# Patient Record
Sex: MALE | Race: WHITE | NOT HISPANIC OR LATINO | Employment: OTHER | URBAN - METROPOLITAN AREA
[De-identification: names, ages, dates, MRNs, and addresses within clinical notes are randomized per-mention and may not be internally consistent; named-entity substitution may affect disease eponyms.]

---

## 2017-09-13 ENCOUNTER — HOSPITAL ENCOUNTER (OUTPATIENT)
Dept: SLEEP MEDICINE | Facility: HOSPITAL | Age: 67
Discharge: HOME | End: 2017-09-13
Attending: INTERNAL MEDICINE | Admitting: INTERNAL MEDICINE

## 2018-05-31 ENCOUNTER — CONVERSION ENCOUNTER (OUTPATIENT)
Dept: ORTHOPEDIC SURGERY | Facility: CLINIC | Age: 68
End: 2018-05-31

## 2018-09-19 ENCOUNTER — HOSPITAL ENCOUNTER (OUTPATIENT)
Dept: SLEEP MEDICINE | Facility: HOSPITAL | Age: 68
Discharge: HOME | End: 2018-09-19
Attending: INTERNAL MEDICINE | Admitting: INTERNAL MEDICINE

## 2019-06-04 VITALS
HEIGHT: 72 IN | HEART RATE: 62 BPM | SYSTOLIC BLOOD PRESSURE: 160 MMHG | WEIGHT: 243 LBS | BODY MASS INDEX: 32.91 KG/M2 | DIASTOLIC BLOOD PRESSURE: 71 MMHG

## 2019-06-19 ENCOUNTER — APPOINTMENT (OUTPATIENT)
Dept: SLEEP MEDICINE | Facility: HOSPITAL | Age: 69
End: 2019-06-19

## 2019-09-19 ENCOUNTER — OFFICE VISIT (OUTPATIENT)
Dept: SLEEP MEDICINE | Facility: HOSPITAL | Age: 69
End: 2019-09-19

## 2019-09-19 VITALS
WEIGHT: 220.8 LBS | DIASTOLIC BLOOD PRESSURE: 78 MMHG | SYSTOLIC BLOOD PRESSURE: 165 MMHG | OXYGEN SATURATION: 97 % | HEART RATE: 61 BPM | BODY MASS INDEX: 29.91 KG/M2 | HEIGHT: 72 IN

## 2019-09-19 DIAGNOSIS — G47.33 OBSTRUCTIVE SLEEP APNEA, ADULT: Primary | ICD-10-CM

## 2019-09-19 PROCEDURE — G0463 HOSPITAL OUTPT CLINIC VISIT: HCPCS

## 2019-09-19 RX ORDER — QUINIDINE GLUCONATE 324 MG
1 TABLET, EXTENDED RELEASE ORAL
COMMUNITY
End: 2019-09-19

## 2019-09-19 RX ORDER — MELOXICAM 15 MG/1
TABLET ORAL EVERY 24 HOURS
COMMUNITY
Start: 2018-05-31

## 2019-09-19 RX ORDER — AMLODIPINE BESYLATE AND BENAZEPRIL HYDROCHLORIDE 5; 20 MG/1; MG/1
1 CAPSULE ORAL DAILY
COMMUNITY
Start: 2013-12-17 | End: 2022-04-21

## 2019-09-19 RX ORDER — MULTIVITAMIN
1 CAPSULE ORAL DAILY
COMMUNITY
Start: 2018-05-31

## 2019-09-19 RX ORDER — NEBIVOLOL 5 MG/1
5 TABLET ORAL DAILY
COMMUNITY
Start: 2013-02-22 | End: 2021-08-02

## 2019-09-19 RX ORDER — ATORVASTATIN CALCIUM 40 MG/1
40 TABLET, FILM COATED ORAL DAILY
COMMUNITY
Start: 2013-02-22 | End: 2022-04-21 | Stop reason: SDUPTHER

## 2019-09-19 RX ORDER — SOY PROTEIN
1 POWDER (GRAM) ORAL DAILY
COMMUNITY
Start: 2018-05-31

## 2019-09-19 RX ORDER — PANTOPRAZOLE SODIUM 40 MG/1
TABLET, DELAYED RELEASE ORAL EVERY 24 HOURS
COMMUNITY
Start: 2018-05-31

## 2019-09-19 RX ORDER — SACCHAROMYCES BOULARDII 250 MG
250 CAPSULE ORAL DAILY
COMMUNITY
Start: 2018-05-31

## 2019-09-19 RX ORDER — ASCORBIC ACID 500 MG
1000 TABLET ORAL EVERY 12 HOURS
COMMUNITY
Start: 2018-05-31

## 2019-09-19 NOTE — PROGRESS NOTES
SLEEP MEDICINE CONSULT      Patient Identification:     Name: Tom Steele   Age: 68 y.o.   Gender: male   : 1950   MRN: [id number]     Date of consult: 2019    PCP/Referring Physician(s):     PCP: Sunil Daugherty MD  Referring Provider: Sunil Daugherty MD      Chief Complaint:   Obstructive sleep apnea.  Yearly follow-up for compliance.    History of Present Illness:   This is a very pleasant 68 years old  gentleman who is here today for yearly follow-up for compliance after using his mask with given pressures for any year or so.  He is accompanied by his wife.    He has a full facemask which is sanjay view.  He finds the mask comfortable though he wants to look at other varieties a full facemask.  No air leaks noted by him.  The pressures are quite tolerable to him.  Uses humidifier on regular basis.    His usual bedtime nowadays is 11 PM takes him symmetrically an hour or so to doze off.  If he takes some time to relax at night.  He thinks about his day.  Usually try to plan his next day.  He wakes up at 8 in the morning to start his day.  He has often woken up between 4 AM to 4:30 AM for a bathroom break.  He denies any symptoms of headache or dry mouth at night on the morning he does not snore with the mask in place.  No symptoms of heartburn at night or in the morning.  He describes himself as a restless sleeper toss and turn at night.  His neck and back hurts.  He has leg cramps at night.    He has woken up in the morning a little somnolent.  He usually wakes up after 2 cups of coffee.  He likes to d to take 2 cans of Diet Coke throughout the day.  He takes a nap after breakfast and in the afternoon.  Does not use his mask.  Usually if feels refreshed after the nap.  [place] were sleepiness score is 5/24.    Allergies, Medications, and Past History:   Allergies:    No Known Allergies  Current Medications:    Prior to Admission medications    Medication Sig Start Date End Date  Taking? Authorizing Provider   amLODIPine-benazepril (LOTREL 5-20) 5-20 MG per capsule AMLODIPINE BESY-BENAZEPRIL HCL 5-20 MG CAPS 12/17/13  Yes Provider, Historical, MD   atorvastatin (LIPITOR) 40 MG tablet Take 40 mg by mouth Daily. 2/22/13  Yes Provider, Historical, MD   Cetirizine HCl (ZYRTEC ALLERGY) 10 MG tablet dispersible Daily. 5/31/18  Yes Provider, MD Kita   Cholecalciferol (EQL VITAMIN D3) 2000 units capsule EQL VITAMIN D3 2000 UNIT CAPS 5/31/18  Yes Provider, Historical, MD   Chromium-Cinnamon (CINNAMON PLUS CHROMIUM)  MCG-MG capsule CINNAMON PLUS CHROMIUM  MCG-MG CAPS 5/31/18  Yes Provider, Historical, MD   Cobalamine Combinations (VITAMIN B12-FOLIC ACID) 500-400 MCG tablet VITAMIN B12-FOLIC ACID 500-400 MCG TABS 5/31/18  Yes Provider, MD Kita   Coenzyme Q10 (COQ-10) 200 MG capsule COQ-10 200 MG ORAL CAPSULE 5/31/18  Yes Provider, Historical, MD   Glucosamine HCl 1000 MG tablet GLUCOSAMINE HCL TABS 5/31/18  Yes Provider, Historical, MD   meloxicam (MOBIC) 15 MG tablet Daily. 5/31/18  Yes Provider, Historical, MD   Multiple Vitamin (MULTIVITAMIN) capsule MULTIVITAMINS CAPS 5/31/18  Yes Provider, Historical, MD   nebivolol (BYSTOLIC) 5 MG tablet Take 5 mg by mouth Daily. 2/22/13  Yes Provider, Historical, MD   pantoprazole (PROTONIX) 40 MG EC tablet Daily. 5/31/18  Yes Provider, Historical, MD   saccharomyces boulardii (FLORASTOR) 250 MG capsule PROBIOTIC CAPS 5/31/18  Yes Provider, Historical, MD   vitamin C (ASCORBIC ACID) 500 MG tablet Every 12 (Twelve) Hours. 5/31/18  Yes Provider, Historical, MD   aspirin 81 MG tablet Take 81 mg by mouth Daily.    Provider, Historical, MD   CINNAMON PO Take  by mouth.    Provider, Historical, MD   Glucosamine-Chondroit-Vit C-Mn (GLUCOSAMINE-CHONDROITIN) tablet Take 1 tablet by mouth.  9/19/19  Provider, MD Kita     Past Medical History:    Past Medical History:   Diagnosis Date   • Allergic rhinitis    • Degenerative joint disease   "  • FH: cholecystectomy    • Hypercholesteremia    • Hypertension    • NEREIDA (obstructive sleep apnea)       Past Surgical History:    History reviewed. No pertinent surgical history.   Social History:    Social History     Tobacco Use   • Smoking status: Never Smoker   • Smokeless tobacco: Never Used   Substance Use Topics   • Alcohol use: Defer   • Drug use: Not on file     Family Medical History:  History reviewed. No pertinent family history.      Review of Systems:   Constitutional:  Denies fever or chills and weight gain  Eyes:  Denies change in visual acuity   HENT: He has nasal congestion, sore throat or post nasal drainage which she attributes to allergies.  He becomes more symptomatic during spring and fall.  He was tested for allergies in the past took allergy shots for 2 years.  He found it very ineffective and gave it up.  He uses [place] pot on regular basis.  Respiratory:  Denies cough, shortness of breath or dyspnea on exertion    Cardiovascular:  Denies chest pain or edema   GI:  Denies abdominal pain, nausea, vomiting, bloody stools or diarrhea .  No aerophagia.  :  Denies dysuria or nocturia   Musculoskeletal: He suffers from back pain as well as neck pain.  He has severe degenerative joint disease involving cervical as well as lumbosacral spine.  He underwent several surgical procedures.  He has remained symptomatic.    Integument:  Denies rash   Neurologic:  Denies headache, focal weakness or sensory changes. No history of stroke or seizures.   Endocrine:  Denies polyuria or polydipsia   Lymphatic:  Denies swollen glands   Psychiatric:  Denies depression, anxiety or claustrophobia      Physical Exam:     Vitals:    09/19/19 1135   BP: 165/78   Pulse: 61   SpO2: 97%   Weight: 100 kg (220 lb 12.8 oz)   Height: 182.9 cm (72\")     HEENT: Head is normocephalic, atraumatic, normal distribution of hair. Pupils reactive to light. Ocular movements intact. No nasal congestion on sniff test. No deviated " nasal septum. No micrognathia.  Throat: Mallapatti stage 4, tongue midline, mucosa moist.  Neck: no JVD, thyromegaly, mass, +midline trachea, No lymphadenopathy.  Lungs: clear, no wheeze, rhonchi, crackles  Cardiovascular: S1, S2, RRR no murmurs, rubs or gallops  Abd: +BS's soft NT/ND, no CVA tenderness.    Ext: no edema, cyanosis, clubbing, pulses intact  Neuro: Awake alert, oriented to time place and person. Grossly intact to motor, sensory cerebral, and cerebellar (without focal deficit)  Psych: No overt lor, depression, psychosis or inappropriate behavior  Skin: No rash, cellulitis, or ulcerations.  No facial rash or erosions    DIAGNOSTIC DATA  Memory card download shows data from 9/19/2018 to 9/18/2019 overall 365 days of data reviewed.  99.7% use noted.  Maximum hours used 11 hours 44 minutes.  Minimum time use 5 hours 33 minutes.  99.7% of the time the mask has been used for more than 4 hours.  The average apnea hypopnea index is 3.0 events per hour on an auto CPAP mode of therapy with a pressure range between 14-20 CWP.  Assessment/Plan:   Obstructive sleep apnea:  This is a very pleasant gentleman who has been diagnosed with obstructive sleep apnea.  He has remained compliant with therapy.  He is getting full benefit from it.    The results of the memory card download were discussed in detail with the patient all questions were answered.  Recommendation.  To continue using his mask with given pressures on a nightly basis.  He is advised to continue using the mask with given pressures for more than 4 hours for maximum benefit.  Or as long as he sleeps.  He is advised to use the mask with given pressure if he takes a nap during the daytime.  Insomnia:  He has difficult time falling asleep at night.  Sometimes takes him an hour or so to doze off.  His insomnia may be due to his chronic pain syndrome.  His neck and back hurts at bedtime.  He finds it difficult to relax at night.  Underlying  anxiety/depression may  Be aggravating his nocturnal insomnia.  Commendation.  As of therapy for underlying factors is recommended in order to control the symptoms of insomnia.  Driving instructions. Patient is advised to avoid driving if tired or somnolent. To avoid all alcoholic beverages or medications causing somnolence.         Diagnosis Plan   1. Obstructive sleep apnea, adult  CPAP Therapy     No orders of the defined types were placed in this encounter.    Orders Placed This Encounter   Procedures   • CPAP Therapy     Please change FFM to dreamware full face mask . Size to fit.     Order Specific Question:   PAP Tubing (1 per 3 months)     Answer:    6' Standard tubing     Order Specific Question:   PAP Mask (1 per 3 months)     Answer:    Full face mask     Order Specific Question:   Mask interface (1 per month)     Answer:    Face Mask Interface     Order Specific Question:   PAP Accessories     Answer:    Water Chamber for PAP Humidifier (1 per 6 month) &  Filter PAP Disposable (2 per month) &  Filter PAP Non-Disposable (1 per 6 months) &  Chinstrap to be used with PAP (1 per 6 months) &  Headgear to be used with PAP (1 per 6 mo)     Order Specific Question:   Does the patient have Obstructive Sleep Apnea or other qualifying diagnosis for PAP ordered?     Answer:   Yes     Order Specific Question:   Does the patient require humidification?     Answer:   Yes     Order Specific Question:   Humidifier     Answer:    Humidifier Heated for CPAP or BiPAP     Order Specific Question:   The patient requires a PAP Device & continued use of Supplies to administer effective PAP therapy at the frequency of use ordered above     Answer:   Yes     Order Specific Question:   Initial Supplies and refills authorized for 12 months?     Answer:   Yes     Order Specific Question:   The face to face evaluation was performed on     Answer:   9/19/2019     Order Specific Question:    Which DME company is this being sent to?     Answer:   [facility] [[id number]]     Order Specific Question:   Length of Need (99 Months = Lifetime)     Answer:   99 Months = Lifetime         Janey Craven MD  9/19/2019  12:41 PM

## 2020-07-30 ENCOUNTER — TRANSCRIBE ORDERS (OUTPATIENT)
Dept: ADMINISTRATIVE | Facility: HOSPITAL | Age: 70
End: 2020-07-30

## 2020-07-30 DIAGNOSIS — I10 HYPERTENSION, UNSPECIFIED TYPE: Primary | ICD-10-CM

## 2020-07-30 DIAGNOSIS — I87.1: ICD-10-CM

## 2020-08-05 ENCOUNTER — HOSPITAL ENCOUNTER (OUTPATIENT)
Dept: CARDIOLOGY | Facility: HOSPITAL | Age: 70
Discharge: HOME | End: 2020-08-05
Admitting: FAMILY MEDICINE

## 2020-08-05 DIAGNOSIS — I87.1: ICD-10-CM

## 2020-08-05 DIAGNOSIS — I10 HYPERTENSION, UNSPECIFIED TYPE: ICD-10-CM

## 2020-08-05 LAB
BH CV ECHO MEAS - DIST REN A PSV LEFT: 80 CM/S
BH CV ECHO MEAS - MID REN A PSV LEFT: 127 CM/S
BH CV ECHO MEAS - PROX REN A PSV LEFT: 166 CM/S
BH CV VAS RENAL AORTIC MID PSV: 105 CM/S
BH CV XLRA MEAS - KID L LEFT: 12.1 CM
BH CV XLRA MEAS DIST REN A PSV RIGHT: 91 CM/S
BH CV XLRA MEAS KID L RIGHT: 11.1 CM
BH CV XLRA MEAS MID REN A PSV RIGHT: 78 CM/S
BH CV XLRA MEAS PROX REN A PSV RIGHT: 129 CM/S
BH CV XLRA MEAS RAR LEFT: 1.6
BH CV XLRA MEAS RAR RIGHT: 1.2

## 2020-08-05 PROCEDURE — 93975 VASCULAR STUDY: CPT

## 2020-09-24 ENCOUNTER — OFFICE VISIT (OUTPATIENT)
Dept: SLEEP MEDICINE | Facility: HOSPITAL | Age: 70
End: 2020-09-24

## 2020-09-24 VITALS
HEIGHT: 71 IN | HEART RATE: 61 BPM | OXYGEN SATURATION: 97 % | SYSTOLIC BLOOD PRESSURE: 152 MMHG | WEIGHT: 220 LBS | BODY MASS INDEX: 30.8 KG/M2 | DIASTOLIC BLOOD PRESSURE: 63 MMHG

## 2020-09-24 DIAGNOSIS — G47.33 OBSTRUCTIVE SLEEP APNEA, ADULT: Primary | ICD-10-CM

## 2020-09-24 PROCEDURE — G0463 HOSPITAL OUTPT CLINIC VISIT: HCPCS

## 2020-09-24 NOTE — PROGRESS NOTES
SLEEP MEDICINE CONSULT      Patient Identification:     Name: Tom Steele   Age: 69 y.o.   Gender: male   : 1950   MRN: [id number]     Date of consult: 2020    PCP/Referring Physician(s):     PCP: Sunil Daugherty MD  Referring Provider: Janey Craven MD      Chief Complaint:   Obstructive sleep apnea.  Yearly follow-up for compliance.    History of Present Illness:   This is a very pleasant 69 years old  gentleman who is here today for yearly follow-up for compliance after using his mask with given pressures for any year or so.  He is accompanied by his wife.    He has a full facemask. He has changed his mask to dreamware  Wide variety. He finds the mask comfortable though he  Has to adjust the mask frequently at night.  The pressures are quite tolerable to him.  Uses humidifier on regular basis.    His usual bedtime nowadays is 11 PM to 1130 PM. He usually dozes off between 1 to 2 hours .  he takes some time to relax at night.  He thinks about his day.  Usually try to plan his next day.  His mind continues to work at night. He wakes up at 8 in the morning to start his day.  He has often woken up between 4 AM to 4:30 AM for a bathroom break.  He denies any symptoms of headache or dry mouth at night on the morning he does not snore with the mask in place.  No symptoms of heartburn at night or in the morning.  He describes himself as a restless sleeper toss and turn at night.  His neck and back hurts.  He has leg cramps at night.    He has woken up in the morning a little tired..  He likes to have  after 2 cups of coffee in te morning and  2 cans of Diet Coke throughout the day.  He takes a nap  in the afternoon.  Does not use his mask.  Usually  feels refreshed after the nap.  [place] were sleepiness score is 8/24.    Allergies, Medications, and Past History:   Allergies:    No Known Allergies  Current Medications:    Prior to Admission medications    Medication Sig Start Date End Date  Taking? Authorizing Provider   amLODIPine-benazepril (LOTREL 5-20) 5-20 MG per capsule AMLODIPINE BESY-BENAZEPRIL HCL 5-20 MG CAPS 12/17/13  Yes Provider, Historical, MD   atorvastatin (LIPITOR) 40 MG tablet Take 40 mg by mouth Daily. 2/22/13  Yes Provider, Historical, MD   Cetirizine HCl (ZYRTEC ALLERGY) 10 MG tablet dispersible Daily. 5/31/18  Yes Provider, MD Kita   Cholecalciferol (EQL VITAMIN D3) 2000 units capsule EQL VITAMIN D3 2000 UNIT CAPS 5/31/18  Yes Provider, Historical, MD   Chromium-Cinnamon (CINNAMON PLUS CHROMIUM)  MCG-MG capsule CINNAMON PLUS CHROMIUM  MCG-MG CAPS 5/31/18  Yes Provider, Historical, MD   Cobalamine Combinations (VITAMIN B12-FOLIC ACID) 500-400 MCG tablet VITAMIN B12-FOLIC ACID 500-400 MCG TABS 5/31/18  Yes Provider, MD Kita   Coenzyme Q10 (COQ-10) 200 MG capsule COQ-10 200 MG ORAL CAPSULE 5/31/18  Yes Provider, Historical, MD   Glucosamine HCl 1000 MG tablet GLUCOSAMINE HCL TABS 5/31/18  Yes Provider, Historical, MD   meloxicam (MOBIC) 15 MG tablet Daily. 5/31/18  Yes Provider, Historical, MD   Multiple Vitamin (MULTIVITAMIN) capsule MULTIVITAMINS CAPS 5/31/18  Yes Provider, Historical, MD   nebivolol (BYSTOLIC) 5 MG tablet Take 5 mg by mouth Daily. 2/22/13  Yes Provider, Historical, MD   pantoprazole (PROTONIX) 40 MG EC tablet Daily. 5/31/18  Yes Provider, Historical, MD   saccharomyces boulardii (FLORASTOR) 250 MG capsule PROBIOTIC CAPS 5/31/18  Yes Provider, Historical, MD   vitamin C (ASCORBIC ACID) 500 MG tablet Every 12 (Twelve) Hours. 5/31/18  Yes Provider, Historical, MD   aspirin 81 MG tablet Take 81 mg by mouth Daily.    Provider, Historical, MD   CINNAMON PO Take  by mouth.    Provider, Historical, MD   Glucosamine-Chondroit-Vit C-Mn (GLUCOSAMINE-CHONDROITIN) tablet Take 1 tablet by mouth.  9/19/19  Provider, MD Kita     Past Medical History:    Past Medical History:   Diagnosis Date   • Allergic rhinitis    • Degenerative joint disease   "  • FH: cholecystectomy    • Hypercholesteremia    • Hypertension    • NEREIDA (obstructive sleep apnea)       Past Surgical History:    No past surgical history on file.   Social History:    Social History     Tobacco Use   • Smoking status: Never Smoker   • Smokeless tobacco: Never Used   Substance Use Topics   • Alcohol use: Defer   • Drug use: Not on file     Family Medical History:  History reviewed. No pertinent family history.      Review of Systems:   Constitutional:  Denies fever or chills . He has gained 20 lbs in one year.  Eyes:  Denies change in visual acuity   HENT: He has nasal congestion, sore throat or post nasal drainage which she attributes to allergies.  He becomes more symptomatic during spring and fall.  He was tested for allergies in the past took allergy shots for 2 years.  He found it very ineffective and gave it up.  He uses [place] pot on regular basis.  Respiratory:  Denies cough, shortness of breath or dyspnea on exertion    Cardiovascular:  Denies chest pain or edema   GI:  Denies abdominal pain, nausea, vomiting, bloody stools or diarrhea .  No aerophagia.  :  Denies dysuria or nocturia   Musculoskeletal: He suffers from back pain as well as neck pain.  He has severe degenerative joint disease involving cervical as well as lumbosacral spine.  He underwent several surgical procedures.  He has remained symptomatic.  He takes narcotics sparingly for pain control.  Integument:  Denies rash   Neurologic:  Denies headache, focal weakness or sensory changes. No history of stroke or seizures.   Endocrine:  Denies polyuria or polydipsia   Lymphatic:  Denies swollen glands   Psychiatric:  Denies depression, anxiety or claustrophobia      Physical Exam:     Vitals:    09/24/20 1141   BP: 152/63   BP Location: Right arm   Patient Position: Sitting   Cuff Size: Adult   Pulse: 61   SpO2: 97%   Weight: 99.8 kg (220 lb)   Height: 180.3 cm (71\")     HEENT: Head is normocephalic, atraumatic, normal " distribution of hair. Pupils reactive to light. Ocular movements intact. No nasal congestion on sniff test. No deviated nasal septum. No micrognathia.  Throat: Mallapatti stage 4, tongue midline, mucosa moist.  Neck: no JVD, thyromegaly, mass, +midline trachea, No lymphadenopathy.  Lungs: clear, no wheeze, rhonchi, crackles  Cardiovascular: S1, S2, RRR no murmurs, rubs or gallops  Abd: +BS's soft NT/ND, no CVA tenderness.    Ext: no edema, cyanosis, clubbing, pulses intact  Neuro: Awake alert, oriented to time place and person. Grossly intact to motor, sensory cerebral, and cerebellar (without focal deficit)  Psych: No overt lor, depression, psychosis or inappropriate behavior  Skin: No rash, cellulitis, or ulcerations.  No facial rash or erosions    DIAGNOSTIC DATA  Memory card download shows data from 9/25/2019 to 9/23/2020 overall 365 days of data reviewed.  99.5% of  use noted.  Maximum hours used 11 hours 33 minutes.  Minimum time use 5 hours 38 minutes.  99.5% of the time the mask has been used for more than 4 hours.  The average apnea hypopnea index is 3.4 events per hour on an auto CPAP mode of therapy with a pressure range between 14-20 CWP.  Assessment/Plan:   Obstructive sleep apnea:  This is a very pleasant gentleman who has been diagnosed with obstructive sleep apnea.  He has remained compliant with therapy.  He is getting full benefit from it.    The results of the memory card download were discussed in detail with the patient all questions were answered.  Recommendation.  To continue using his mask with given pressures on a nightly basis.  He is advised to continue using the mask with given pressures for more than 4 hours for maximum benefit.  Or as long as he sleeps.  He is advised to use the mask with given pressure if he takes a nap during the daytime.  Insomnia:  He has difficult time falling asleep at night.  Sometimes takes him an hour or so to doze off.  His insomnia may be due to his chronic  pain syndrome.  His neck and back hurts at bedtime.  He finds it difficult to relax at night.  Underlying anxiety/depression may may be aggravating his insomnia.  Recommendation.  Aggressive therapy for underlying anxiety/depression is recommended.  He may try over-the-counter melatonin.  Allergic rhinitis;  He remains symptomatic with change of seasons.  Recommendation.  Aggressive therapy is recommended.  Driving instructions. Patient is advised to avoid driving if tired or somnolent. To avoid all alcoholic beverages or medications causing somnolence.         Diagnosis Plan   1. Obstructive sleep apnea, adult  CPAP Therapy     No orders of the defined types were placed in this encounter.    Orders Placed This Encounter   Procedures   • CPAP Therapy     Order Specific Question:   Equipment:     Answer:   PAP Supplies Only     Order Specific Question:   PAP Tubing (1 per 3 months)     Answer:    6' Standard tubing     Order Specific Question:   PAP Mask (1 per 3 months)     Answer:    Full face mask     Order Specific Question:   Mask interface (1 per month)     Answer:    Face Mask Interface     Order Specific Question:   PAP Accessories     Answer:    Water Chamber for PAP Humidifier (1 per 6 month) &  Filter PAP Disposable (2 per month) &  Filter PAP Non-Disposable (1 per 6 months) &  Chinstrap to be used with PAP (1 per 6 months) &  Headgear to be used with PAP (1 per 6 mo)     Order Specific Question:   Does the patient have Obstructive Sleep Apnea or other qualifying diagnosis for PAP ordered?     Answer:   Yes     Order Specific Question:   Does the patient require humidification?     Answer:   Yes     Order Specific Question:   Humidifier     Answer:    Humidifier Heated for CPAP or BiPAP     Order Specific Question:   If ordering a BiPAP for NEREIDA a CPAP has been tried and/or ruled out?     Answer:   Does not apply     Order Specific Question:   The patient  requires a PAP Device & continued use of Supplies to administer effective PAP therapy at the frequency of use ordered above     Answer:   Yes     Order Specific Question:   Initial Supplies and refills authorized for 12 months?     Answer:   Yes     Order Specific Question:   Which 3nder company is this being sent to?     Answer:   [facility] [[id number]]     Order Specific Question:   Length of Need (99 Months = Lifetime)     Answer:   99 Months = Lifetime         Janey Craven MD  9/24/2020  12:42 EDT

## 2020-11-05 ENCOUNTER — OFFICE (AMBULATORY)
Dept: URBAN - METROPOLITAN AREA PATHOLOGY 4 | Facility: PATHOLOGY | Age: 70
End: 2020-11-05
Payer: COMMERCIAL

## 2020-11-05 ENCOUNTER — ON CAMPUS - OUTPATIENT (AMBULATORY)
Dept: URBAN - METROPOLITAN AREA HOSPITAL 2 | Facility: HOSPITAL | Age: 70
End: 2020-11-05

## 2020-11-05 VITALS
RESPIRATION RATE: 16 BRPM | HEART RATE: 78 BPM | SYSTOLIC BLOOD PRESSURE: 158 MMHG | HEART RATE: 73 BPM | SYSTOLIC BLOOD PRESSURE: 153 MMHG | HEART RATE: 76 BPM | DIASTOLIC BLOOD PRESSURE: 79 MMHG | HEART RATE: 83 BPM | DIASTOLIC BLOOD PRESSURE: 83 MMHG | HEART RATE: 80 BPM | SYSTOLIC BLOOD PRESSURE: 142 MMHG | SYSTOLIC BLOOD PRESSURE: 155 MMHG | SYSTOLIC BLOOD PRESSURE: 162 MMHG | OXYGEN SATURATION: 97 % | DIASTOLIC BLOOD PRESSURE: 86 MMHG | OXYGEN SATURATION: 98 % | HEART RATE: 72 BPM | SYSTOLIC BLOOD PRESSURE: 167 MMHG | OXYGEN SATURATION: 96 % | DIASTOLIC BLOOD PRESSURE: 90 MMHG | DIASTOLIC BLOOD PRESSURE: 82 MMHG | SYSTOLIC BLOOD PRESSURE: 157 MMHG | OXYGEN SATURATION: 99 % | DIASTOLIC BLOOD PRESSURE: 75 MMHG | HEART RATE: 84 BPM | SYSTOLIC BLOOD PRESSURE: 145 MMHG | DIASTOLIC BLOOD PRESSURE: 87 MMHG | TEMPERATURE: 98 F | HEART RATE: 90 BPM | HEIGHT: 72 IN | SYSTOLIC BLOOD PRESSURE: 136 MMHG | RESPIRATION RATE: 18 BRPM | WEIGHT: 233 LBS | DIASTOLIC BLOOD PRESSURE: 80 MMHG

## 2020-11-05 DIAGNOSIS — D12.3 BENIGN NEOPLASM OF TRANSVERSE COLON: ICD-10-CM

## 2020-11-05 DIAGNOSIS — D12.8 BENIGN NEOPLASM OF RECTUM: ICD-10-CM

## 2020-11-05 DIAGNOSIS — D12.2 BENIGN NEOPLASM OF ASCENDING COLON: ICD-10-CM

## 2020-11-05 DIAGNOSIS — K63.5 POLYP OF COLON: ICD-10-CM

## 2020-11-05 DIAGNOSIS — D12.5 BENIGN NEOPLASM OF SIGMOID COLON: ICD-10-CM

## 2020-11-05 DIAGNOSIS — Z86.010 PERSONAL HISTORY OF COLONIC POLYPS: ICD-10-CM

## 2020-11-05 PROBLEM — K62.1 RECTAL POLYP: Status: ACTIVE | Noted: 2020-11-05

## 2020-11-05 LAB
GI HISTOLOGY: A. UNSPECIFIED: (no result)
GI HISTOLOGY: B. UNSPECIFIED: (no result)
GI HISTOLOGY: C. UNSPECIFIED: (no result)
GI HISTOLOGY: D. UNSPECIFIED: (no result)
GI HISTOLOGY: E. UNSPECIFIED: (no result)
GI HISTOLOGY: PDF REPORT: (no result)

## 2020-11-05 PROCEDURE — 45385 COLONOSCOPY W/LESION REMOVAL: CPT | Mod: PT | Performed by: INTERNAL MEDICINE

## 2020-11-05 PROCEDURE — 88305 TISSUE EXAM BY PATHOLOGIST: CPT | Mod: 26 | Performed by: INTERNAL MEDICINE

## 2021-08-02 ENCOUNTER — HOSPITAL ENCOUNTER (OUTPATIENT)
Facility: HOSPITAL | Age: 71
Setting detail: OBSERVATION
Discharge: HOME | End: 2021-08-04
Attending: INTERNAL MEDICINE | Admitting: FAMILY MEDICINE

## 2021-08-02 ENCOUNTER — APPOINTMENT (OUTPATIENT)
Dept: GENERAL RADIOLOGY | Facility: HOSPITAL | Age: 71
End: 2021-08-02

## 2021-08-02 DIAGNOSIS — R53.1 WEAKNESS: ICD-10-CM

## 2021-08-02 DIAGNOSIS — R73.9 HYPERGLYCEMIA: Primary | ICD-10-CM

## 2021-08-02 DIAGNOSIS — E87.1 HYPONATREMIA: ICD-10-CM

## 2021-08-02 DIAGNOSIS — R07.9 CHEST PAIN, UNSPECIFIED TYPE: ICD-10-CM

## 2021-08-02 LAB
ACETONE BLD QL: ABNORMAL
ALBUMIN SERPL-MCNC: 4 G/DL (ref 3.5–5.2)
ALBUMIN/GLOB SERPL: 1.4 G/DL
ALP SERPL-CCNC: 117 U/L (ref 39–117)
ALT SERPL W P-5'-P-CCNC: 46 U/L (ref 1–41)
ANION GAP SERPL CALCULATED.3IONS-SCNC: 22 MMOL/L (ref 5–15)
AST SERPL-CCNC: 33 U/L (ref 1–40)
ATMOSPHERIC PRESS: ABNORMAL MM[HG]
BASE EXCESS BLDV CALC-SCNC: -6.2 MMOL/L (ref -2–2)
BASOPHILS # BLD AUTO: 0.1 10*3/MM3 (ref 0–0.2)
BASOPHILS NFR BLD AUTO: 1 % (ref 0–1.5)
BDY SITE: ABNORMAL
BILIRUB SERPL-MCNC: 0.6 MG/DL (ref 0–1.2)
BILIRUB UR QL STRIP: ABNORMAL
BUN SERPL-MCNC: 18 MG/DL (ref 8–23)
BUN/CREAT SERPL: 16.2 (ref 7–25)
CALCIUM SPEC-SCNC: 9.3 MG/DL (ref 8.6–10.5)
CHLORIDE SERPL-SCNC: 88 MMOL/L (ref 98–107)
CK SERPL-CCNC: 55 U/L (ref 20–200)
CLARITY UR: CLEAR
CO2 BLDA-SCNC: 20 MMOL/L (ref 22–29)
CO2 SERPL-SCNC: 18 MMOL/L (ref 22–29)
COLOR UR: YELLOW
CREAT SERPL-MCNC: 1.11 MG/DL (ref 0.76–1.27)
DEPRECATED RDW RBC AUTO: 42.4 FL (ref 37–54)
EOSINOPHIL # BLD AUTO: 0.1 10*3/MM3 (ref 0–0.4)
EOSINOPHIL NFR BLD AUTO: 1.8 % (ref 0.3–6.2)
ERYTHROCYTE [DISTWIDTH] IN BLOOD BY AUTOMATED COUNT: 13.4 % (ref 12.3–15.4)
GFR SERPL CREATININE-BSD FRML MDRD: 65 ML/MIN/1.73
GLOBULIN UR ELPH-MCNC: 2.8 GM/DL
GLUCOSE BLDC GLUCOMTR-MCNC: 284 MG/DL (ref 70–105)
GLUCOSE BLDC GLUCOMTR-MCNC: 301 MG/DL (ref 70–105)
GLUCOSE BLDC GLUCOMTR-MCNC: 345 MG/DL (ref 70–105)
GLUCOSE BLDC GLUCOMTR-MCNC: 443 MG/DL (ref 74–100)
GLUCOSE BLDC GLUCOMTR-MCNC: 458 MG/DL (ref 70–105)
GLUCOSE BLDC GLUCOMTR-MCNC: 471 MG/DL (ref 70–105)
GLUCOSE SERPL-MCNC: 463 MG/DL (ref 65–99)
GLUCOSE UR STRIP-MCNC: ABNORMAL MG/DL
HBA1C MFR BLD: 13 % (ref 3.5–5.6)
HCO3 BLDV-SCNC: 18.9 MMOL/L (ref 22–26)
HCT VFR BLD AUTO: 41.9 % (ref 37.5–51)
HGB BLD-MCNC: 14.6 G/DL (ref 13–17.7)
HGB UR QL STRIP.AUTO: NEGATIVE
HOLD SPECIMEN: NORMAL
HOLD SPECIMEN: NORMAL
INHALED O2 CONCENTRATION: 21 %
INR PPP: 1 (ref 0.93–1.1)
KETONES UR QL STRIP: ABNORMAL
LEUKOCYTE ESTERASE UR QL STRIP.AUTO: NEGATIVE
LIPASE SERPL-CCNC: 76 U/L (ref 13–60)
LYMPHOCYTES # BLD AUTO: 1.9 10*3/MM3 (ref 0.7–3.1)
LYMPHOCYTES NFR BLD AUTO: 23.3 % (ref 19.6–45.3)
MAGNESIUM SERPL-MCNC: 2 MG/DL (ref 1.6–2.4)
MCH RBC QN AUTO: 31.3 PG (ref 26.6–33)
MCHC RBC AUTO-ENTMCNC: 34.8 G/DL (ref 31.5–35.7)
MCV RBC AUTO: 90 FL (ref 79–97)
MODALITY: ABNORMAL
MONOCYTES # BLD AUTO: 0.9 10*3/MM3 (ref 0.1–0.9)
MONOCYTES NFR BLD AUTO: 11.2 % (ref 5–12)
NEUTROPHILS NFR BLD AUTO: 5 10*3/MM3 (ref 1.7–7)
NEUTROPHILS NFR BLD AUTO: 62.7 % (ref 42.7–76)
NITRITE UR QL STRIP: NEGATIVE
NRBC BLD AUTO-RTO: 0.1 /100 WBC (ref 0–0.2)
NT-PROBNP SERPL-MCNC: 76.1 PG/ML (ref 0–900)
OSMOLALITY SERPL: 306 MOSM/KG (ref 280–301)
PCO2 BLDV: 35.4 MM HG (ref 42–51)
PH BLDV: 7.33 PH UNITS (ref 7.32–7.43)
PH UR STRIP.AUTO: <=5 [PH] (ref 5–8)
PHOSPHATE SERPL-MCNC: 4.6 MG/DL (ref 2.5–4.5)
PLATELET # BLD AUTO: 286 10*3/MM3 (ref 140–450)
PMV BLD AUTO: 8.6 FL (ref 6–12)
PO2 BLDV: 36.1 MM HG (ref 40–42)
POTASSIUM SERPL-SCNC: 4.5 MMOL/L (ref 3.5–5.2)
PROT SERPL-MCNC: 6.8 G/DL (ref 6–8.5)
PROT UR QL STRIP: NEGATIVE
PROTHROMBIN TIME: 11.1 SECONDS (ref 9.6–11.7)
RBC # BLD AUTO: 4.66 10*6/MM3 (ref 4.14–5.8)
SAO2 % BLDCOV: 65.9 % (ref 95–99)
SARS-COV-2 RNA PNL SPEC NAA+PROBE: NOT DETECTED
SODIUM SERPL-SCNC: 128 MMOL/L (ref 136–145)
SP GR UR STRIP: 1.05 (ref 1–1.03)
TROPONIN T SERPL-MCNC: <0.01 NG/ML (ref 0–0.03)
TROPONIN T SERPL-MCNC: <0.01 NG/ML (ref 0–0.03)
TSH SERPL DL<=0.05 MIU/L-ACNC: 1.85 UIU/ML (ref 0.27–4.2)
UROBILINOGEN UR QL STRIP: ABNORMAL
WBC # BLD AUTO: 8 10*3/MM3 (ref 3.4–10.8)

## 2021-08-02 PROCEDURE — 87635 SARS-COV-2 COVID-19 AMP PRB: CPT | Performed by: INTERNAL MEDICINE

## 2021-08-02 PROCEDURE — 83036 HEMOGLOBIN GLYCOSYLATED A1C: CPT | Performed by: PHYSICIAN ASSISTANT

## 2021-08-02 PROCEDURE — 84443 ASSAY THYROID STIM HORMONE: CPT | Performed by: PHYSICIAN ASSISTANT

## 2021-08-02 PROCEDURE — 71045 X-RAY EXAM CHEST 1 VIEW: CPT

## 2021-08-02 PROCEDURE — G0378 HOSPITAL OBSERVATION PER HR: HCPCS

## 2021-08-02 PROCEDURE — 81003 URINALYSIS AUTO W/O SCOPE: CPT | Performed by: PHYSICIAN ASSISTANT

## 2021-08-02 PROCEDURE — 82962 GLUCOSE BLOOD TEST: CPT

## 2021-08-02 PROCEDURE — 85610 PROTHROMBIN TIME: CPT | Performed by: PHYSICIAN ASSISTANT

## 2021-08-02 PROCEDURE — 83880 ASSAY OF NATRIURETIC PEPTIDE: CPT | Performed by: PHYSICIAN ASSISTANT

## 2021-08-02 PROCEDURE — 85025 COMPLETE CBC W/AUTO DIFF WBC: CPT | Performed by: PHYSICIAN ASSISTANT

## 2021-08-02 PROCEDURE — 82009 KETONE BODYS QUAL: CPT | Performed by: PHYSICIAN ASSISTANT

## 2021-08-02 PROCEDURE — 82803 BLOOD GASES ANY COMBINATION: CPT

## 2021-08-02 PROCEDURE — 84100 ASSAY OF PHOSPHORUS: CPT | Performed by: PHYSICIAN ASSISTANT

## 2021-08-02 PROCEDURE — 83930 ASSAY OF BLOOD OSMOLALITY: CPT | Performed by: PHYSICIAN ASSISTANT

## 2021-08-02 PROCEDURE — C9803 HOPD COVID-19 SPEC COLLECT: HCPCS

## 2021-08-02 PROCEDURE — 83690 ASSAY OF LIPASE: CPT | Performed by: PHYSICIAN ASSISTANT

## 2021-08-02 PROCEDURE — 93005 ELECTROCARDIOGRAM TRACING: CPT | Performed by: PHYSICIAN ASSISTANT

## 2021-08-02 PROCEDURE — 80053 COMPREHEN METABOLIC PANEL: CPT | Performed by: PHYSICIAN ASSISTANT

## 2021-08-02 PROCEDURE — 99285 EMERGENCY DEPT VISIT HI MDM: CPT

## 2021-08-02 PROCEDURE — 63710000001 INSULIN REGULAR HUMAN PER 5 UNITS: Performed by: PHYSICIAN ASSISTANT

## 2021-08-02 PROCEDURE — 83735 ASSAY OF MAGNESIUM: CPT | Performed by: PHYSICIAN ASSISTANT

## 2021-08-02 PROCEDURE — 82550 ASSAY OF CK (CPK): CPT | Performed by: PHYSICIAN ASSISTANT

## 2021-08-02 PROCEDURE — 84484 ASSAY OF TROPONIN QUANT: CPT | Performed by: PHYSICIAN ASSISTANT

## 2021-08-02 RX ORDER — GLUCOSAMINE/D3/BOSWELLIA SERRA 1500MG-400
1 TABLET ORAL DAILY
COMMUNITY

## 2021-08-02 RX ORDER — HYDROCHLOROTHIAZIDE 25 MG/1
25 TABLET ORAL DAILY
COMMUNITY

## 2021-08-02 RX ORDER — ASPIRIN 81 MG/1
324 TABLET, CHEWABLE ORAL ONCE
Status: COMPLETED | OUTPATIENT
Start: 2021-08-02 | End: 2021-08-02

## 2021-08-02 RX ORDER — DEXTROSE MONOHYDRATE 25 G/50ML
25 INJECTION, SOLUTION INTRAVENOUS
Status: DISCONTINUED | OUTPATIENT
Start: 2021-08-02 | End: 2021-08-04 | Stop reason: HOSPADM

## 2021-08-02 RX ORDER — BENAZEPRIL HYDROCHLORIDE 20 MG/1
20 TABLET ORAL DAILY
COMMUNITY
End: 2022-04-21 | Stop reason: SDUPTHER

## 2021-08-02 RX ORDER — METOPROLOL SUCCINATE 50 MG/1
50 TABLET, EXTENDED RELEASE ORAL DAILY
COMMUNITY

## 2021-08-02 RX ORDER — SODIUM CHLORIDE 0.9 % (FLUSH) 0.9 %
10 SYRINGE (ML) INJECTION AS NEEDED
Status: DISCONTINUED | OUTPATIENT
Start: 2021-08-02 | End: 2021-08-04 | Stop reason: HOSPADM

## 2021-08-02 RX ORDER — TURMERIC ROOT EXTRACT 500 MG
1 TABLET ORAL DAILY
COMMUNITY

## 2021-08-02 RX ORDER — POTASSIUM CHLORIDE 750 MG/1
10 TABLET, FILM COATED, EXTENDED RELEASE ORAL DAILY
COMMUNITY

## 2021-08-02 RX ORDER — NICOTINE POLACRILEX 4 MG
15 LOZENGE BUCCAL
Status: DISCONTINUED | OUTPATIENT
Start: 2021-08-02 | End: 2021-08-04 | Stop reason: HOSPADM

## 2021-08-02 RX ORDER — INSULIN LISPRO 100 [IU]/ML
0-14 INJECTION, SOLUTION INTRAVENOUS; SUBCUTANEOUS
Status: DISCONTINUED | OUTPATIENT
Start: 2021-08-03 | End: 2021-08-04 | Stop reason: HOSPADM

## 2021-08-02 RX ORDER — CYANOCOBALAMIN (VITAMIN B-12) 500 MCG
500 TABLET ORAL DAILY
COMMUNITY

## 2021-08-02 RX ORDER — INSULIN LISPRO 100 [IU]/ML
0-14 INJECTION, SOLUTION INTRAVENOUS; SUBCUTANEOUS AS NEEDED
Status: DISCONTINUED | OUTPATIENT
Start: 2021-08-02 | End: 2021-08-03

## 2021-08-02 RX ADMIN — SODIUM CHLORIDE 1000 ML: 9 INJECTION, SOLUTION INTRAVENOUS at 15:34

## 2021-08-02 RX ADMIN — ASPIRIN 324 MG: 81 TABLET, CHEWABLE ORAL at 14:56

## 2021-08-02 RX ADMIN — INSULIN HUMAN 6 UNITS: 100 INJECTION, SOLUTION PARENTERAL at 14:56

## 2021-08-02 RX ADMIN — INSULIN HUMAN 8 UNITS: 100 INJECTION, SOLUTION PARENTERAL at 18:47

## 2021-08-02 NOTE — ED PROVIDER NOTES
"Subjective   Chief Complaint: Hyperglycemia    Patient is a 70-year-old  male history of CAD, hypertension, hyperlipidemia presents the ER with complaints of generalized fatigue and hyperglycemia today.  Patient states that he had outpatient CT abdomen pelvis done to evaluate his pancreas due to abnormal blood work, states that they took his blood sugar at the facility found to be elevated and was told to come to the ER.  Patient denies history of diabetes, does report family history of diabetes.  Patient reports that he has been feeling \"off\" for the last 4 weeks.  He reports diffuse abdominal pain, nausea, weakness and dizziness.  He denies any diarrhea vomiting, hematochezia or melena.  Patient also reports midsternal chest pain that occasionally radiates to his left shoulder.  He describes the chest pain as a achy pain that he rates a 5/10, intermittent.  Patient also reports feelings of polydipsia, polyuria.  He denies any numbness or tingling in his arms or legs.  He denies any falls, head injury or syncope.  He denies any abnormality in his gait or memory issues.    Location: Chest    Quality: Ache    Duration: 4 weeks    Timing: Intermittent    Severity: Mild to moderate    Associated Symptoms: Shortness of breath, abdominal pain, nausea, weakness    PCP: Nellie Adrian      History provided by:  Patient      Review of Systems   Constitutional: Positive for chills and fatigue. Negative for fever.   HENT: Negative for sore throat and trouble swallowing.    Eyes: Negative for visual disturbance.   Respiratory: Positive for shortness of breath. Negative for wheezing.    Cardiovascular: Positive for chest pain. Negative for palpitations.   Gastrointestinal: Positive for abdominal pain and nausea. Negative for diarrhea and vomiting.   Endocrine: Positive for polydipsia and polyuria.   Genitourinary: Negative for dysuria.   Musculoskeletal: Negative for myalgias.   Skin: Negative for rash.   Neurological: " Positive for weakness and light-headedness. Negative for headaches.   Psychiatric/Behavioral: Negative for behavioral problems.   All other systems reviewed and are negative.      Past Medical History:   Diagnosis Date   • Allergic rhinitis    • Degenerative joint disease    • FH: cholecystectomy    • Hypercholesteremia    • Hypertension    • NEERIDA (obstructive sleep apnea)        No Known Allergies    Past Surgical History:   Procedure Laterality Date   • BACK SURGERY         History reviewed. No pertinent family history.    Social History     Socioeconomic History   • Marital status:      Spouse name: Not on file   • Number of children: Not on file   • Years of education: Not on file   • Highest education level: Not on file   Tobacco Use   • Smoking status: Never Smoker   • Smokeless tobacco: Never Used   Vaping Use   • Vaping Use: Never used   Substance and Sexual Activity   • Alcohol use: Defer   • Drug use: Never   • Sexual activity: Defer     Partners: Female           Objective   Physical Exam  Vitals and nursing note reviewed.   Constitutional:       General: He is not in acute distress.     Appearance: Normal appearance. He is normal weight. He is not diaphoretic.   HENT:      Head: Normocephalic and atraumatic.      Right Ear: Tympanic membrane normal.      Left Ear: Tympanic membrane normal.      Nose: Nose normal.      Mouth/Throat:      Mouth: Mucous membranes are moist.      Pharynx: No oropharyngeal exudate or posterior oropharyngeal erythema.   Eyes:      Extraocular Movements: Extraocular movements intact.      Pupils: Pupils are equal, round, and reactive to light.   Cardiovascular:      Rate and Rhythm: Normal rate and regular rhythm.      Pulses: Normal pulses.      Heart sounds: Normal heart sounds. No murmur heard.     Pulmonary:      Effort: Pulmonary effort is normal.      Breath sounds: Normal breath sounds. No stridor. No wheezing.   Abdominal:      General: Abdomen is flat.       Tenderness: There is abdominal tenderness. There is no right CVA tenderness or left CVA tenderness.      Comments: Generalized abdominal tenderness, no localized tenderness no distention masses rebounding or guarding   Musculoskeletal:         General: Normal range of motion.      Cervical back: Normal range of motion.   Skin:     General: Skin is warm.      Capillary Refill: Capillary refill takes less than 2 seconds.   Neurological:      General: No focal deficit present.      Mental Status: He is alert and oriented to person, place, and time.   Psychiatric:         Mood and Affect: Mood normal.         Behavior: Behavior normal.         ECG 12 Lead      Date/Time: 8/2/2021 6:59 PM  Performed by: Mari Johnson PA  Authorized by: Marlys Stallings MD   Interpreted by physician  Rhythm: sinus rhythm  Rate: normal  BPM: 80  QRS axis: normal  Conduction: conduction normal  ST Segments: ST segments normal  T Waves: T waves normal  Other: no other findings  Clinical impression: normal ECG                 ED Course  ED Course as of Aug 02 1937   Mon Aug 02, 2021   1443 CT Abd and Pelvis done today 8/2/21 at [facility]  CT ABDOMEN WITH CONTRAST     Date of Exam: 8/2/2021 8:53 EDT     Indication: Elevated lipase, epigastric pain; extreme fatigue; excessive thirst and elevated BGL.     Comparison Exams: Right upper quadrant ultrasound from April 21, 2021     Technique: CT scan of the abdomen was performed after the uneventful administration of 85 mL of IV Isovue 370. Automated exposure control and iterative reconstruction methods were used. Radiation audit for number of CT and nuclear cardiology exams performed in the last year: 0.     FINDINGS:   The lung bases are clear.     There is hepatic steatosis. The gallbladder is surgically absent. The adrenal glands, kidneys, spleen, and pancreas are unremarkable.     The stomach appears normal. The visualized small and large bowel is unremarkable. There is no ascites or  "loculated collection. No abnormally enlarged lymph nodes are identified. There is atherosclerotic plaque in the abdominal aorta.     No aggressive osseous lesions are identified.     IMPRESSION:   1. No acute process identified within abdomen.   2. Hepatic steatosis.     Electronically Signed: Ashvin Bernstein MD 8/2/2021 10:29 EDT     [MM]   1812 Patient reevaluated, no complaints at this time.  Patient recommended admission.  Patient agreeable.    [MM]   1834 Spoke with Dr. Oliver regarding patient admission    [MM]      ED Course User Index  [MM] Mari Johnson PA    /70   Pulse 88   Temp 97.6 °F (36.4 °C)   Resp 18   Ht 182.9 cm (72\")   Wt 96.1 kg (211 lb 13.8 oz)   SpO2 99%   BMI 28.73 kg/m²   Labs Reviewed   COMPREHENSIVE METABOLIC PANEL - Abnormal; Notable for the following components:       Result Value    Glucose 463 (*)     Sodium 128 (*)     Chloride 88 (*)     CO2 18.0 (*)     ALT (SGPT) 46 (*)     Anion Gap 22.0 (*)     All other components within normal limits    Narrative:     GFR Normal >60  Chronic Kidney Disease <60  Kidney Failure <15     LIPASE - Abnormal; Notable for the following components:    Lipase 76 (*)     All other components within normal limits   URINALYSIS W/ CULTURE IF INDICATED - Abnormal; Notable for the following components:    Specific Gravity, UA 1.047 (*)     Glucose, UA >=1000 mg/dL (3+) (*)     Ketones, UA 80 mg/dL (3+) (*)     Bilirubin, UA Moderate (2+) (*)     All other components within normal limits    Narrative:     Urine microscopic not indicated.   PHOSPHORUS - Abnormal; Notable for the following components:    Phosphorus 4.6 (*)     All other components within normal limits   ACETONE - Abnormal; Notable for the following components:    Acetone Small (*)     All other components within normal limits   OSMOLALITY, SERUM - Abnormal; Notable for the following components:    Osmolality 306 (*)     All other components within normal limits   HEMOGLOBIN A1C " - Abnormal; Notable for the following components:    Hemoglobin A1C 13.0 (*)     All other components within normal limits    Narrative:     Hemoglobin A1C Reference Range:    <5.7 %        Normal  5.7-6.4 %     Increased risk for diabetes  > 6.4 %        Diabetes       These guidelines have been recommended by the American Diabetic Association for Hgb A1c.      The following 2010 guidelines have been recommended by the American Diabetes Association for Hemoglobin A1c.    HBA1c 5.7-6.4% Increased risk for future diabetes (pre-diabetes)  HBA1c     >6.4% Diabetes     BLOOD GAS, VENOUS - Abnormal; Notable for the following components:    pCO2, Venous 35.4 (*)     pO2, Venous 36.1 (*)     HCO3, Venous 18.9 (*)     Base Excess, Venous -6.2 (*)     O2 Saturation, Venous 65.9 (*)     CO2 Content 20.0 (*)     All other components within normal limits   POCT GLUCOSE FINGERSTICK - Abnormal; Notable for the following components:    Glucose 471 (*)     All other components within normal limits   POCT GLUCOSE FINGERSTICK - Abnormal; Notable for the following components:    Glucose 458 (*)     All other components within normal limits   POCT GLUCOSE FINGERSTICK - Abnormal; Notable for the following components:    Glucose 443 (*)     All other components within normal limits   POCT GLUCOSE FINGERSTICK - Abnormal; Notable for the following components:    Glucose 345 (*)     All other components within normal limits   TROPONIN (IN-HOUSE) - Normal    Narrative:     Troponin T Reference Range:  <= 0.03 ng/mL-   Negative for AMI  >0.03 ng/mL-     Abnormal for myocardial necrosis.  Clinicians would have to utilize clinical acumen, EKG, Troponin and serial changes to determine if it is an Acute Myocardial Infarction or myocardial injury due to an underlying chronic condition.       Results may be falsely decreased if patient taking Biotin.     TROPONIN (IN-HOUSE) - Normal    Narrative:     Troponin T Reference Range:  <= 0.03 ng/mL-    Negative for AMI  >0.03 ng/mL-     Abnormal for myocardial necrosis.  Clinicians would have to utilize clinical acumen, EKG, Troponin and serial changes to determine if it is an Acute Myocardial Infarction or myocardial injury due to an underlying chronic condition.       Results may be falsely decreased if patient taking Biotin.     PROTIME-INR - Normal   BNP (IN-HOUSE) - Normal    Narrative:     Among patients with dyspnea, NT-proBNP is highly sensitive for the detection of acute congestive heart failure. In addition NT-proBNP of <300 pg/ml effectively rules out acute congestive heart failure with 99% negative predictive value.    Results may be falsely decreased if patient taking Biotin.     CK - Normal   TSH - Normal   MAGNESIUM - Normal   CBC WITH AUTO DIFFERENTIAL - Normal   COVID PRE-OP / PRE-PROCEDURE SCREENING ORDER (NO ISOLATION)    Narrative:     The following orders were created for panel order COVID PRE-OP / PRE-PROCEDURE SCREENING ORDER (NO ISOLATION) - Swab, Nasopharynx.  Procedure                               Abnormality         Status                     ---------                               -----------         ------                     COVID-19,CEPHEID,COR/LORENZO...[[id number]]                      In process                   Please view results for these tests on the individual orders.   COVID-19,CEPHEID,COR/LORENZO/PAD/BRAD IN-HOUSE,NP SWAB IN TRANSPORT MEDIA 3-4 HR TAT, RT-PCR   RAINBOW DRAW    Narrative:     The following orders were created for panel order [place] Draw.  Procedure                               Abnormality         Status                     ---------                               -----------         ------                     Green Top (Gel)[[id number]]                                  Final result               Lavender Top[[id number]]                                     Final result               Gold Top - SST[[id number]]                                   Final result                  Please view results for these tests on the individual orders.   BLOOD GAS, VENOUS   POCT GLUCOSE FINGERSTICK   POCT GLUCOSE FINGERSTICK   POCT GLUCOSE FINGERSTICK   POCT GLUCOSE FINGERSTICK   POCT GLUCOSE FINGERSTICK   POCT GLUCOSE FINGERSTICK   POCT GLUCOSE FINGERSTICK   POCT GLUCOSE FINGERSTICK   POCT GLUCOSE FINGERSTICK   POCT GLUCOSE FINGERSTICK   POCT GLUCOSE FINGERSTICK   POCT GLUCOSE FINGERSTICK   POCT GLUCOSE FINGERSTICK   POCT GLUCOSE FINGERSTICK   POCT GLUCOSE FINGERSTICK   POCT GLUCOSE FINGERSTICK   POCT GLUCOSE FINGERSTICK   POCT GLUCOSE FINGERSTICK   POCT GLUCOSE FINGERSTICK   POCT GLUCOSE FINGERSTICK   POCT GLUCOSE FINGERSTICK   POCT GLUCOSE FINGERSTICK   POCT GLUCOSE FINGERSTICK   POCT GLUCOSE FINGERSTICK   POCT GLUCOSE FINGERSTICK   POCT GLUCOSE FINGERSTICK   POCT GLUCOSE FINGERSTICK   POCT GLUCOSE FINGERSTICK   POCT GLUCOSE FINGERSTICK   POCT GLUCOSE FINGERSTICK   CBC AND DIFFERENTIAL    Narrative:     The following orders were created for panel order CBC & Differential.  Procedure                               Abnormality         Status                     ---------                               -----------         ------                     CBC Auto Differential[[id number]]        Normal              Final result                 Please view results for these tests on the individual orders.   KETONE BODIES SERUM    Narrative:     The following orders were created for panel order Ketone Bodies, Serum (Not performed at [place]).  Procedure                               Abnormality         Status                     ---------                               -----------         ------                     Acetone[[id number]]                      Abnormal            Final result                 Please view results for these tests on the individual orders.   GREEN TOP   LAVENDER TOP   GOLD TOP - SST     Medications   sodium chloride 0.9 % flush 10 mL (has no administration in time range)   aspirin  chewable tablet 324 mg (324 mg Oral Given 8/2/21 1456)   insulin regular (humuLIN R,novoLIN R) injection 6 Units (6 Units Subcutaneous Given 8/2/21 1456)   sodium chloride 0.9 % bolus 1,000 mL (0 mL Intravenous Stopped 8/2/21 1847)   insulin regular (humuLIN R,novoLIN R) injection 8 Units (8 Units Subcutaneous Given 8/2/21 1847)     XR Chest 1 View    Result Date: 8/2/2021  No acute chest finding.  Electronically Signed By-Latisha Wells MD On:8/2/2021 3:00 PM This report was finalized on [id number] by  Latisha Wells MD.                                           MDM  Number of Diagnoses or Management Options  Chest pain, unspecified type  Hyperglycemia  Hyponatremia  Weakness  Diagnosis management comments: MEDICAL DECISION  Epic Chart Review: CT scan done today, see work-up tab, essentially normal  Comorbidities: Hypertension, hypercholesteremia, CAD  Differentials: Diabetes hyperglycemia, DKA, UTI, dehydration, electrolyte abnormality; this list is not all inclusive and does not constitute the entirety of considered causes  Radiology interpretation:  Images reviewed by me and interpreted by radiologist, as above  Lab interpretation:  Labs viewed by me significant for, as above  EKG interpretation: Reviewed myself interpreted by Dr. Tate normal sinus rhythm, rate of 80 with no acute ST changes    While in the ED IV was placed and labs were obtained appropriate PPE was worn during exam and throughout all encounters with the patient.  Patient had the above evaluation.  IV established, routine.  Patient given 1 L IV fluids, POC glucose greater than 450, given 6 units subcu Humulin.  Patient placed on continuous telemetry monitoring throughout entire ER stay.  Lab work obtained, significant for persistent hyperglycemia, sodium 128, CO2 18, anion gap 22.0.  Troponin less than 0.01.  BNP normal 76.1.  Ketone bodies small.  CBC essentially normal.  Serum osmolality 306.  Chest x-ray negative for acute  cardiopulmonary abnormalities.  CT [facility] pelvis performed at [facility] earlier today negative for acute intra-abdominal abnormalities.  Repeat POC glucose 443, patient given additional 8 units subcu Humulin.  Patient does not have history of diabetes, A1c 13.  Patient was reevaluated, reports that he feels much better, states his chest pain has improved, abdomen feels much better.  Patient had no episodes of emesis or diarrhea while in the ER.  Patient be admitted for chest pain rule out as well as IV fluids, management of persistent hyperglycemia.  Patient does not appear to be in DKA, VBG pH is normal 7.33.. I spoke with Dr. Oliver who presents the patient for Dr. Bowers.  Patient stable on admission.         Amount and/or Complexity of Data Reviewed  Clinical lab tests: ordered and reviewed  Tests in the radiology section of CPT®: ordered and reviewed    Patient Progress  Patient progress: stable      Final diagnoses:   Hyperglycemia   Chest pain, unspecified type   Hyponatremia   Weakness       ED Disposition  ED Disposition     ED Disposition Condition Comment    Decision to Admit  Level of Care: Med/Surg [1]   Diagnosis: Hyperglycemia [[id number]]   Admitting Physician: BILLY BOWERS [[id number]]   Attending Physician: BILLY BOWERS [[id number]]            No follow-up provider specified.       Medication List      No changes were made to your prescriptions during this visit.          Mari Johnson PA  08/02/21 1937

## 2021-08-03 ENCOUNTER — APPOINTMENT (OUTPATIENT)
Dept: NUCLEAR MEDICINE | Facility: HOSPITAL | Age: 71
End: 2021-08-03

## 2021-08-03 LAB
ALBUMIN SERPL-MCNC: 3.9 G/DL (ref 3.5–5.2)
ALBUMIN/GLOB SERPL: 1.6 G/DL
ALP SERPL-CCNC: 106 U/L (ref 39–117)
ALT SERPL W P-5'-P-CCNC: 41 U/L (ref 1–41)
ANION GAP SERPL CALCULATED.3IONS-SCNC: 17 MMOL/L (ref 5–15)
ARTERIAL PATENCY WRIST A: POSITIVE
AST SERPL-CCNC: 30 U/L (ref 1–40)
ATMOSPHERIC PRESS: ABNORMAL MM[HG]
BASE EXCESS BLDA CALC-SCNC: -6.4 MMOL/L (ref 0–3)
BASOPHILS # BLD AUTO: 0.1 10*3/MM3 (ref 0–0.2)
BASOPHILS NFR BLD AUTO: 1.3 % (ref 0–1.5)
BDY SITE: ABNORMAL
BH CV NUCLEAR PRIOR STUDY: 3
BH CV REST NUCLEAR ISOTOPE DOSE: 7.7 MCI
BH CV STRESS BP STAGE 1: NORMAL
BH CV STRESS BP STAGE 2: NORMAL
BH CV STRESS BP STAGE 3: NORMAL
BH CV STRESS COMMENTS STAGE 1: NORMAL
BH CV STRESS COMMENTS STAGE 2: NORMAL
BH CV STRESS DOSE REGADENOSON STAGE 1: 0.4
BH CV STRESS DURATION MIN STAGE 1: 0
BH CV STRESS DURATION MIN STAGE 2: 4
BH CV STRESS DURATION SEC STAGE 1: 10
BH CV STRESS DURATION SEC STAGE 2: 0
BH CV STRESS HR STAGE 1: 81
BH CV STRESS HR STAGE 2: 104
BH CV STRESS HR STAGE 3: 99
BH CV STRESS NUCLEAR ISOTOPE DOSE: 21.2 MCI
BH CV STRESS PROTOCOL 1: NORMAL
BH CV STRESS RECOVERY BP: NORMAL MMHG
BH CV STRESS RECOVERY HR: 96 BPM
BH CV STRESS STAGE 1: 1
BH CV STRESS STAGE 2: 2
BH CV STRESS STAGE 3: 3
BILIRUB SERPL-MCNC: 0.4 MG/DL (ref 0–1.2)
BUN SERPL-MCNC: 15 MG/DL (ref 8–23)
BUN/CREAT SERPL: 14.4 (ref 7–25)
CALCIUM SPEC-SCNC: 8.8 MG/DL (ref 8.6–10.5)
CHLORIDE SERPL-SCNC: 97 MMOL/L (ref 98–107)
CO2 BLDA-SCNC: 18.4 MMOL/L (ref 22–29)
CO2 SERPL-SCNC: 21 MMOL/L (ref 22–29)
CREAT SERPL-MCNC: 1.04 MG/DL (ref 0.76–1.27)
DEPRECATED RDW RBC AUTO: 42.9 FL (ref 37–54)
EOSINOPHIL # BLD AUTO: 0.2 10*3/MM3 (ref 0–0.4)
EOSINOPHIL NFR BLD AUTO: 2.3 % (ref 0.3–6.2)
ERYTHROCYTE [DISTWIDTH] IN BLOOD BY AUTOMATED COUNT: 13.6 % (ref 12.3–15.4)
GFR SERPL CREATININE-BSD FRML MDRD: 71 ML/MIN/1.73
GLOBULIN UR ELPH-MCNC: 2.5 GM/DL
GLUCOSE BLDC GLUCOMTR-MCNC: 305 MG/DL (ref 70–105)
GLUCOSE BLDC GLUCOMTR-MCNC: 339 MG/DL (ref 70–105)
GLUCOSE BLDC GLUCOMTR-MCNC: 347 MG/DL (ref 70–105)
GLUCOSE BLDC GLUCOMTR-MCNC: 356 MG/DL (ref 70–105)
GLUCOSE BLDC GLUCOMTR-MCNC: 412 MG/DL (ref 70–105)
GLUCOSE BLDC GLUCOMTR-MCNC: 443 MG/DL (ref 70–105)
GLUCOSE SERPL-MCNC: 388 MG/DL (ref 65–99)
HCO3 BLDA-SCNC: 17.5 MMOL/L (ref 21–28)
HCT VFR BLD AUTO: 39.4 % (ref 37.5–51)
HEMODILUTION: NO
HGB BLD-MCNC: 13.8 G/DL (ref 13–17.7)
INHALED O2 CONCENTRATION: 21 %
LV EF NUC BP: 40 %
LYMPHOCYTES # BLD AUTO: 1.8 10*3/MM3 (ref 0.7–3.1)
LYMPHOCYTES NFR BLD AUTO: 23.4 % (ref 19.6–45.3)
MAGNESIUM SERPL-MCNC: 2 MG/DL (ref 1.6–2.4)
MAXIMAL PREDICTED HEART RATE: 150 BPM
MCH RBC QN AUTO: 31.6 PG (ref 26.6–33)
MCHC RBC AUTO-ENTMCNC: 35 G/DL (ref 31.5–35.7)
MCV RBC AUTO: 90.4 FL (ref 79–97)
MODALITY: ABNORMAL
MONOCYTES # BLD AUTO: 0.8 10*3/MM3 (ref 0.1–0.9)
MONOCYTES NFR BLD AUTO: 10.5 % (ref 5–12)
NEUTROPHILS NFR BLD AUTO: 4.7 10*3/MM3 (ref 1.7–7)
NEUTROPHILS NFR BLD AUTO: 62.5 % (ref 42.7–76)
NRBC BLD AUTO-RTO: 0.1 /100 WBC (ref 0–0.2)
PCO2 BLDA: 29.6 MM HG (ref 35–48)
PERCENT MAX PREDICTED HR: 69.33 %
PH BLDA: 7.38 PH UNITS (ref 7.35–7.45)
PHOSPHATE SERPL-MCNC: 3.7 MG/DL (ref 2.5–4.5)
PLATELET # BLD AUTO: 259 10*3/MM3 (ref 140–450)
PMV BLD AUTO: 9 FL (ref 6–12)
PO2 BLDA: 81.5 MM HG (ref 83–108)
POTASSIUM SERPL-SCNC: 4.4 MMOL/L (ref 3.5–5.2)
PROT SERPL-MCNC: 6.4 G/DL (ref 6–8.5)
QT INTERVAL: 313 MS
RBC # BLD AUTO: 4.35 10*6/MM3 (ref 4.14–5.8)
SAO2 % BLDCOA: 95.9 % (ref 94–98)
SODIUM SERPL-SCNC: 135 MMOL/L (ref 136–145)
STRESS BASELINE BP: NORMAL MMHG
STRESS BASELINE HR: 78 BPM
STRESS PERCENT HR: 82 %
STRESS POST PEAK BP: NORMAL MMHG
STRESS POST PEAK HR: 104 BPM
STRESS TARGET HR: 128 BPM
TROPONIN T SERPL-MCNC: <0.01 NG/ML (ref 0–0.03)
WBC # BLD AUTO: 7.6 10*3/MM3 (ref 3.4–10.8)

## 2021-08-03 PROCEDURE — 25010000002 ENOXAPARIN PER 10 MG: Performed by: NURSE PRACTITIONER

## 2021-08-03 PROCEDURE — 63710000001 INSULIN GLARGINE PER 5 UNITS: Performed by: NURSE PRACTITIONER

## 2021-08-03 PROCEDURE — 78452 HT MUSCLE IMAGE SPECT MULT: CPT | Performed by: INTERNAL MEDICINE

## 2021-08-03 PROCEDURE — 99204 OFFICE O/P NEW MOD 45 MIN: CPT | Performed by: INTERNAL MEDICINE

## 2021-08-03 PROCEDURE — 63710000001 INSULIN LISPRO (HUMAN) PER 5 UNITS: Performed by: FAMILY MEDICINE

## 2021-08-03 PROCEDURE — G0378 HOSPITAL OBSERVATION PER HR: HCPCS

## 2021-08-03 PROCEDURE — 83735 ASSAY OF MAGNESIUM: CPT | Performed by: NURSE PRACTITIONER

## 2021-08-03 PROCEDURE — 96361 HYDRATE IV INFUSION ADD-ON: CPT

## 2021-08-03 PROCEDURE — 63710000001 INSULIN LISPRO (HUMAN) PER 5 UNITS: Performed by: INTERNAL MEDICINE

## 2021-08-03 PROCEDURE — G0108 DIAB MANAGE TRN  PER INDIV: HCPCS

## 2021-08-03 PROCEDURE — 0 TECHNETIUM TETROFOSMIN KIT: Performed by: INTERNAL MEDICINE

## 2021-08-03 PROCEDURE — 96372 THER/PROPH/DIAG INJ SC/IM: CPT

## 2021-08-03 PROCEDURE — 82803 BLOOD GASES ANY COMBINATION: CPT

## 2021-08-03 PROCEDURE — 82962 GLUCOSE BLOOD TEST: CPT

## 2021-08-03 PROCEDURE — 93017 CV STRESS TEST TRACING ONLY: CPT

## 2021-08-03 PROCEDURE — 84484 ASSAY OF TROPONIN QUANT: CPT | Performed by: NURSE PRACTITIONER

## 2021-08-03 PROCEDURE — 36600 WITHDRAWAL OF ARTERIAL BLOOD: CPT

## 2021-08-03 PROCEDURE — 78452 HT MUSCLE IMAGE SPECT MULT: CPT

## 2021-08-03 PROCEDURE — 93018 CV STRESS TEST I&R ONLY: CPT | Performed by: INTERNAL MEDICINE

## 2021-08-03 PROCEDURE — A9502 TC99M TETROFOSMIN: HCPCS | Performed by: INTERNAL MEDICINE

## 2021-08-03 PROCEDURE — 93016 CV STRESS TEST SUPVJ ONLY: CPT | Performed by: NURSE PRACTITIONER

## 2021-08-03 PROCEDURE — 96360 HYDRATION IV INFUSION INIT: CPT

## 2021-08-03 PROCEDURE — 85025 COMPLETE CBC W/AUTO DIFF WBC: CPT | Performed by: NURSE PRACTITIONER

## 2021-08-03 PROCEDURE — 25010000002 REGADENOSON 0.4 MG/5ML SOLUTION: Performed by: INTERNAL MEDICINE

## 2021-08-03 PROCEDURE — 84100 ASSAY OF PHOSPHORUS: CPT | Performed by: NURSE PRACTITIONER

## 2021-08-03 PROCEDURE — 99213 OFFICE O/P EST LOW 20 MIN: CPT | Performed by: INTERNAL MEDICINE

## 2021-08-03 PROCEDURE — 80053 COMPREHEN METABOLIC PANEL: CPT | Performed by: NURSE PRACTITIONER

## 2021-08-03 RX ORDER — SACCHAROMYCES BOULARDII 250 MG
250 CAPSULE ORAL DAILY
Status: DISCONTINUED | OUTPATIENT
Start: 2021-08-03 | End: 2021-08-04 | Stop reason: HOSPADM

## 2021-08-03 RX ORDER — MAGNESIUM SULFATE 1 G/100ML
1 INJECTION INTRAVENOUS AS NEEDED
Status: DISCONTINUED | OUTPATIENT
Start: 2021-08-03 | End: 2021-08-04 | Stop reason: HOSPADM

## 2021-08-03 RX ORDER — INSULIN GLARGINE 100 [IU]/ML
10 INJECTION, SOLUTION SUBCUTANEOUS
Status: DISCONTINUED | OUTPATIENT
Start: 2021-08-03 | End: 2021-08-03

## 2021-08-03 RX ORDER — NITROGLYCERIN 0.4 MG/1
0.4 TABLET SUBLINGUAL
Status: DISCONTINUED | OUTPATIENT
Start: 2021-08-03 | End: 2021-08-04 | Stop reason: HOSPADM

## 2021-08-03 RX ORDER — INSULIN GLARGINE 100 [IU]/ML
30 INJECTION, SOLUTION SUBCUTANEOUS
Status: DISCONTINUED | OUTPATIENT
Start: 2021-08-04 | End: 2021-08-04 | Stop reason: HOSPADM

## 2021-08-03 RX ORDER — POTASSIUM CHLORIDE 20 MEQ/1
40 TABLET, EXTENDED RELEASE ORAL AS NEEDED
Status: DISCONTINUED | OUTPATIENT
Start: 2021-08-03 | End: 2021-08-04 | Stop reason: HOSPADM

## 2021-08-03 RX ORDER — SODIUM CHLORIDE 9 MG/ML
150 INJECTION, SOLUTION INTRAVENOUS CONTINUOUS
Status: DISCONTINUED | OUTPATIENT
Start: 2021-08-03 | End: 2021-08-04 | Stop reason: HOSPADM

## 2021-08-03 RX ORDER — HYDRALAZINE HYDROCHLORIDE 20 MG/ML
20 INJECTION INTRAMUSCULAR; INTRAVENOUS EVERY 6 HOURS PRN
Status: DISCONTINUED | OUTPATIENT
Start: 2021-08-03 | End: 2021-08-04 | Stop reason: HOSPADM

## 2021-08-03 RX ORDER — POTASSIUM CHLORIDE 750 MG/1
10 TABLET, FILM COATED, EXTENDED RELEASE ORAL DAILY
Status: DISCONTINUED | OUTPATIENT
Start: 2021-08-03 | End: 2021-08-04 | Stop reason: HOSPADM

## 2021-08-03 RX ORDER — METOPROLOL SUCCINATE 50 MG/1
50 TABLET, EXTENDED RELEASE ORAL DAILY
Status: DISCONTINUED | OUTPATIENT
Start: 2021-08-03 | End: 2021-08-04 | Stop reason: HOSPADM

## 2021-08-03 RX ORDER — HYDROMORPHONE HCL 110MG/55ML
0.5 PATIENT CONTROLLED ANALGESIA SYRINGE INTRAVENOUS
Status: DISCONTINUED | OUTPATIENT
Start: 2021-08-03 | End: 2021-08-04 | Stop reason: HOSPADM

## 2021-08-03 RX ORDER — PEN NEEDLE, DIABETIC 30 GX3/16"
1 NEEDLE, DISPOSABLE MISCELLANEOUS 4 TIMES DAILY
Qty: 200 EACH | Refills: 2 | Status: CANCELLED | OUTPATIENT
Start: 2021-08-03

## 2021-08-03 RX ORDER — POTASSIUM CHLORIDE 7.45 MG/ML
10 INJECTION INTRAVENOUS
Status: DISCONTINUED | OUTPATIENT
Start: 2021-08-03 | End: 2021-08-04 | Stop reason: HOSPADM

## 2021-08-03 RX ORDER — ASPIRIN 81 MG/1
81 TABLET ORAL DAILY
Status: DISCONTINUED | OUTPATIENT
Start: 2021-08-03 | End: 2021-08-04 | Stop reason: HOSPADM

## 2021-08-03 RX ORDER — INSULIN LISPRO 100 U/ML
INJECTION, SOLUTION SUBCUTANEOUS
Status: CANCELLED | OUTPATIENT
Start: 2021-08-03

## 2021-08-03 RX ORDER — DIPHENOXYLATE HYDROCHLORIDE AND ATROPINE SULFATE 2.5; .025 MG/1; MG/1
1 TABLET ORAL DAILY
Status: DISCONTINUED | OUTPATIENT
Start: 2021-08-03 | End: 2021-08-04 | Stop reason: HOSPADM

## 2021-08-03 RX ORDER — CALCIUM CARBONATE 200(500)MG
2 TABLET,CHEWABLE ORAL 3 TIMES DAILY PRN
Status: DISCONTINUED | OUTPATIENT
Start: 2021-08-03 | End: 2021-08-04 | Stop reason: HOSPADM

## 2021-08-03 RX ORDER — ONDANSETRON 2 MG/ML
4 INJECTION INTRAMUSCULAR; INTRAVENOUS EVERY 6 HOURS PRN
Status: DISCONTINUED | OUTPATIENT
Start: 2021-08-03 | End: 2021-08-04 | Stop reason: HOSPADM

## 2021-08-03 RX ORDER — LANCETS
1 EACH MISCELLANEOUS
Qty: 100 EACH | Refills: 2 | Status: CANCELLED | OUTPATIENT
Start: 2021-08-03

## 2021-08-03 RX ORDER — POTASSIUM CHLORIDE 1.5 G/1.77G
40 POWDER, FOR SOLUTION ORAL AS NEEDED
Status: DISCONTINUED | OUTPATIENT
Start: 2021-08-03 | End: 2021-08-04 | Stop reason: HOSPADM

## 2021-08-03 RX ORDER — CHOLECALCIFEROL (VITAMIN D3) 125 MCG
5 CAPSULE ORAL NIGHTLY PRN
Status: DISCONTINUED | OUTPATIENT
Start: 2021-08-03 | End: 2021-08-04 | Stop reason: HOSPADM

## 2021-08-03 RX ORDER — INSULIN LISPRO 100 [IU]/ML
10 INJECTION, SOLUTION INTRAVENOUS; SUBCUTANEOUS
Status: DISCONTINUED | OUTPATIENT
Start: 2021-08-03 | End: 2021-08-04 | Stop reason: HOSPADM

## 2021-08-03 RX ORDER — CETIRIZINE HYDROCHLORIDE 10 MG/1
10 TABLET ORAL DAILY
Status: DISCONTINUED | OUTPATIENT
Start: 2021-08-03 | End: 2021-08-04 | Stop reason: HOSPADM

## 2021-08-03 RX ORDER — BLOOD SUGAR DIAGNOSTIC
1 STRIP MISCELLANEOUS
Qty: 100 EACH | Refills: 2 | Status: CANCELLED | OUTPATIENT
Start: 2021-08-03

## 2021-08-03 RX ORDER — ATORVASTATIN CALCIUM 40 MG/1
40 TABLET, FILM COATED ORAL DAILY
Status: DISCONTINUED | OUTPATIENT
Start: 2021-08-03 | End: 2021-08-04 | Stop reason: HOSPADM

## 2021-08-03 RX ORDER — ACETAMINOPHEN 325 MG/1
650 TABLET ORAL EVERY 6 HOURS PRN
Status: DISCONTINUED | OUTPATIENT
Start: 2021-08-03 | End: 2021-08-04 | Stop reason: HOSPADM

## 2021-08-03 RX ORDER — MAGNESIUM SULFATE HEPTAHYDRATE 40 MG/ML
2 INJECTION, SOLUTION INTRAVENOUS AS NEEDED
Status: DISCONTINUED | OUTPATIENT
Start: 2021-08-03 | End: 2021-08-04 | Stop reason: HOSPADM

## 2021-08-03 RX ORDER — PANTOPRAZOLE SODIUM 40 MG/1
40 TABLET, DELAYED RELEASE ORAL
Status: DISCONTINUED | OUTPATIENT
Start: 2021-08-03 | End: 2021-08-04 | Stop reason: HOSPADM

## 2021-08-03 RX ADMIN — ATORVASTATIN CALCIUM 40 MG: 40 TABLET, FILM COATED ORAL at 09:57

## 2021-08-03 RX ADMIN — Medication 10 ML: at 05:41

## 2021-08-03 RX ADMIN — LISINOPRIL: 20 TABLET ORAL at 09:57

## 2021-08-03 RX ADMIN — SODIUM CHLORIDE 150 ML/HR: 9 INJECTION, SOLUTION INTRAVENOUS at 14:50

## 2021-08-03 RX ADMIN — POTASSIUM CHLORIDE 10 MEQ: 750 TABLET, EXTENDED RELEASE ORAL at 09:57

## 2021-08-03 RX ADMIN — INSULIN GLARGINE 10 UNITS: 100 INJECTION, SOLUTION SUBCUTANEOUS at 09:57

## 2021-08-03 RX ADMIN — PANTOPRAZOLE SODIUM 40 MG: 40 TABLET, DELAYED RELEASE ORAL at 09:57

## 2021-08-03 RX ADMIN — ENOXAPARIN SODIUM 40 MG: 40 INJECTION SUBCUTANEOUS at 17:52

## 2021-08-03 RX ADMIN — CYANOCOBALAMIN TAB 250 MCG 500 MCG: 250 TAB at 09:57

## 2021-08-03 RX ADMIN — TETROFOSMIN 1 DOSE: 1.38 INJECTION, POWDER, LYOPHILIZED, FOR SOLUTION INTRAVENOUS at 07:21

## 2021-08-03 RX ADMIN — INSULIN LISPRO 10 UNITS: 100 INJECTION, SOLUTION INTRAVENOUS; SUBCUTANEOUS at 17:52

## 2021-08-03 RX ADMIN — METOPROLOL SUCCINATE 50 MG: 50 TABLET, EXTENDED RELEASE ORAL at 09:57

## 2021-08-03 RX ADMIN — CETIRIZINE HYDROCHLORIDE 10 MG: 10 TABLET, FILM COATED ORAL at 09:57

## 2021-08-03 RX ADMIN — TETROFOSMIN 1 DOSE: 1.38 INJECTION, POWDER, LYOPHILIZED, FOR SOLUTION INTRAVENOUS at 08:20

## 2021-08-03 RX ADMIN — SODIUM CHLORIDE 500 ML: 9 INJECTION, SOLUTION INTRAVENOUS at 05:41

## 2021-08-03 RX ADMIN — SODIUM CHLORIDE 150 ML/HR: 9 INJECTION, SOLUTION INTRAVENOUS at 22:41

## 2021-08-03 RX ADMIN — ASPIRIN 81 MG: 81 TABLET, COATED ORAL at 09:57

## 2021-08-03 RX ADMIN — SODIUM CHLORIDE 150 ML/HR: 9 INJECTION, SOLUTION INTRAVENOUS at 22:43

## 2021-08-03 RX ADMIN — INSULIN LISPRO 14 UNITS: 100 INJECTION, SOLUTION INTRAVENOUS; SUBCUTANEOUS at 09:58

## 2021-08-03 RX ADMIN — INSULIN LISPRO 14 UNITS: 100 INJECTION, SOLUTION INTRAVENOUS; SUBCUTANEOUS at 12:47

## 2021-08-03 RX ADMIN — INSULIN LISPRO 10 UNITS: 100 INJECTION, SOLUTION INTRAVENOUS; SUBCUTANEOUS at 13:14

## 2021-08-03 RX ADMIN — Medication 250 MG: at 09:57

## 2021-08-03 RX ADMIN — INSULIN LISPRO 12 UNITS: 100 INJECTION, SOLUTION INTRAVENOUS; SUBCUTANEOUS at 17:52

## 2021-08-03 RX ADMIN — THERA TABS 1 TABLET: TAB at 09:57

## 2021-08-03 RX ADMIN — REGADENOSON 0.4 MG: 0.08 INJECTION, SOLUTION INTRAVENOUS at 08:20

## 2021-08-03 NOTE — CONSULTS
Inpatient Endocrine Consult  Consultation request by Dr. Stallings for uncontrolled diabetes  Patient Care Team:  Nellie Adrian MD as PCP - General (Family Medicine)    Chief Complaint: Uncontrolled diabetes    HPI: 70-year-old male with history of hypertension, hyperlipidemia, CAD came in to the hospital with generalized fatigue and hyperglycemia apparently had a CT of the abdomen and pelvis as an outpatient and was told that blood sugars was high and come to the ER.  Patient tells me that he has been having fatigue and tiredness with polyuria, polydipsia and has lost about 27 pounds of weight in last 27 days.  He was found to have significant hyperglycemia and is admitted for further evaluation management.  He does not have history of diabetes and has not been taking any medications for diabetes.    Past Medical History:   Diagnosis Date   • Allergic rhinitis    • Degenerative joint disease    • FH: cholecystectomy    • Hypercholesteremia    • Hypertension    • NEREIDA (obstructive sleep apnea)        Social History     Socioeconomic History   • Marital status:      Spouse name: Not on file   • Number of children: Not on file   • Years of education: Not on file   • Highest education level: Not on file   Tobacco Use   • Smoking status: Never Smoker   • Smokeless tobacco: Never Used   Vaping Use   • Vaping Use: Never used   Substance and Sexual Activity   • Alcohol use: Not Currently   • Drug use: Never   • Sexual activity: Defer     Partners: Female       History reviewed. No pertinent family history.    No Known Allergies    ROS:   Constitutional:   Admit fatigue, tiredness.  Admit weight loss  Eyes:  Denies change in visual acuity   HENT:  Denies nasal congestion or sore throat   Respiratory: denies cough, shortness of breath.   Cardiovascular:  denies chest pain, edema   GI:  Denies abdominal pain, nausea, vomiting.   :   Admit polyuria and Polydipsia  Musculoskeletal:  Denies back pain or joint  pain   Integument:  Denies dry skin, rash   Neurologic:  Denies headache, focal weakness or sensory changes   Endocrine:   Admit polyuria and polydipsia  Psychiatric:  Denies depression or anxiety      Vitals:    08/03/21 1229   BP: 136/64   Pulse: 78   Resp: 18   Temp: 97.8 °F (36.6 °C)   SpO2: 98%      Body mass index is 28.73 kg/m².     Physical Exam:  GEN: NAD, conversant  EYES: EOMI, PERRL, no conjunctival erythema  NECK: no thyromegaly, full ROM   CV: RRR, no murmurs/rubs/gallops, no peripheral edema  LUNG: CTAB, no wheezes/rales/ronchi  SKIN: no rashes, no acanthosis  MSK: no deformities, full ROM of all extremities  NEURO: no tremors, DTR normal  PSYCH: AOX3, appropriate mood, affect normal      Results Review:     I reviewed the patient's new clinical results.    Lab Results   Component Value Date    GLUCOSE 388 (H) 08/03/2021    BUN 15 08/03/2021    CREATININE 1.04 08/03/2021    EGFRIFNONA 71 08/03/2021    BCR 14.4 08/03/2021    K 4.4 08/03/2021    CO2 21.0 (L) 08/03/2021    CALCIUM 8.8 08/03/2021    ALBUMIN 3.90 08/03/2021    AST 30 08/03/2021    ALT 41 08/03/2021       Lab Results   Component Value Date    HGBA1C 13.0 (H) 08/02/2021     Lab Results   Component Value Date    CREATININE 1.04 08/03/2021     Results from last 7 days   Lab Units 08/03/21  1227 08/03/21  0934 08/03/21  0720 08/02/21  2119 08/02/21  2009 08/02/21  1846   GLUCOSE mg/dL 443* 412* 347* 284* 301* 345*       Medication Review: Reviewed.       Current Facility-Administered Medications:   •  acetaminophen (TYLENOL) tablet 650 mg, 650 mg, Oral, Q6H PRN, Atkins, Emily D, APRN  •  amLODIPine (NORVASC) 5 mg, lisinopril (PRINIVIL,ZESTRIL) 20 mg, , Oral, Q24H, Atkins, Emily D, APRN, Given at 08/03/21 0957  •  aspirin EC tablet 81 mg, 81 mg, Oral, Daily, Emily Rolon APRN, 81 mg at 08/03/21 0957  •  atorvastatin (LIPITOR) tablet 40 mg, 40 mg, Oral, Daily, Emily Rolon APRN, 40 mg at 08/03/21 0957  •  calcium carbonate (TUMS)  chewable tablet 500 mg (200 mg elemental), 2 tablet, Oral, TID PRN, Emily Rolon, APRN  •  cetirizine (zyrTEC) tablet 10 mg, 10 mg, Oral, Daily, AtEmily coy, APRN, 10 mg at 08/03/21 0957  •  dextrose (D50W) 25 g/ 50mL Intravenous Solution 25 g, 25 g, Intravenous, Q15 Min PRN, Ryan Oliver MD  •  dextrose (GLUTOSE) oral gel 15 g, 15 g, Oral, Q15 Min PRN, Ryan Oliver MD  •  enoxaparin (LOVENOX) syringe 40 mg, 40 mg, Subcutaneous, Daily, AtEmily coy, APRN  •  glucagon (human recombinant) (GLUCAGEN DIAGNOSTIC) injection 1 mg, 1 mg, Subcutaneous, Q15 Min PRN, Ryan Oliver MD  •  hydrALAZINE (APRESOLINE) injection 20 mg, 20 mg, Intravenous, Q6H PRN, Emily Rolon, APRN  •  HYDROmorphone (DILAUDID) injection 0.5 mg, 0.5 mg, Intravenous, Q2H PRN, Emily Rolon, APRN  •  insulin glargine (LANTUS, SEMGLEE) injection 10 Units, 10 Units, Subcutaneous, Daily With Breakfast, AtEmily coy, APRN, 10 Units at 08/03/21 0957  •  insulin lispro (ADMELOG) injection 0-14 Units, 0-14 Units, Subcutaneous, TID AC, 14 Units at 08/03/21 1247 **AND** insulin lispro (ADMELOG) injection 0-14 Units, 0-14 Units, Subcutaneous, PRN, Ryan Oliver MD  •  Magnesium Sulfate 2 gram infusion - Mg less than or equal to 1.5 mg/dL, 2 g, Intravenous, PRN **OR** Magnesium Sulfate 1 gram infusion - Mg 1.6-1.9 mg/dL, 1 g, Intravenous, PRN, Emily Rolon, APRN  •  melatonin tablet 5 mg, 5 mg, Oral, Nightly PRN, Emily Rolon, APRN  •  metoprolol succinate XL (TOPROL-XL) 24 hr tablet 50 mg, 50 mg, Oral, Daily, Emily Rolon, APRN, 50 mg at 08/03/21 0957  •  multivitamin (THERAGRAN) tablet 1 tablet, 1 tablet, Oral, Daily, Emily Rolon, APRN, 1 tablet at 08/03/21 0957  •  nitroglycerin (NITROSTAT) SL tablet 0.4 mg, 0.4 mg, Sublingual, Q5 Min PRN, Emily Rolon, APRN  •  ondansetron (ZOFRAN) injection 4 mg, 4 mg, Intravenous, Q6H PRN, Emily Rolon, APRN  •  pantoprazole (PROTONIX) EC tablet 40  mg, 40 mg, Oral, QAM AC, Atkins, Emily D, APRN, 40 mg at 08/03/21 0957  •  potassium chloride (K-DUR,KLOR-CON) CR tablet 40 mEq, 40 mEq, Oral, PRN **OR** potassium chloride (KLOR-CON) packet 40 mEq, 40 mEq, Oral, PRN **OR** potassium chloride 10 mEq in 100 mL IVPB, 10 mEq, Intravenous, Q1H PRN, Atkins, Emily D, APRN  •  potassium chloride (K-DUR,KLOR-CON) ER tablet 10 mEq, 10 mEq, Oral, Daily, Atkins, Emily D, APRN, 10 mEq at 08/03/21 0957  •  saccharomyces boulardii (FLORASTOR) capsule 250 mg, 250 mg, Oral, Daily, Atkins, Emily D, APRN, 250 mg at 08/03/21 0957  •  sodium chloride 0.9 % flush 10 mL, 10 mL, Intravenous, PRN, Alex, Mari Frankel PA, 10 mL at 08/03/21 0541  •  sodium chloride 0.9 % infusion, 150 mL/hr, Intravenous, Continuous, Atkins, Emily D, APRN, Last Rate: 150 mL/hr at 08/03/21 1005, 150 mL/hr at 08/03/21 1005  •  vitamin B-12 (CYANOCOBALAMIN) tablet 500 mcg, 500 mcg, Oral, Daily, Atkins, Emily D, APRN, 500 mcg at 08/03/21 0957    Assessment/Plan   1.  Diabetes mellitus type 2: Uncontrolled with significant hyperglycemia.  We will add basal bolus insulin therapy with Lantus and Humalog along with Humalog sliding scale.  We will get diabetes educators to see him for insulin training.  He will need follow-up in the office post discharge.  I explained this to him and his wife.  2.  Hypertension: Well-controlled, continue current medication  3.  Hyperlipidemia: Currently on atorvastatin  4.  CAD: Being followed by cardiology.    Thank you very much for the consultation.             Bryce Anderson MD FACE.

## 2021-08-03 NOTE — CASE MANAGEMENT/SOCIAL WORK
Discharge Planning Assessment   [facility]     Patient Name: Tom Steele  MRN: [id number]  Today's Date: 8/3/2021    Admit Date: 8/2/2021           Living Environment    Lives With  spouse    Current Living Arrangements  home/apartment/condo    Primary Care Provided by  self    Provides Primary Care For  no one    Family Caregiver if Needed  spouse    Quality of Family Relationships  supportive    Able to Return to Prior Arrangements  yes       Transition Planning    Patient/Family Anticipates Transition to  home with family    Patient/Family Anticipated Services at Transition  none    Transportation Anticipated  family or friend will provide       Discharge Needs Assessment    Equipment Currently Used at Home  cpap        Discharge Plan     Row Name 08/03/21 1249       Plan    Plan  anticiipate return home    Patient/Family in Agreement with Plan  yes    Plan Comments  spoke to patient and spouse in room with ppe(goggles and mask) staying 6 feet away and less than 15 mins; he gets medication from [facility] on [address] [address]; barrier is pending stress test         General Information    Admission Type  observation    Arrived From  emergency department    Preferred Language  English     Used During This Interaction  no         Functional Status    Usual Activity Tolerance  good    Current Activity Tolerance  good       Functional Status, IADL    Medications  independent       Carol naegele rn  Case management  Office number [phone]  Cell phone [phone]

## 2021-08-03 NOTE — PLAN OF CARE
Goal Outcome Evaluation:              Outcome Summary: Pt admitted from ER for chest pain and hyperglycemia. Pt alert and oriented x 4. Wife at bedside during admission. Pt reports he has been feeling bad for the last 3 weeks. Pt denies any chest pain but is having fatigue. Pt to be NPO for stress test today and called  for endocrinology consult.

## 2021-08-03 NOTE — CONSULTS
Cardiology Consult Note      REQUESTING PHYSICIAN    Marlys Stallings MD    PATIENT IDENTIFICATION  Name: Tom Steele  Age: 70 y.o.  Sex: male  :  1950  MRN: [id number]               Cardiology assessment and plan    Shortness of breath and dyspnea on exertion  Abdominal pain and epigastric pain  Prior history of known coronary artery disease prior PCI and stenting  Abnormal stress test suggestive of prior myocardial infarction with no significant reversible ischemia  Hypertension  Hyperlipidemia  Newly diagnosed uncontrolled diabetes with hyperosmolar state    No evidence of any acute coronary syndrome  Results of the labs and a stress test reviewed and discussed with the patient      STRESS ECHOCARDIOGRAM 5/15/19, which was a normal stress echo with good apical views. Peristernal views were limited. Only mild age related valvular changes. No concerning regurgitation. His treadmill time was 8:06 minutes on Sim protocol. He achieved 99% PMHR at 150 bpm. No chest pain or pressure.    He underwent cardiac catheterization in 2012 by Dr. Rei Plasencia. He had a drug eluting 2.25 mm coronary stent to the proximal circumflex/mid circumflex. Had 40-50% left main disease at that point. Mild-moderate LAD disease.    He also underwent left heart cath on 11 by Dr. Swift. 40% disease in distal LM, 40% and ostium of circumflex, 40% ostial obtuse marginal with 30-40% narrowing with luminal irregularities. 100% occlusion in circumflex. 40% narrowing proximal LAD. 30-40% in diagonal, followed by 30% in the rest of the LAD. RCA with multiple luminal irregularities and some areas of 30-40% proximal disease.     Discussed with the patient about possibly considering further invasive work-up for further evaluation and treatment options for abnormal stress test  Patient prefers to try medical therapy and follow-up with his primary cardiologist as an outpatient  Risk benefits and alternatives  "reviewed and discussed with the patient  Recommend aspirin statin and beta-blockers from cardiac standpoint  Sublingual as needed nitroglycerin for any chest discomfort  Need for close monitoring and follow-up reviewed and discussed with patient  Risk benefits and alternatives reviewed and discussed with patient and family        REASON FOR CONSULTATION:  70-year-old male with known history of coronary artery disease.  Additional history of essential hypertension, obesity, obstructive sleep apnea compliant with CPAP, dyslipidemia, diabetes mellitus 2, history of cervical spine fusion.    Primary cardiologist-Dr. Art      Heart catheterization December 2012: DIANA to the proximal circumflex/mid circumflex.  40-50% left main disease at that point.  Mild-mild LAD disease.      Stress echocardiogram 5/15/2019: Only mild age-related valvular changes.  No concerning regurgitation.  Treadmill time was 8: 06 per Sim protocol.  He achieved 99% PMHR.      CC:  Abnormal nuclear stress test    HISTORY OF PRESENT ILLNESS:   Patient presented to [facility] 8/2/2021 with complaint of generalized fatigue, abdominal discomfort, elevated blood sugar and \"just feeling off\".  Symptoms off and on for the past month.  He reported diffuse abdominal discomfort that began in lower abdomen and progressively migrating upward into his lower chest. He reports nausea, weakness and dizziness.  He denies any vomiting, diarrhea, abnormal bleeding. Patient stated he has chronic left shoulder pain. Patient denies any shortness of breath, orthopnea, PND, lower extremity edema or diaphoresis.  He ruled out for acute coronary syndrome with negative serial cardiac enzymes and no acute ST segment changes on EKG.  Upon my evaluation, patient is undergoing nuclear stress testing.  He denies any chest discomfort at onset, but did experience moderate chest pressure during pharmacological portion.  Patient had approximately 0.5 mm ST depression in " "lead II.  1 mm ST depression and occasional PVCs during Lexiscan infusion that resolved in recovery.        REVIEW OF SYSTEMS:  Pertinent items are noted in HPI, all other systems reviewed and negative    OBJECTIVE   Nuclear stress testing with large perfusion defect that is severe in intensity in the inferior/inferolateral wall suggestive of prior myocardial injury with no significant tere-infarct ischemia.  EF 40%.  High risk study      ASSESSMENT  Abnormal nuclear stress testing  Known history of coronary artery disease with prior PCI  Fatigue/weakness  Abdominal discomfort/nausea  Hypertension  Dyslipidemia  Diabetes mellitus 2  Sleep apnea    PLAN  Results of stress testing reviewed with the patient and wife at bedside. Patient is seen routinely by his primary cardiologist.  Dr. Oscar to further evaluate patient and decide plan of care        Vital Signs  Visit Vitals  /80   Pulse 76   Temp 97.5 °F (36.4 °C) (Oral)   Resp 18   Ht 182.9 cm (72\")   Wt 96.1 kg (211 lb 13.8 oz)   SpO2 96%   BMI 28.73 kg/m²     Oxygen Therapy  SpO2: 96 %  Device (Oxygen Therapy): room air  Flow (L/min): 2  Flowsheet Rows      First Filed Value   Admission Height  182.9 cm (72\") Documented at 08/02/2021 1411   Admission Weight  96.1 kg (211 lb 13.8 oz) Documented at 08/02/2021 1411        Intake & Output (last 3 days)       07/31 0701 - 08/01 0700 08/01 0701 - 08/02 0700 08/02 0701 - 08/03 0700 08/03 0701 - 08/04 0700    P.O.   480     IV Piggyback   1500     Total Intake(mL/kg)   1980 (20.6)     Net   +1980                 Lines, Drains & Airways    Active LDAs     Name:   Placement date:   Placement time:   Site:   Days:    Peripheral IV 08/02/21 1503 Right Antecubital   08/02/21    1503    Antecubital   less than 1                MEDICAL HISTORY    Past Medical History:   Diagnosis Date   • Allergic rhinitis    • Degenerative joint disease    • FH: cholecystectomy    • Hypercholesteremia    • Hypertension    • NEREIDA " "(obstructive sleep apnea)         SURGICAL HISTORY    Past Surgical History:   Procedure Laterality Date   • BACK SURGERY          FAMILY HISTORY    History reviewed. No pertinent family history.    SOCIAL HISTORY    Social History     Tobacco Use   • Smoking status: Never Smoker   • Smokeless tobacco: Never Used   Substance Use Topics   • Alcohol use: Not Currently        ALLERGIES    No Known Allergies           /80   Pulse 76   Temp 97.5 °F (36.4 °C) (Oral)   Resp 18   Ht 182.9 cm (72\")   Wt 96.1 kg (211 lb 13.8 oz)   SpO2 96%   BMI 28.73 kg/m²   Intake/Output last 3 shifts:  I/O last 3 completed shifts:  In: 1980 [P.O.:480; IV Piggyback:1500]  Out: -   Intake/Output this shift:  No intake/output data recorded.    PHYSICAL EXAM:    General: Alert, cooperative, no distress, appears stated age  Head:  Normocephalic, atraumatic, mucous membranes moist  Eyes:  Conjunctiva/corneas clear, EOM's intact     Neck:  Supple,  no adenopathy; no JVD or bruit  Lungs: Clear to auscultation bilaterally, no wheezes rhonchi rales are noted  Chest wall: No tenderness  Heart::  Regular rate and rhythm, S1 and S2 normal, no murmur, rub or gallop  Abdomen: Soft, non-tender, nondistended bowel sounds active  Extremities: No cyanosis, clubbing, or edema   Pulses: 2+ and symmetric all extremities  Skin:  No rashes or lesions  Neuro/psych: A&O x3. CN II through XII are grossly intact with appropriate affect      Scheduled Meds:      amLODIPine-lisinopril 5-20 mg combo dose, , Oral, Q24H  aspirin, 81 mg, Oral, Daily  atorvastatin, 40 mg, Oral, Daily  cetirizine, 10 mg, Oral, Daily  enoxaparin, 40 mg, Subcutaneous, Daily  insulin glargine, 10 Units, Subcutaneous, Daily With Breakfast  insulin lispro, 0-14 Units, Subcutaneous, TID AC  metoprolol succinate XL, 50 mg, Oral, Daily  multivitamin, 1 tablet, Oral, Daily  pantoprazole, 40 mg, Oral, QAM AC  potassium chloride, 10 mEq, Oral, Daily  saccharomyces boulardii, 250 mg, Oral, " Daily  vitamin B-12, 500 mcg, Oral, Daily        Continuous Infusions:    sodium chloride, 150 mL/hr, Last Rate: 150 mL/hr (08/03/21 1005)        PRN Meds:    •  acetaminophen  •  calcium carbonate  •  dextrose  •  dextrose  •  glucagon (human recombinant)  •  hydrALAZINE  •  HYDROmorphone  •  insulin lispro **AND** insulin lispro  •  magnesium sulfate **OR** magnesium sulfate in D5W 1g/100mL (PREMIX)  •  melatonin  •  nitroglycerin  •  ondansetron  •  potassium chloride **OR** potassium chloride **OR** potassium chloride  •  sodium chloride        Results Review:     I reviewed the patient's new clinical results.    CBC    Results from last 7 days   Lab Units 08/03/21  0611 08/02/21  1503   WBC 10*3/mm3 7.60 8.00   HEMOGLOBIN g/dL 13.8 14.6   PLATELETS 10*3/mm3 259 286     Cr Clearance Estimated Creatinine Clearance: 79.5 mL/min (by C-G formula based on SCr of 1.04 mg/dL).  Coag   Results from last 7 days   Lab Units 08/02/21  1503   INR  1.00     HbA1C   Lab Results   Component Value Date    HGBA1C 13.0 (H) 08/02/2021     Blood Glucose   Glucose   Date/Time Value Ref Range Status   08/03/2021 0934 412 (C) 70 - 105 mg/dL Final     Comment:     Serial Number: [id number]:  [id number]   08/03/2021 0720 347 (H) 70 - 105 mg/dL Final     Comment:     Serial Number: [id number]:  [id number]   08/02/2021 2119 284 (H) 70 - 105 mg/dL Final     Comment:     Serial Number: [id number]:  [id number]   08/02/2021 2009 301 (H) 70 - 105 mg/dL Final     Comment:     Serial Number: [id number]:  [id number]   08/02/2021 1846 345 (H) 70 - 105 mg/dL Final     Comment:     Serial Number: [id number]:  [id number]   08/02/2021 1609 443 (H) 74 - 100 mg/dL Final     Comment:     Serial Number: [id number]:  [id number]   08/02/2021 1434 458 (C) 70 - 105 mg/dL Final     Comment:     Serial Number: [id number]:  [id number]   08/02/2021 1414 471 (C) 70 - 105 mg/dL Final     Comment:     Serial Number:  [id number]:  [id number]     Infection     CMP   Results from last 7 days   Lab Units 08/03/21  0611 08/02/21  1503   SODIUM mmol/L 135* 128*   POTASSIUM mmol/L 4.4 4.5   CHLORIDE mmol/L 97* 88*   CO2 mmol/L 21.0* 18.0*   BUN mg/dL 15 18   CREATININE mg/dL 1.04 1.11   GLUCOSE mg/dL 388* 463*   ALBUMIN g/dL 3.90 4.00   BILIRUBIN mg/dL 0.4 0.6   ALK PHOS U/L 106 117   AST (SGOT) U/L 30 33   ALT (SGPT) U/L 41 46*   LIPASE U/L  --  76*     ABG      UA    Results from last 7 days   Lab Units 08/02/21  1509   NITRITE UA  Negative     FELY  No results found for: POCMETH, POCAMPHET, POCBARBITUR, POCBENZO, POCCOCAINE, POCOPIATES, POCOXYCODO, POCPHENCYC, POCPROPOXY, POCTHC, POCTRICYC  Lysis Labs   Results from last 7 days   Lab Units 08/03/21  0611 08/02/21  1503   INR   --  1.00   HEMOGLOBIN g/dL 13.8 14.6   PLATELETS 10*3/mm3 259 286   CREATININE mg/dL 1.04 1.11     Radiology(recent) XR Chest 1 View    Result Date: 8/2/2021  No acute chest finding.  Electronically Signed By-Latisha Wells MD On:8/2/2021 3:00 PM This report was finalized on [id number] by  Latisha Wells MD.        Results from last 7 days   Lab Units 08/03/21  0611 08/02/21  1610 08/02/21  1503   CK TOTAL U/L  --   --  55   TROPONIN T ng/mL <0.010   < > <0.010    < > = values in this interval not displayed.       Xrays, labs reviewed personally by physician.    ECG/EMG Results (most recent)     Procedure Component Value Units Date/Time    ECG 12 Lead [[id number]] Collected: 08/02/21 1445     Updated: 08/02/21 1447     QT Interval 313 ms     Narrative:      HEART RATE= 80  bpm  RR Interval= 752  ms  OK Interval= 176  ms  P Horizontal Axis= 10  deg  P Front Axis= 30  deg  QRSD Interval= 88  ms  QT Interval= 313  ms  QRS Axis= -14  deg  T Wave Axis= 153  deg  - ABNORMAL ECG -  Sinus rhythm  Probable anteroseptal infarct, old  When compared with ECG of 28-May-2014 14:59:57,  Significant rate increase  Electronically Signed By:   Date and Time of Study:  2021-08-02 14:45:43    ECG 12 Lead [[id number]] Resulted: 08/02/21 1937     Updated: 08/02/21 1937            Medication Review:   I have reviewed the patient's current medication list  Scheduled Meds:amLODIPine-lisinopril 5-20 mg combo dose, , Oral, Q24H  aspirin, 81 mg, Oral, Daily  atorvastatin, 40 mg, Oral, Daily  cetirizine, 10 mg, Oral, Daily  enoxaparin, 40 mg, Subcutaneous, Daily  insulin glargine, 10 Units, Subcutaneous, Daily With Breakfast  insulin lispro, 0-14 Units, Subcutaneous, TID AC  metoprolol succinate XL, 50 mg, Oral, Daily  multivitamin, 1 tablet, Oral, Daily  pantoprazole, 40 mg, Oral, QAM AC  potassium chloride, 10 mEq, Oral, Daily  saccharomyces boulardii, 250 mg, Oral, Daily  vitamin B-12, 500 mcg, Oral, Daily      Continuous Infusions:sodium chloride, 150 mL/hr, Last Rate: 150 mL/hr (08/03/21 1005)      PRN Meds:.•  acetaminophen  •  calcium carbonate  •  dextrose  •  dextrose  •  glucagon (human recombinant)  •  hydrALAZINE  •  HYDROmorphone  •  insulin lispro **AND** insulin lispro  •  magnesium sulfate **OR** magnesium sulfate in D5W 1g/100mL (PREMIX)  •  melatonin  •  nitroglycerin  •  ondansetron  •  potassium chloride **OR** potassium chloride **OR** potassium chloride  •  sodium chloride    Imaging:  Imaging Results (Last 72 Hours)     Procedure Component Value Units Date/Time    XR Chest 1 View [[id number]] Collected: 08/02/21 1459     Updated: 08/02/21 1503    Narrative:      DATE OF EXAM:  8/2/2021 2:50 PM     PROCEDURE:  XR CHEST 1 VW-     INDICATIONS:  Chest pain protocol       COMPARISON:  None     TECHNIQUE:   Single radiographic view of the chest was obtained.     FINDINGS:  No acute airspace disease. Normal heart size. No pleural effusion or  pneumothorax. There are surgical changes in the cervical spine. No acute  osseous abnormalities.       Impression:      No acute chest finding.     Electronically Signed By-Latisha Wells MD On:8/2/2021 3:00 PM  This report was finalized  "on [id number] by  Latisha Wells MD.            TOÑITO Monsivais  08/03/21  10:56 EDT       EMR Dragon/Transcription:   \"Dictated utilizing Dragon dictation\".                 Electronically signed by TOÑITO Monsivais, 08/03/21, 10:56 AM EDT.    "

## 2021-08-03 NOTE — H&P
"    Patient Care Team:  Nellie Adrian MD as PCP - General (Family Medicine)    Chief complaint Hyperglycemia    Subjective     Patient is a 70 y.o. male with history of CAD, hypertension, hyperlipidemia presents to the ER last aicha with complaints of generalized fatigue and hyperglycemia today.  Patient states that he had outpatient CT abdomen pelvis done to evaluate his pancreas due to abnormal blood work, states that they took his blood sugar at the facility found to be elevated and was told to come to the ER.  Patient denies history of diabetes, does report family history of diabetes.  Patient reports that he has been feeling \"off\" for the last 4 weeks.  He reports diffuse abdominal pain, nausea, weakness and dizziness.  He denies any diarrhea vomiting, hematochezia or melena.  Patient also reports midsternal chest pain that occasionally radiates to his left shoulder.  He describes the chest pain as a achy pain that he rates a 5/10, intermittent.  Patient also reports feelings of polydipsia, polyuria.  He denies any numbness or tingling in his arms or legs.  He denies any falls, head injury or syncope.  He denies any abnormality in his gait or memory issues. He was admitted with severely elevated BS, CP, and dehydration. This morning he cont to have some chest \"heaviness\" on lower R side as well as epigastric pain. Denies HA, SOA, N/V, diarrhea today but has had some this past week.   Review of Systems   Pertinent items are noted in HPI, all other systems reviewed and negative    History  Past Medical History:   Diagnosis Date   • Allergic rhinitis    • Degenerative joint disease    • FH: cholecystectomy    • Hypercholesteremia    • Hypertension    • NEREIDA (obstructive sleep apnea)      Past Surgical History:   Procedure Laterality Date   • BACK SURGERY       History reviewed. No pertinent family history.  Social History     Tobacco Use   • Smoking status: Never Smoker   • Smokeless tobacco: Never Used   Vaping " Use   • Vaping Use: Never used   Substance Use Topics   • Alcohol use: Not Currently   • Drug use: Never     Allergies:  Patient has no known allergies.    Objective     Vital Signs  Temp:  [97.5 °F (36.4 °C)-97.7 °F (36.5 °C)] 97.5 °F (36.4 °C)  Heart Rate:  [] 79  Resp:  [18-26] 18  BP: (116-169)/(59-96) 166/67      Physical Exam:      General Appearance:    Alert, cooperative, in no acute distress   Head:    Normocephalic, without obvious abnormality, atraumatic   Eyes:            Lids and lashes normal, conjunctivae and sclerae normal, no   icterus, no pallor   Ears:    Ears appear intact with no abnormalities noted   Throat:   No oral lesions, no thrush, oral mucosa moist   Neck:   No adenopathy, supple, trachea midline, no thyromegaly   Back:     No kyphosis present, no scoliosis present, no skin lesions,      erythema or scars, no tenderness to percussion or                   palpation,   range of motion normal   Lungs:     Clear to auscultation,respirations regular, even and                  unlabored    Heart:    Regular rhythm and normal rate, normal S1 and S2, no            murmur, no gallop, no rub, no click   Chest Wall:    No abnormalities observed   Abdomen:     Normal bowel sounds, no masses, no organomegaly, soft        Mild mod RUQ and epigastric tenderness, non-distended, no guarding,   Rectal:     Deferred   Extremities:   Moves all extremities well, no edema, no cyanosis, no             redness   Pulses:   Pulses palpable and equal bilaterally   Skin:   No bleeding, bruising or rash   Lymph nodes:   No palpable adenopathy   Neurologic:   Cranial nerves 2 - 12 grossly intact, sensation intact       Results Review:    I reviewed the patient's new clinical results.  I reviewed the patient's new imaging results and agree with the interpretation.    Assessment/Plan     1. Type 2 DM with hyperglycemia-Pt has been started on Lantus and SS insulin and we have consulted ENDO-cont to monitor BS  closely  2. Chest Pain-Hx of CAD with stent-sees Dr. Art-Negative Troponins-Stress Myoview this AM, with consult cardiology if abnormal. Nitro PRN  3. Metabolic acidoses- No DKA on admission-Mild-recheck ABGs this morning, may need Sodium Bicarb  4, Hyponatremia-due to severely elevated BS, untreated DM and dehydration-will give fluid bolus and then cont fluids  5. CHITO-mild-due to above, should improve with Tx and fluids  6. CAD-Cont home meds  7. Htn-Cont home meds. Hydralazine PRN  8. ABd pain/Mild pancreatitis-pancreatic rest-cl. liqs only after Myoview-most likely due to untreated DM. IV pain/nausea meds  9. Weakness/Dizziness-already improving and should cont to do so with Tx    Expected Length of Stay 2-3 days    I discussed the patients findings and my recommendations with patient and nursing staff.     Emily Rolon, APRN  08/03/21  07:18 EDT

## 2021-08-03 NOTE — CONSULTS
"Diabetes Education  Assessment/Teaching    Patient Name:  Tom Steele  YOB: 1950  MRN: [id number]  Admit Date:  8/2/2021      Assessment Date:  8/3/2021    Most Recent Value   General Information    Referral From:  A1c, Blood glucose, MD order [Consult received due to new dx of diabetes. Educator to teach insulin administration. A1c this adm 13.0%. Adm bs 471.]   Height  182.9 cm (72\")   Weight  96.1 kg (211 lb 13.8 oz)   Pregnancy Assessment   Diabetes History   What type of diabetes do you have?  Type 2   Education Preferences   What areas of diabetes would you like to learn about?  avoiding high blood sugar, avoiding low blood sugar, exercise information, medications for diabetes, understanding diabetes, testing my blood sugar at home   Nutrition Information   Assessment Topics   Healthy Eating - Assessment  Needs education   Being Active - Assessment  Needs education   Taking Medication - Assessment  Needs education   Problem Solving - Assessment  Needs education   Reducing Risk - Assessment  Needs education   Monitoring - Assessment  Needs education   DM Goals   Healthy Eating - Goal  -- [Meal plan education to be covered on different day.]   Being Active - Goal  Today   Taking Medication - Goal  Today   Problem Solving - Goal  Today   Reducing Risk - Goal  Today   Monitoring - Goal  Today            Most Recent Value   DM Education Needs   Meter  Meter provided [Gave Accuchek Guide Me. Instructed pt in use of meter. He performed return demo correctly. BS at bedside 400 mg/dl. Pt just injected presupper mealtime insulin and sliding scale of 22 units admelog]   Meter Type  Accuchek   Frequency of Testing  3 times a day [Discussed importance of checking bs 3 times/day and to rotate the times when he checks (ac and hs). Gave log book to record bs.]   Blood Glucose Target Range  Discussed A1c result of 13.0%. Reviewed healthy bs range and healthy A1c target. Discussed importance of bs control. " Discussed diagnostic criteria for diabetes.   Medication  -- [Pt has been started on Lantus 30 units am and Admelog 10 units ac plus sliding scale.]   Problem Solving  Hypoglycemia, Hyperglycemia, Signs, Symptoms, Treatment [Discussed importance of always carrying low bs tx.]   Reducing Risks  A1C testing [Gave A1c info sheet]   Healthy Eating  -- [Pt requesting meal plan education be deferred until tomorrow.]   Physical Activity  None [Discussed importance of routine exercise in helping with bs control.]   Motivation  Engaged   Teaching Method  Explanation, Discussion, Demonstration, Handouts   Patient Response  Verbalized understanding, Demonstrates adequately            Other Comments:  Education completed at bedside with wife present. Instructed pt/wife in use of insulin pen. Both were able to perform return demo correctly. Reviewed injection sites, rotation of sites, storage of insulin pens and disposal of pen needles. Discussed when to take long-acting insulin and mealtime insulin. Discussed not taking mealtime insulin if not eating a meal. Pt states he eats breakfast at 9 am and supper between 4-7 pm. He snacks in afternoon. Pt does not drink sugared beverages. He drinks water, powerade zero or diet coke. He drinks 1 cup of milk daily. Dr. Bryce Anderson seeing pt in hospital. Gave First Steps booklet, Planning Healthy Meals packet, Insulin administration handouts and sample pen needles. Rxs started for Accuchek Guide test strips, Accuchek Softclix lancets, lantus pen, Admelog pen and pen needles. Pt/wife with no additional questions. Educator will attempt follow up on different day for meal plan education.       Electronically signed by:  Kirstin White RN  08/03/21 18:35 EDT

## 2021-08-03 NOTE — PLAN OF CARE
Goal Outcome Evaluation:           Progress: no change  Outcome Summary: Patient has had no complaints of pain or discomfort. Patient still hyperglycemic. Myoview this morning. Cardiology consulted and plans for medical management. Will continue to monitor.

## 2021-08-04 VITALS
BODY MASS INDEX: 29.59 KG/M2 | WEIGHT: 218.5 LBS | TEMPERATURE: 98.9 F | HEIGHT: 72 IN | OXYGEN SATURATION: 97 % | SYSTOLIC BLOOD PRESSURE: 168 MMHG | RESPIRATION RATE: 18 BRPM | DIASTOLIC BLOOD PRESSURE: 76 MMHG | HEART RATE: 82 BPM

## 2021-08-04 LAB
GLUCOSE BLDC GLUCOMTR-MCNC: 321 MG/DL (ref 70–105)
GLUCOSE BLDC GLUCOMTR-MCNC: 366 MG/DL (ref 70–105)

## 2021-08-04 PROCEDURE — 82962 GLUCOSE BLOOD TEST: CPT

## 2021-08-04 PROCEDURE — 63710000001 INSULIN LISPRO (HUMAN) PER 5 UNITS: Performed by: INTERNAL MEDICINE

## 2021-08-04 PROCEDURE — 96361 HYDRATE IV INFUSION ADD-ON: CPT

## 2021-08-04 PROCEDURE — 63710000001 INSULIN GLARGINE PER 5 UNITS: Performed by: INTERNAL MEDICINE

## 2021-08-04 PROCEDURE — 99214 OFFICE O/P EST MOD 30 MIN: CPT | Performed by: INTERNAL MEDICINE

## 2021-08-04 PROCEDURE — G0378 HOSPITAL OBSERVATION PER HR: HCPCS

## 2021-08-04 PROCEDURE — G0108 DIAB MANAGE TRN  PER INDIV: HCPCS

## 2021-08-04 PROCEDURE — 63710000001 INSULIN LISPRO (HUMAN) PER 5 UNITS: Performed by: FAMILY MEDICINE

## 2021-08-04 RX ORDER — INSULIN GLARGINE 100 [IU]/ML
30 INJECTION, SOLUTION SUBCUTANEOUS
Qty: 15 ML | Refills: 6 | Status: SHIPPED | OUTPATIENT
Start: 2021-08-05 | End: 2021-08-20 | Stop reason: SDUPTHER

## 2021-08-04 RX ORDER — NITROGLYCERIN 0.4 MG/1
0.4 TABLET SUBLINGUAL
Qty: 25 TABLET | Refills: 12 | Status: SHIPPED | OUTPATIENT
Start: 2021-08-04

## 2021-08-04 RX ORDER — INSULIN LISPRO 100 [IU]/ML
10 INJECTION, SOLUTION INTRAVENOUS; SUBCUTANEOUS
Qty: 15 ML | Refills: 6 | Status: SHIPPED | OUTPATIENT
Start: 2021-08-04 | End: 2021-08-20 | Stop reason: SDUPTHER

## 2021-08-04 RX ADMIN — PANTOPRAZOLE SODIUM 40 MG: 40 TABLET, DELAYED RELEASE ORAL at 08:22

## 2021-08-04 RX ADMIN — INSULIN LISPRO 10 UNITS: 100 INJECTION, SOLUTION INTRAVENOUS; SUBCUTANEOUS at 08:24

## 2021-08-04 RX ADMIN — LISINOPRIL: 20 TABLET ORAL at 08:23

## 2021-08-04 RX ADMIN — CYANOCOBALAMIN TAB 250 MCG 500 MCG: 250 TAB at 08:23

## 2021-08-04 RX ADMIN — ATORVASTATIN CALCIUM 40 MG: 40 TABLET, FILM COATED ORAL at 08:23

## 2021-08-04 RX ADMIN — ASPIRIN 81 MG: 81 TABLET, COATED ORAL at 08:23

## 2021-08-04 RX ADMIN — METOPROLOL SUCCINATE 50 MG: 50 TABLET, EXTENDED RELEASE ORAL at 08:23

## 2021-08-04 RX ADMIN — SODIUM CHLORIDE 150 ML/HR: 9 INJECTION, SOLUTION INTRAVENOUS at 08:21

## 2021-08-04 RX ADMIN — POTASSIUM CHLORIDE 10 MEQ: 750 TABLET, EXTENDED RELEASE ORAL at 08:22

## 2021-08-04 RX ADMIN — Medication 250 MG: at 08:23

## 2021-08-04 RX ADMIN — THERA TABS 1 TABLET: TAB at 08:22

## 2021-08-04 RX ADMIN — INSULIN LISPRO 12 UNITS: 100 INJECTION, SOLUTION INTRAVENOUS; SUBCUTANEOUS at 11:46

## 2021-08-04 RX ADMIN — CETIRIZINE HYDROCHLORIDE 10 MG: 10 TABLET, FILM COATED ORAL at 08:22

## 2021-08-04 RX ADMIN — INSULIN LISPRO 10 UNITS: 100 INJECTION, SOLUTION INTRAVENOUS; SUBCUTANEOUS at 11:46

## 2021-08-04 RX ADMIN — INSULIN GLARGINE 30 UNITS: 100 INJECTION, SOLUTION SUBCUTANEOUS at 08:24

## 2021-08-04 RX ADMIN — INSULIN LISPRO 10 UNITS: 100 INJECTION, SOLUTION INTRAVENOUS; SUBCUTANEOUS at 08:23

## 2021-08-04 NOTE — CONSULTS
Diabetes Education    Patient Name:  Tom Steele  YOB: 1950  MRN: [id number]  Admit Date:  8/2/2021        Follow-up with patient to do go over meal planning. Wife at bedside and Healthy Meal Planning handout at the bedside. Discussed 1 serving of carbs being = 15 grams, being allowed 4 servings  of carbs per meal, counting carbs, reading food labels, and meal planning. Patient and wife stated that they have done Weight Watchers and the Keto diet in the past and have some understanding of carbs. Beckie pamphlet given with offering of classes. Patient and wife have no further questions or concerns related to diabetes at this time.      Electronically signed by:  Mari Mills RN  08/04/21 12:27 EDT

## 2021-08-04 NOTE — PROGRESS NOTES
Cardiology Progress  Note      Patient Care Team:  Nellie Adrian MD as PCP - General (Family Medicine)    PATIENT IDENTIFICATION  Name: Tom Steele  Age: 70 y.o.  Sex: male  :  1950  MRN: [id number]               Cardiology assessment and plan     Shortness of breath and dyspnea on exertion  Abdominal pain and epigastric pain  Prior history of known coronary artery disease prior PCI and stenting  Abnormal stress test suggestive of prior myocardial infarction with no significant reversible ischemia  Hypertension  Hyperlipidemia  Newly diagnosed uncontrolled diabetes with hyperosmolar state     No evidence of any acute coronary syndrome  Results of the labs and a stress test reviewed and discussed with the patient      Interpretation Summary    · Abnormal myocardial perfusion study  · Resting myocardial perfusion study shows a large perfusion defect that is severe in intensity involving the inferior and inferolateral wall that persists in the stress imaging suggestive of most likely prior myocardial infarction with no significant tere-infarct ischemia  · Gated images show hypokinesis involving the inferior and inferolateral wall with calculated LV ejection fraction of 40%  · Left ventricular ejection fraction is moderately reduced. (Calculated EF = 40%).       STRESS ECHOCARDIOGRAM 5/15/19, which was a normal stress echo with good apical views. Peristernal views were limited. Only mild age related valvular changes. No concerning regurgitation. His treadmill time was 8:06 minutes on Sim protocol. He achieved 99% PMHR at 150 bpm. No chest pain or pressure.    He underwent cardiac catheterization in 2012 by Dr. Rei Plasencia. He had a drug eluting 2.25 mm coronary stent to the proximal circumflex/mid circumflex. Had 40-50% left main disease at that point. Mild-moderate LAD disease.    He also underwent left heart cath on 11 by Dr. Swift. 40% disease in distal LM, 40%  and ostium of circumflex, 40% ostial obtuse marginal with 30-40% narrowing with luminal irregularities. 100% occlusion in circumflex. 40% narrowing proximal LAD. 30-40% in diagonal, followed by 30% in the rest of the LAD. RCA with multiple luminal irregularities and some areas of 30-40% proximal disease.      Discussed with the patient about possibly considering further invasive work-up for further evaluation and treatment options for abnormal stress test  Patient prefers to try medical therapy and follow-up with his primary cardiologist as an outpatient  Risk benefits and alternatives reviewed and discussed with the patient  Recommend aspirin statin and beta-blockers from cardiac standpoint  Sublingual as needed nitroglycerin for any chest discomfort  Need for close monitoring and follow-up reviewed and discussed with patient  Risk benefits and alternatives reviewed and discussed with patient and family                      REASON FOR FOLLOW-UP:  Chest discomfort  Abnormal nuclear stress testing  Known history of coronary artery disease with prior PCI        SUBJECTIVE    Patient seen and examined, chart and labs reviewed.  Patient resting comfortably in bed denies any complaints at present.  Has been up in room without difficulty.  Wife at bedside.  Blood sugar remains elevated at 321.      REVIEW OF SYSTEMS:  Pertinent items are noted in HPI, all other systems reviewed and negative    OBJECTIVE       ASSESSMENT  Shortness of breath and dyspnea on exertion  Abdominal pain and epigastric pain  Prior history of known coronary artery disease prior PCI and stenting  Abnormal stress test suggestive of prior myocardial infarction with no significant reversible ischemia  Hypertension  Hyperlipidemia  Newly diagnosed uncontrolled diabetes with hyperosmolar state    RECOMMENDATIONS  No evidence of acute coronary syndrome  Discussed with the patient about possibly considering further invasive work-up for further evaluation  "and treatment options for abnormal stress test  Patient prefers to discuss with his primary cardiologist prior to proceeding with any further work-up  Risk benefits and alternatives reviewed and discussed with the patient  Recommend aspirin statin and beta-blockers from cardiac standpoint  Sublingual as needed nitroglycerin for any chest discomfort  Need for close monitoring and follow-up reviewed and discussed with patient    Seen and examined patient agree with the note written by nurse practitioner    Vital Signs  Visit Vitals  /74   Pulse 75   Temp 97.7 °F (36.5 °C) (Oral)   Resp 14   Ht 182.9 cm (72\")   Wt 99.1 kg (218 lb 8 oz)   SpO2 95%   BMI 29.63 kg/m²     Oxygen Therapy  SpO2: 95 %  Pulse Oximetry Type: Intermittent  Device (Oxygen Therapy): room air  Flow (L/min): 2  Flowsheet Rows      First Filed Value   Admission Height  182.9 cm (72\") Documented at 08/02/2021 1411   Admission Weight  96.1 kg (211 lb 13.8 oz) Documented at 08/02/2021 1411        Intake & Output (last 3 days)       08/01 0701 - 08/02 0700 08/02 0701 - 08/03 0700 08/03 0701 - 08/04 0700 08/04 0701 - 08/05 0700    P.O.  480 500 0    I.V. (mL/kg)   975 (9.8)     IV Piggyback  1500      Total Intake(mL/kg)  1980 (20.6) 1475 (14.9) 0 (0)    Urine (mL/kg/hr)    550 (1.3)    Total Output    550    Net  +1980 +1475 -550            Urine Unmeasured Occurrence   4 x         Lines, Drains & Airways    Active LDAs     Name:   Placement date:   Placement time:   Site:   Days:    Peripheral IV 08/02/21 1503 Right Antecubital   08/02/21    1503    Antecubital   1                       /74   Pulse 75   Temp 97.7 °F (36.5 °C) (Oral)   Resp 14   Ht 182.9 cm (72\")   Wt 99.1 kg (218 lb 8 oz)   SpO2 95%   BMI 29.63 kg/m²   Intake/Output last 3 shifts:  I/O last 3 completed shifts:  In: 2455 [P.O.:980; I.V.:975; IV Piggyback:500]  Out: -   Intake/Output this shift:  I/O this shift:  In: 0   Out: 550 [Urine:550]    PHYSICAL " EXAM:    General: Alert, cooperative, no distress, appears stated age  Head:  Normocephalic, atraumatic, mucous membranes moist  Eyes:  Conjunctiva/corneas clear, EOM's intact     Neck:  Supple,  no adenopathy; no JVD or bruit  Lungs: Clear to auscultation bilaterally, no wheezes rhonchi rales are noted  Chest wall: No tenderness  Heart::  Regular rate and rhythm, S1 and S2 normal, no murmur, rub or gallop  Abdomen: Soft, non-tender, nondistended bowel sounds active  Extremities: No cyanosis, clubbing, or edema   Pulses: 2+ and symmetric all extremities  Skin:  No rashes or lesions  Neuro/psych: A&O x3. CN II through XII are grossly intact with appropriate affect      Scheduled Meds:      amLODIPine-lisinopril 5-20 mg combo dose, , Oral, Q24H  aspirin, 81 mg, Oral, Daily  atorvastatin, 40 mg, Oral, Daily  cetirizine, 10 mg, Oral, Daily  enoxaparin, 40 mg, Subcutaneous, Daily  insulin glargine, 30 Units, Subcutaneous, Daily With Breakfast  insulin lispro, 0-14 Units, Subcutaneous, TID AC  insulin lispro, 10 Units, Subcutaneous, TID With Meals  metoprolol succinate XL, 50 mg, Oral, Daily  multivitamin, 1 tablet, Oral, Daily  pantoprazole, 40 mg, Oral, QAM AC  potassium chloride, 10 mEq, Oral, Daily  saccharomyces boulardii, 250 mg, Oral, Daily  vitamin B-12, 500 mcg, Oral, Daily        Continuous Infusions:    sodium chloride, 150 mL/hr, Last Rate: 150 mL/hr (08/04/21 0821)        PRN Meds:    •  acetaminophen  •  calcium carbonate  •  dextrose  •  dextrose  •  glucagon (human recombinant)  •  hydrALAZINE  •  HYDROmorphone  •  magnesium sulfate **OR** magnesium sulfate in D5W 1g/100mL (PREMIX)  •  melatonin  •  nitroglycerin  •  ondansetron  •  potassium chloride **OR** potassium chloride **OR** potassium chloride  •  sodium chloride        Results Review:     I reviewed the patient's new clinical results.    CBC    Results from last 7 days   Lab Units 08/03/21  0611 08/02/21  1503   WBC 10*3/mm3 7.60 8.00    HEMOGLOBIN g/dL 13.8 14.6   PLATELETS 10*3/mm3 259 286     Cr Clearance Estimated Creatinine Clearance: 80.6 mL/min (by C-G formula based on SCr of 1.04 mg/dL).  Coag   Results from last 7 days   Lab Units 08/02/21  1503   INR  1.00     HbA1C   Lab Results   Component Value Date    HGBA1C 13.0 (H) 08/02/2021     Blood Glucose   Glucose   Date/Time Value Ref Range Status   08/04/2021 0712 321 (H) 70 - 105 mg/dL Final     Comment:     Serial Number: [id number]:  [id number]   08/03/2021 2120 339 (H) 70 - 105 mg/dL Final     Comment:     Serial Number: [id number]:  [id number]   08/03/2021 1718 356 (H) 70 - 105 mg/dL Final     Comment:     Serial Number: [id number]:  [id number]   08/03/2021 1522 305 (H) 70 - 105 mg/dL Final     Comment:     Serial Number: [id number]:  [id number]   08/03/2021 1227 443 (C) 70 - 105 mg/dL Final     Comment:     Serial Number: [id number]:  [id number]   08/03/2021 0934 412 (C) 70 - 105 mg/dL Final     Comment:     Serial Number: [id number]:  [id number]   08/03/2021 0720 347 (H) 70 - 105 mg/dL Final     Comment:     Serial Number: [id number]:  [id number]   08/02/2021 2119 284 (H) 70 - 105 mg/dL Final     Comment:     Serial Number: [id number]:  [id number]     Infection     CMP   Results from last 7 days   Lab Units 08/03/21  0611 08/02/21  1503   SODIUM mmol/L 135* 128*   POTASSIUM mmol/L 4.4 4.5   CHLORIDE mmol/L 97* 88*   CO2 mmol/L 21.0* 18.0*   BUN mg/dL 15 18   CREATININE mg/dL 1.04 1.11   GLUCOSE mg/dL 388* 463*   ALBUMIN g/dL 3.90 4.00   BILIRUBIN mg/dL 0.4 0.6   ALK PHOS U/L 106 117   AST (SGOT) U/L 30 33   ALT (SGPT) U/L 41 46*   LIPASE U/L  --  76*     ABG    Results from last 7 days   Lab Units 08/03/21  1132   PH, ARTERIAL pH units 7.380   PCO2, ARTERIAL mm Hg 29.6*   PO2 ART mm Hg 81.5*   O2 SATURATION ART % 95.9   BASE EXCESS ART mmol/L -6.4*     UA    Results from last 7 days   Lab Units 08/02/21  1509   NITRITE UA  Negative      FELY  No results found for: POCMETH, POCAMPHET, POCBARBITUR, POCBENZO, POCCOCAINE, POCOPIATES, POCOXYCODO, POCPHENCYC, POCPROPOXY, POCTHC, POCTRICYC  Lysis Labs   Results from last 7 days   Lab Units 08/03/21  0611 08/02/21  1503   INR   --  1.00   HEMOGLOBIN g/dL 13.8 14.6   PLATELETS 10*3/mm3 259 286   CREATININE mg/dL 1.04 1.11     Radiology(recent) XR Chest 1 View    Result Date: 8/2/2021  No acute chest finding.  Electronically Signed By-Latisha Wells MD On:8/2/2021 3:00 PM This report was finalized on [id number] by  Latisha Wells MD.        Results from last 7 days   Lab Units 08/03/21  0611 08/02/21  1610 08/02/21  1503   CK TOTAL U/L  --   --  55   TROPONIN T ng/mL <0.010   < > <0.010    < > = values in this interval not displayed.       Xrays, labs reviewed personally by physician.    ECG/EMG Results (most recent)     Procedure Component Value Units Date/Time    ECG 12 Lead [[id number]] Resulted: 08/02/21 1937     Updated: 08/02/21 1937    ECG 12 Lead [[id number]] Collected: 08/02/21 1445     Updated: 08/03/21 1329     QT Interval 313 ms     Narrative:      HEART RATE= 80  bpm  RR Interval= 752  ms  SC Interval= 176  ms  P Horizontal Axis= 10  deg  P Front Axis= 30  deg  QRSD Interval= 88  ms  QT Interval= 313  ms  QRS Axis= -14  deg  T Wave Axis= 153  deg  - ABNORMAL ECG -  Sinus rhythm  Probable anteroseptal infarct, old  When compared with ECG of 28-May-2014 14:59:57,  Significant rate increase  Electronically Signed By: Taye Olivo ([email or web address]) 03-Aug-2021 13:28:51  Date and Time of Study: 2021-08-02 14:45:43            Medication Review:   I have reviewed the patient's current medication list  Scheduled Meds:amLODIPine-lisinopril 5-20 mg combo dose, , Oral, Q24H  aspirin, 81 mg, Oral, Daily  atorvastatin, 40 mg, Oral, Daily  cetirizine, 10 mg, Oral, Daily  enoxaparin, 40 mg, Subcutaneous, Daily  insulin glargine, 30 Units, Subcutaneous, Daily With Breakfast  insulin lispro, 0-14 Units, Subcutaneous,  "TID AC  insulin lispro, 10 Units, Subcutaneous, TID With Meals  metoprolol succinate XL, 50 mg, Oral, Daily  multivitamin, 1 tablet, Oral, Daily  pantoprazole, 40 mg, Oral, QAM AC  potassium chloride, 10 mEq, Oral, Daily  saccharomyces boulardii, 250 mg, Oral, Daily  vitamin B-12, 500 mcg, Oral, Daily      Continuous Infusions:sodium chloride, 150 mL/hr, Last Rate: 150 mL/hr (08/04/21 0821)      PRN Meds:.•  acetaminophen  •  calcium carbonate  •  dextrose  •  dextrose  •  glucagon (human recombinant)  •  hydrALAZINE  •  HYDROmorphone  •  magnesium sulfate **OR** magnesium sulfate in D5W 1g/100mL (PREMIX)  •  melatonin  •  nitroglycerin  •  ondansetron  •  potassium chloride **OR** potassium chloride **OR** potassium chloride  •  sodium chloride    Imaging:  Imaging Results (Last 72 Hours)     Procedure Component Value Units Date/Time    XR Chest 1 View [[id number]] Collected: 08/02/21 1459     Updated: 08/02/21 1503    Narrative:      DATE OF EXAM:  8/2/2021 2:50 PM     PROCEDURE:  XR CHEST 1 VW-     INDICATIONS:  Chest pain protocol       COMPARISON:  None     TECHNIQUE:   Single radiographic view of the chest was obtained.     FINDINGS:  No acute airspace disease. Normal heart size. No pleural effusion or  pneumothorax. There are surgical changes in the cervical spine. No acute  osseous abnormalities.       Impression:      No acute chest finding.     Electronically Signed By-Latisha Wells MD On:8/2/2021 3:00 PM  This report was finalized on [id number] by  Latisha Wells MD.            TOÑITO Monsivais  08/04/21  11:08 EDT       EMR Dragon/Transcription:   \"Dictated utilizing Dragon dictation\".       Electronically signed by TOÑITO Monsivais, 08/04/21, 11:08 AM EDT.    "

## 2021-08-04 NOTE — PLAN OF CARE
Problem: Adult Inpatient Plan of Care  Goal: Absence of Hospital-Acquired Illness or Injury  Intervention: Identify and Manage Fall Risk  Recent Flowsheet Documentation  Taken 8/4/2021 0500 by Leobardo Dumont LPN  Safety Promotion/Fall Prevention:   safety round/check completed   room organization consistent   nonskid shoes/slippers when out of bed   muscle strengthening facilitated   mobility aid in reach   lighting adjusted   activity supervised   assistive device/personal items within reach   clutter free environment maintained   elopement precautions   fall prevention program maintained  Taken 8/4/2021 0310 by Leobardo Dumont LPN  Safety Promotion/Fall Prevention:   safety round/check completed   room organization consistent   nonskid shoes/slippers when out of bed   muscle strengthening facilitated   mobility aid in reach   lighting adjusted   fall prevention program maintained   clutter free environment maintained   activity supervised   assistive device/personal items within reach  Taken 8/4/2021 0102 by Leobardo Dumont LPN  Safety Promotion/Fall Prevention:   room organization consistent   safety round/check completed   nonskid shoes/slippers when out of bed   mobility aid in reach   muscle strengthening facilitated   lighting adjusted   activity supervised   assistive device/personal items within reach   fall prevention program maintained   clutter free environment maintained  Taken 8/3/2021 2300 by Leobardo Dumont LPN  Safety Promotion/Fall Prevention:   safety round/check completed   room organization consistent   nonskid shoes/slippers when out of bed   muscle strengthening facilitated   mobility aid in reach   fall prevention program maintained   assistive device/personal items within reach   activity supervised   clutter free environment maintained  Taken 8/3/2021 2101 by Leobardo Dumont LPN  Safety Promotion/Fall Prevention:   safety round/check completed   room organization  consistent   nonskid shoes/slippers when out of bed   muscle strengthening facilitated   mobility aid in reach   lighting adjusted  Taken 8/3/2021 1915 by Leobardo Dumont LPN  Safety Promotion/Fall Prevention:   safety round/check completed   room organization consistent   nonskid shoes/slippers when out of bed   muscle strengthening facilitated   mobility aid in reach   fall prevention program maintained   clutter free environment maintained   assistive device/personal items within reach   activity supervised   lighting adjusted  Intervention: Prevent Skin Injury  Recent Flowsheet Documentation  Taken 8/4/2021 0500 by Leobardo Dumont LPN  Body Position: position changed independently  Taken 8/4/2021 0310 by Leobardo Dumont LPN  Body Position: position changed independently  Taken 8/4/2021 0102 by Leobardo Dumont LPN  Body Position: position changed independently  Taken 8/3/2021 2300 by Leobardo Dumont LPN  Body Position: position changed independently  Taken 8/3/2021 2101 by Leobardo Dumont LPN  Body Position: position changed independently  Taken 8/3/2021 1915 by Leobardo Dumont LPN  Body Position: position changed independently  Intervention: Prevent Infection  Recent Flowsheet Documentation  Taken 8/4/2021 0500 by Leobardo Dumont LPN  Infection Prevention:   visitors restricted/screened   single patient room provided   rest/sleep promoted   personal protective equipment utilized   hand hygiene promoted  Taken 8/4/2021 0310 by Leobardo Dumont LPN  Infection Prevention:   visitors restricted/screened   single patient room provided   rest/sleep promoted   personal protective equipment utilized   hand hygiene promoted  Taken 8/4/2021 0102 by Leobardo Dumont LPN  Infection Prevention:   visitors restricted/screened   single patient room provided   rest/sleep promoted   personal protective equipment utilized   hand hygiene promoted  Taken 8/3/2021 2300 by Julia  Leobardo TSAI LPN  Infection Prevention:   visitors restricted/screened   single patient room provided   rest/sleep promoted   personal protective equipment utilized   hand hygiene promoted   environmental surveillance performed   cohorting utilized  Taken 8/3/2021 2101 by Leobardo Dumont LPN  Infection Prevention:   visitors restricted/screened   single patient room provided   rest/sleep promoted   personal protective equipment utilized  Taken 8/3/2021 1915 by Leobardo Dumont LPN  Infection Prevention: personal protective equipment utilized  Goal: Optimal Comfort and Wellbeing  Intervention: Provide Person-Centered Care  Recent Flowsheet Documentation  Taken 8/3/2021 1915 by Leobardo Dumont LPN  Trust Relationship/Rapport: care explained     Problem: Balance Impairment (Functional Deficit)  Goal: Improved Balance and Postural Control  Intervention: Optimize Balance and Safe Activity  Recent Flowsheet Documentation  Taken 8/4/2021 0500 by Leobardo Dumont LPN  Safety Promotion/Fall Prevention:   safety round/check completed   room organization consistent   nonskid shoes/slippers when out of bed   muscle strengthening facilitated   mobility aid in reach   lighting adjusted   activity supervised   assistive device/personal items within reach   clutter free environment maintained   elopement precautions   fall prevention program maintained  Taken 8/4/2021 0310 by Leobardo Dumont LPN  Safety Promotion/Fall Prevention:   safety round/check completed   room organization consistent   nonskid shoes/slippers when out of bed   muscle strengthening facilitated   mobility aid in reach   lighting adjusted   fall prevention program maintained   clutter free environment maintained   activity supervised   assistive device/personal items within reach  Taken 8/4/2021 0102 by Leobardo Dumont LPN  Safety Promotion/Fall Prevention:   room organization consistent   safety round/check completed   nonskid  shoes/slippers when out of bed   mobility aid in reach   muscle strengthening facilitated   lighting adjusted   activity supervised   assistive device/personal items within reach   fall prevention program maintained   clutter free environment maintained  Taken 8/3/2021 2300 by Leobardo Dumont LPN  Safety Promotion/Fall Prevention:   safety round/check completed   room organization consistent   nonskid shoes/slippers when out of bed   muscle strengthening facilitated   mobility aid in reach   fall prevention program maintained   assistive device/personal items within reach   activity supervised   clutter free environment maintained  Taken 8/3/2021 2101 by Leobardo Dumont LPN  Safety Promotion/Fall Prevention:   safety round/check completed   room organization consistent   nonskid shoes/slippers when out of bed   muscle strengthening facilitated   mobility aid in reach   lighting adjusted  Taken 8/3/2021 1915 by Leobardo Dumont LPN  Safety Promotion/Fall Prevention:   safety round/check completed   room organization consistent   nonskid shoes/slippers when out of bed   muscle strengthening facilitated   mobility aid in reach   fall prevention program maintained   clutter free environment maintained   assistive device/personal items within reach   activity supervised   lighting adjusted     Problem: Hyperosmolar Hyperglycemic State  Goal: Serum Glucose and Electrolytes Within Desired Range  Intervention: Monitor and Manage HHS  Recent Flowsheet Documentation  Taken 8/3/2021 1915 by Leobardo Dumont LPN  Glycemic Management: blood glucose monitoring     Problem: Fall Injury Risk  Goal: Absence of Fall and Fall-Related Injury  Intervention: Identify and Manage Contributors to Fall Injury Risk  Recent Flowsheet Documentation  Taken 8/4/2021 0500 by Leobardo Dumont LPN  Medication Review/Management: medications reviewed  Taken 8/4/2021 0310 by Leobardo Dumont LPN  Medication Review/Management:  medications reviewed  Taken 8/4/2021 0102 by Leobardo Dumont LPN  Medication Review/Management: medications reviewed  Taken 8/3/2021 2101 by Leobardo Dumont LPN  Medication Review/Management: medications reviewed  Taken 8/3/2021 1915 by Leobardo Dumont LPN  Medication Review/Management: medications reviewed  Intervention: Promote Injury-Free Environment  Recent Flowsheet Documentation  Taken 8/4/2021 0500 by Leobardo Dumont LPN  Safety Promotion/Fall Prevention:   safety round/check completed   room organization consistent   nonskid shoes/slippers when out of bed   muscle strengthening facilitated   mobility aid in reach   lighting adjusted   activity supervised   assistive device/personal items within reach   clutter free environment maintained   elopement precautions   fall prevention program maintained  Taken 8/4/2021 0310 by Leobardo Dumont LPN  Safety Promotion/Fall Prevention:   safety round/check completed   room organization consistent   nonskid shoes/slippers when out of bed   muscle strengthening facilitated   mobility aid in reach   lighting adjusted   fall prevention program maintained   clutter free environment maintained   activity supervised   assistive device/personal items within reach  Taken 8/4/2021 0102 by Leobardo Dumont LPN  Safety Promotion/Fall Prevention:   room organization consistent   safety round/check completed   nonskid shoes/slippers when out of bed   mobility aid in reach   muscle strengthening facilitated   lighting adjusted   activity supervised   assistive device/personal items within reach   fall prevention program maintained   clutter free environment maintained  Taken 8/3/2021 2300 by Leobardo Dumont LPN  Safety Promotion/Fall Prevention:   safety round/check completed   room organization consistent   nonskid shoes/slippers when out of bed   muscle strengthening facilitated   mobility aid in reach   fall prevention program maintained    assistive device/personal items within reach   activity supervised   clutter free environment maintained  Taken 8/3/2021 2101 by Leobardo Dumont LPN  Safety Promotion/Fall Prevention:   safety round/check completed   room organization consistent   nonskid shoes/slippers when out of bed   muscle strengthening facilitated   mobility aid in reach   lighting adjusted  Taken 8/3/2021 1915 by Leobardo Dumont LPN  Safety Promotion/Fall Prevention:   safety round/check completed   room organization consistent   nonskid shoes/slippers when out of bed   muscle strengthening facilitated   mobility aid in reach   fall prevention program maintained   clutter free environment maintained   assistive device/personal items within reach   activity supervised   lighting adjusted   Goal Outcome Evaluation:

## 2021-08-04 NOTE — NURSING NOTE
Pt was disturbed by an agitated pt who was load yellowing and screaming as they were transport in hallway by his room.  Multiple attempt by staff to reassure pt that he was safe and not in danger.   Inform pt due to HIIPA nature of other pt agitation   can not discussed.  Pt was not happy with answer again p was reassure of his safety and the other pt on the unit and inform pt security  was present during this time. Pt verbalize he would call 911, again informed pt there were reason to be calling 911 that his and other pt were in no danger of risk of harm.

## 2021-08-05 ENCOUNTER — TELEPHONE (OUTPATIENT)
Dept: DIABETES SERVICES | Facility: HOSPITAL | Age: 71
End: 2021-08-05

## 2021-08-05 NOTE — TELEPHONE ENCOUNTER
Patient's wife called asking what test strips to buy. Prescription for test strips not at pharmacy. Called patient's PCP to send through prescriptions.

## 2021-08-06 NOTE — DISCHARGE SUMMARY
"Date of Discharge:  8/6/2021    Discharge Diagnosis:     Presenting Problem/History of Present Illness  Active Hospital Problems    Diagnosis  POA   • Hyperglycemia [R73.9]  Yes      Resolved Hospital Problems   No resolved problems to display.          Hospital Course      Patient is a 70 y.o. male with history of CAD, hypertension, hyperlipidemia presents to the ER  with complaints of generalized fatigue and hyperglycemia today.  Patient states that he had outpatient CT abdomen pelvis done to evaluate his pancreas due to abnormal blood work, states that they took his blood sugar at the facility found to be elevated and was told to come to the ER.  Patient denies history of diabetes, does report family history of diabetes.  Patient reports that he has been feeling \"off\" for the last 4 weeks.  He reports diffuse abdominal pain, nausea, weakness and dizziness.  He denies any diarrhea vomiting, hematochezia or melena.  Patient also reports midsternal chest pain that occasionally radiates to his left shoulder.  He describes the chest pain as a achy pain that he rates a 5/10, intermittent.  Patient also reports feelings of polydipsia, polyuria.  He denies any numbness or tingling in his arms or legs.  He denies any falls, head injury or syncope.  He denies any abnormality in his gait or memory issues. He was admitted with severely elevated BS, CP, and dehydration. This morning he cont to have some chest \"heaviness\" on lower R side as well as epigastric pain. Denies HA, SOA, N/V, diarrhea today but has had some this past week.  In the ER, pt was found to have a sugar of 471.  He was not in DKA.  He was admitted for further evaluation.  She was started on lantus and SSI.  Endocrinology was consulted and he received DM education.  He was ruled out for chest pains with trop.  He did have an abnormal stress myoview. It showed suggestion of previous MI but no significant reversible ischemia.  He opted to discuss further " treatment options with his cardiologist.  He had no further chest pain.  His sugars started to decline and he was found to be stable for discharge.  He will have close f/u with  His PCP to continue to adjust his insulin.  He will continue lantus and mealtime insulin was added.      Procedures Performed         Consults:   Consults     Date and Time Order Name Status Description    8/3/2021 10:37 AM Inpatient Cardiology Consult Completed           Pertinent Test Results:    Lab Results (most recent)     Procedure Component Value Units Date/Time    POC Glucose Once [[id number]]  (Abnormal) Collected: 08/04/21 1125    Specimen: Blood Updated: 08/04/21 1126     Glucose 366 mg/dL      Comment: Serial Number: [id number]:  [id number]       POC Glucose Once [[id number]]  (Abnormal) Collected: 08/04/21 0712    Specimen: Blood Updated: 08/04/21 0713     Glucose 321 mg/dL      Comment: Serial Number: [id number]:  [id number]       Blood Gas, Arterial - [[id number]]  (Abnormal) Collected: 08/03/21 1132    Specimen: Arterial Blood Updated: 08/03/21 1134     Site Left Radial     Oh's Test Positive     pH, Arterial 7.380 pH units      pCO2, Arterial 29.6 mm Hg      pO2, Arterial 81.5 mm Hg      HCO3, Arterial 17.5 mmol/L      Base Excess, Arterial -6.4 mmol/L      Comment: Serial Number: [id number]:  [id number]        O2 Saturation, Arterial 95.9 %      CO2 Content 18.4 mmol/L      Barometric Pressure for Blood Gas --     Comment: N/A        Modality Room Air     FIO2 21 %      Hemodilution No    Comprehensive Metabolic Panel [[id number]]  (Abnormal) Collected: 08/03/21 0611    Specimen: Blood Updated: 08/03/21 0730     Glucose 388 mg/dL      BUN 15 mg/dL      Creatinine 1.04 mg/dL      Sodium 135 mmol/L      Potassium 4.4 mmol/L      Chloride 97 mmol/L      CO2 21.0 mmol/L      Calcium 8.8 mg/dL      Total Protein 6.4 g/dL      Albumin 3.90 g/dL      ALT (SGPT) 41 U/L      AST (SGOT) 30 U/L      Alkaline Phosphatase 106  U/L      Total Bilirubin 0.4 mg/dL      eGFR Non African Amer 71 mL/min/1.73      Globulin 2.5 gm/dL      A/G Ratio 1.6 g/dL      BUN/Creatinine Ratio 14.4     Anion Gap 17.0 mmol/L     Narrative:      GFR Normal >60  Chronic Kidney Disease <60  Kidney Failure <15      Troponin [[id number]]  (Normal) Collected: 08/03/21 0611    Specimen: Blood Updated: 08/03/21 0725     Troponin T <0.010 ng/mL     Narrative:      Troponin T Reference Range:  <= 0.03 ng/mL-   Negative for AMI  >0.03 ng/mL-     Abnormal for myocardial necrosis.  Clinicians would have to utilize clinical acumen, EKG, Troponin and serial changes to determine if it is an Acute Myocardial Infarction or myocardial injury due to an underlying chronic condition.       Results may be falsely decreased if patient taking Biotin.      Phosphorus [[id number]]  (Normal) Collected: 08/03/21 0611    Specimen: Blood Updated: 08/03/21 0725     Phosphorus 3.7 mg/dL     Magnesium [[id number]]  (Normal) Collected: 08/03/21 0611    Specimen: Blood Updated: 08/03/21 0725     Magnesium 2.0 mg/dL     CBC & Differential [[id number]]  (Normal) Collected: 08/03/21 0611    Specimen: Blood Updated: 08/03/21 0651    Narrative:      The following orders were created for panel order CBC & Differential.  Procedure                               Abnormality         Status                     ---------                               -----------         ------                     CBC Auto Differential[[id number]]        Normal              Final result                 Please view results for these tests on the individual orders.    CBC Auto Differential [[id number]]  (Normal) Collected: 08/03/21 0611    Specimen: Blood Updated: 08/03/21 0651     WBC 7.60 10*3/mm3      RBC 4.35 10*6/mm3      Hemoglobin 13.8 g/dL      Hematocrit 39.4 %      MCV 90.4 fL      MCH 31.6 pg      MCHC 35.0 g/dL      RDW 13.6 %      RDW-SD 42.9 fl      MPV 9.0 fL      Platelets 259 10*3/mm3      Neutrophil % 62.5 %       Lymphocyte % 23.4 %      Monocyte % 10.5 %      Eosinophil % 2.3 %      Basophil % 1.3 %      Neutrophils, Absolute 4.70 10*3/mm3      Lymphocytes, Absolute 1.80 10*3/mm3      Monocytes, Absolute 0.80 10*3/mm3      Eosinophils, Absolute 0.20 10*3/mm3      Basophils, Absolute 0.10 10*3/mm3      nRBC 0.1 /100 WBC     COVID PRE-OP / PRE-PROCEDURE SCREENING ORDER (NO ISOLATION) - Swab, Nasopharynx [[id number]]  (Normal) Collected: 08/02/21 1906    Specimen: Swab from Nasopharynx Updated: 08/02/21 1952    Narrative:      The following orders were created for panel order COVID PRE-OP / PRE-PROCEDURE SCREENING ORDER (NO ISOLATION) - Swab, Nasopharynx.  Procedure                               Abnormality         Status                     ---------                               -----------         ------                     COVID-19,CEPHEID,COR/[facility]...[[id number]]  Normal              Final result                 Please view results for these tests on the individual orders.    COVID-19,CEPHEID,COR/[facility]/PAD/BRAD IN-HOUSE(OR EMERGENT/ADD-ON),NP SWAB IN TRANSPORT MEDIA 3-4 HR TAT, RT-PCR - Swab, Nasopharynx [[id number]]  (Normal) Collected: 08/02/21 1906    Specimen: Swab from Nasopharynx Updated: 08/02/21 1952     COVID19 Not Detected    Narrative:      Fact sheet for providers: https://www.fda.gov/media/178966/download     Fact sheet for patients: https://www.fda.gov/media/350917/download  Fact sheet for providers: https://www.fda.gov/media/505040/download    Fact sheet for patients: https://www.fda.gov/media/377441/download    Test performed by PCR.    Blood Gas, Venous - [[id number]]  (Abnormal) Collected: 08/02/21 1609    Specimen: Venous Blood Updated: 08/02/21 1807     Site [facility]     pH, Venous 7.335 pH Units      pCO2, Venous 35.4 mm Hg      pO2, Venous 36.1 mm Hg      HCO3, Venous 18.9 mmol/L      Base Excess, Venous -6.2 mmol/L      Comment: Serial Number: [id number]:  [id number]        O2 Saturation, Venous 65.9 %       CO2 Content 20.0 mmol/L      Barometric Pressure for Blood Gas --     Comment: N/A        Modality Room Air     FIO2 21 %     Hemoglobin A1c [[id number]]  (Abnormal) Collected: 08/02/21 1610    Specimen: Blood Updated: 08/02/21 1654     Hemoglobin A1C 13.0 %     Narrative:      Hemoglobin A1C Reference Range:    <5.7 %        Normal  5.7-6.4 %     Increased risk for diabetes  > 6.4 %        Diabetes       These guidelines have been recommended by the American Diabetic Association for Hgb A1c.      The following 2010 guidelines have been recommended by the American Diabetes Association for Hemoglobin A1c.    HBA1c 5.7-6.4% Increased risk for future diabetes (pre-diabetes)  HBA1c     >6.4% Diabetes      Troponin [[id number]]  (Normal) Collected: 08/02/21 1610    Specimen: Blood Updated: 08/02/21 1636     Troponin T <0.010 ng/mL     Narrative:      Troponin T Reference Range:  <= 0.03 ng/mL-   Negative for AMI  >0.03 ng/mL-     Abnormal for myocardial necrosis.  Clinicians would have to utilize clinical acumen, EKG, Troponin and serial changes to determine if it is an Acute Myocardial Infarction or myocardial injury due to an underlying chronic condition.       Results may be falsely decreased if patient taking Biotin.      BNP [[id number]]  (Normal) Collected: 08/02/21 1503    Specimen: Blood from Arm, Right Updated: 08/02/21 1618     proBNP 76.1 pg/mL     Narrative:      Among patients with dyspnea, NT-proBNP is highly sensitive for the detection of acute congestive heart failure. In addition NT-proBNP of <300 pg/ml effectively rules out acute congestive heart failure with 99% negative predictive value.    Results may be falsely decreased if patient taking Biotin.      TSH [[id number]]  (Normal) Collected: 08/02/21 1503    Specimen: Blood from Arm, Right Updated: 08/02/21 1618     TSH 1.850 uIU/mL     [place] Draw [[id number]] Collected: 08/02/21 1503    Specimen: Blood from Arm, Right Updated: 08/02/21 1615     Narrative:      The following orders were created for panel order [place] Draw.  Procedure                               Abnormality         Status                     ---------                               -----------         ------                     Green Top (Gel)[[id number]]                                  Final result               Lavender Top[[id number]]                                     Final result               Gold Top - SST[[id number]]                                   Final result                 Please view results for these tests on the individual orders.    Green Top (Gel) [[id number]] Collected: 08/02/21 1503    Specimen: Blood from Arm, Right Updated: 08/02/21 1615     Extra Tube Hold for add-ons.     Comment: Auto resulted.       Gold Top - SST [[id number]] Collected: 08/02/21 1503    Specimen: Blood from Arm, Right Updated: 08/02/21 1615     Extra Tube Hold for add-ons.     Comment: Auto resulted.       Comprehensive Metabolic Panel [[id number]]  (Abnormal) Collected: 08/02/21 1503    Specimen: Blood from Arm, Right Updated: 08/02/21 1615     Glucose 463 mg/dL      BUN 18 mg/dL      Creatinine 1.11 mg/dL      Sodium 128 mmol/L      Potassium 4.5 mmol/L      Chloride 88 mmol/L      CO2 18.0 mmol/L      Calcium 9.3 mg/dL      Total Protein 6.8 g/dL      Albumin 4.00 g/dL      ALT (SGPT) 46 U/L      AST (SGOT) 33 U/L      Alkaline Phosphatase 117 U/L      Total Bilirubin 0.6 mg/dL      eGFR Non African Amer 65 mL/min/1.73      Globulin 2.8 gm/dL      A/G Ratio 1.4 g/dL      BUN/Creatinine Ratio 16.2     Anion Gap 22.0 mmol/L     Narrative:      GFR Normal >60  Chronic Kidney Disease <60  Kidney Failure <15      Lipase [[id number]]  (Abnormal) Collected: 08/02/21 1503    Specimen: Blood from Arm, Right Updated: 08/02/21 1612     Lipase 76 U/L     CK [[id number]]  (Normal) Collected: 08/02/21 1503    Specimen: Blood from Arm, Right Updated: 08/02/21 1612     Creatine Kinase 55 U/L     Phosphorus  [[id number]]  (Abnormal) Collected: 08/02/21 1503    Specimen: Blood from Arm, Right Updated: 08/02/21 1612     Phosphorus 4.6 mg/dL     Magnesium [[id number]]  (Normal) Collected: 08/02/21 1503    Specimen: Blood from Arm, Right Updated: 08/02/21 1612     Magnesium 2.0 mg/dL     Osmolality, Serum [[id number]]  (Abnormal) Collected: 08/02/21 1503    Specimen: Blood from Arm, Right Updated: 08/02/21 1602     Osmolality 306 mOsm/kg     Ketone Bodies, Serum (Not performed at [place]) [[id number]]  (Abnormal) Collected: 08/02/21 1503    Specimen: Blood from Arm, Right Updated: 08/02/21 1536    Narrative:      The following orders were created for panel order Ketone Bodies, Serum (Not performed at [place]).  Procedure                               Abnormality         Status                     ---------                               -----------         ------                     Acetone[[id number]]                      Abnormal            Final result                 Please view results for these tests on the individual orders.    Acetone [[id number]]  (Abnormal) Collected: 08/02/21 1503    Specimen: Blood from Arm, Right Updated: 08/02/21 1536     Acetone Small    Urinalysis With Culture If Indicated - Urine, Clean Catch [[id number]]  (Abnormal) Collected: 08/02/21 1509    Specimen: Urine, Clean Catch Updated: 08/02/21 1523     Color, UA Yellow     Appearance, UA Clear     pH, UA <=5.0     Specific Gravity, UA 1.047     Glucose, UA >=1000 mg/dL (3+)     Ketones, UA 80 mg/dL (3+)     Bilirubin, UA Moderate (2+)     Comment: Confirmation testing is unavailable.  A serum bilirubin is recommended for further assessment.        Blood, UA Negative     Protein, UA Negative     Leuk Esterase, UA Negative     Nitrite, UA Negative     Urobilinogen, UA 0.2 E.U./dL    Narrative:      Urine microscopic not indicated.    Lavender Top [[id number]] Collected: 08/02/21 1503    Specimen: Blood from Arm, Right Updated: 08/02/21 1515     Protime-INR [[id number]]  (Normal) Collected: 08/02/21 1503    Specimen: Blood from Arm, Right Updated: 08/02/21 1517     Protime 11.1 Seconds      INR 1.00    CBC & Differential [[id number]]  (Normal) Collected: 08/02/21 1503    Specimen: Blood from Arm, Right Updated: 08/02/21 1510    Narrative:      The following orders were created for panel order CBC & Differential.  Procedure                               Abnormality         Status                     ---------                               -----------         ------                     CBC Auto Differential[[id number]]        Normal              Final result                 Please view results for these tests on the individual orders.    CBC Auto Differential [[id number]]  (Normal) Collected: 08/02/21 1503    Specimen: Blood from Arm, Right Updated: 08/02/21 1510     WBC 8.00 10*3/mm3      RBC 4.66 10*6/mm3      Hemoglobin 14.6 g/dL      Hematocrit 41.9 %      MCV 90.0 fL      MCH 31.3 pg      MCHC 34.8 g/dL      RDW 13.4 %      RDW-SD 42.4 fl      MPV 8.6 fL      Platelets 286 10*3/mm3      Neutrophil % 62.7 %      Lymphocyte % 23.3 %      Monocyte % 11.2 %      Eosinophil % 1.8 %      Basophil % 1.0 %      Neutrophils, Absolute 5.00 10*3/mm3      Lymphocytes, Absolute 1.90 10*3/mm3      Monocytes, Absolute 0.90 10*3/mm3      Eosinophils, Absolute 0.10 10*3/mm3      Basophils, Absolute 0.10 10*3/mm3      nRBC 0.1 /100 WBC                   Condition on Discharge:  good    Vital Signs       Physical Exam:     General Appearance:    Alert, cooperative, in no acute distress   Head:    Normocephalic, without obvious abnormality, atraumatic   Eyes:            Lids and lashes normal, conjunctivae and sclerae normal, no   icterus, no pallor, corneas clear, PERRLA   Ears:    Ears appear intact with no abnormalities noted   Throat:   No oral lesions, no thrush, oral mucosa moist   Neck:   No adenopathy, supple, trachea midline, no thyromegaly, no   carotid bruit, no JVD    Lungs:     Clear to auscultation,respirations regular, even and                  unlabored    Heart:    Regular rhythm and normal rate, normal S1 and S2, no            murmur, no gallop, no rub, no click   Chest Wall:    No abnormalities observed   Abdomen:     Normal bowel sounds, no masses, no organomegaly, soft        non-tender, non-distended, no guarding, no rebound                tenderness   Extremities:   Moves all extremities well, no edema, no cyanosis, no             redness   Pulses:   Pulses palpable and equal bilaterally   Skin:   No bleeding, bruising or rash   Lymph nodes:   No palpable adenopathy   Neurologic:   Cranial nerves 2 - 12 grossly intact, sensation intact, DTR       present and equal bilaterally       Discharge Disposition  Home or Self Care    Discharge Medications     Discharge Medications      New Medications      Instructions Start Date   HumaLOG KwikPen 100 UNIT/ML solution pen-injector  Generic drug: Insulin Lispro (1 Unit Dial)   10 Units, Subcutaneous, 3 Times Daily With Meals      Lantus SoloStar 100 UNIT/ML injection pen  Generic drug: Insulin Glargine   30 Units, Subcutaneous, Daily With Breakfast      nitroglycerin 0.4 MG SL tablet  Commonly known as: NITROSTAT   0.4 mg, Sublingual, Every 5 Minutes PRN, Take no more than 3 doses in 15 minutes.      Unifine Pentips 32G X 4 MM misc  Generic drug: Insulin Pen Needle   use as directed         Continue These Medications      Instructions Start Date   amLODIPine-benazepril 5-20 MG per capsule  Commonly known as: LOTREL 5-20   1 capsule, Oral, Daily      aspirin 81 MG tablet   81 mg, Oral, Daily      atorvastatin 40 MG tablet  Commonly known as: LIPITOR   40 mg, Oral, Daily      benazepril 20 MG tablet  Commonly known as: LOTENSIN   20 mg, Oral, Daily      Biotin 14786 MCG tablet   1 tablet, Oral, Daily      Cinnamon Plus Chromium  MCG-MG capsule  Generic drug: Chromium-Cinnamon   1 capsule, Oral, 2 times daily      CoQ-10  200 MG capsule   1 capsule, Oral, Daily      EQL Vitamin D3 50 MCG (2000 UT) capsule  Generic drug: Cholecalciferol   2,000 Units, Oral, Daily      GLUCOSAMINE CHONDR 1500 COMPLX PO   1 capsule, Oral, Daily      hydroCHLOROthiazide 25 MG tablet  Commonly known as: HYDRODIURIL   25 mg, Oral, Daily      meloxicam 15 MG tablet  Commonly known as: MOBIC   Every 24 Hours      metoprolol succinate XL 50 MG 24 hr tablet  Commonly known as: TOPROL-XL   50 mg, Oral, Daily      multivitamin capsule   1 capsule, Oral, Daily      pantoprazole 40 MG EC tablet  Commonly known as: PROTONIX   Every 24 Hours      potassium chloride 10 MEQ CR tablet   10 mEq, Oral, Daily      saccharomyces boulardii 250 MG capsule  Commonly known as: FLORASTOR   250 mg, Oral, Daily      Turmeric 500 MG tablet   1 tablet, Oral, Daily      Vitamin B 12 500 MCG tablet   500 mcg, Oral, Daily      Vitamin B12-Folic Acid 500-400 MCG tablet   1 tablet, Oral, Daily      vitamin C 500 MG tablet  Commonly known as: ASCORBIC ACID   1,000 mg, Oral, Every 12 Hours      ZyrTEC Allergy 10 MG tablet dispersible  Generic drug: Cetirizine HCl   2 times daily             Discharge Diet:   Diet Instructions     Diabetic diet            Activity at Discharge:   Activity Instructions     As tolerated            Follow-up Appointments  Future Appointments   Date Time Provider Department [place]   9/23/2021 11:40 AM Janey Craven MD [facility]     Additional Instructions for the Follow-ups that You Need to Schedule     Discharge Follow-up with PCP   As directed       Currently Documented PCP:    Nellie Adrian MD    PCP Phone Number:    [phone]     Follow Up Details: Dr. Ortega in 1-2 weeks         Discharge Follow-up with Specialty: cardiology; 2 Weeks   As directed      Specialty: cardiology    Follow Up: 2 Weeks               Test Results Pending at Discharge       Alfreda Ortega MD  08/06/21  13:04 EDT

## 2021-08-17 ENCOUNTER — TELEPHONE (OUTPATIENT)
Dept: DIABETES SERVICES | Facility: HOSPITAL | Age: 71
End: 2021-08-17

## 2021-08-17 NOTE — TELEPHONE ENCOUNTER
Pt called back stating rx was not sent to pharmacy for Accuchek Guide test strips and Accuchek Softclix lancets. Pt states his wife bought these supplies over the counter. Pt is scheduled to see Dr. DANII Anderson this Friday. Discussed since he has enough supplies at this time, he needs to request Dr. Anderson send rxs for test strips and lancets through at his office visit this Friday. Pt verbalized understanding.

## 2021-08-17 NOTE — TELEPHONE ENCOUNTER
Spoke with patient and he recommended educator speak with wife, Colette. Colette states pt received his insulin pens for both the long-acting insulin and rapid acting insulin. He has been taking as prescribed. He also received the test strips and lancets for bs monitoring. Pt has been checking bs 3-4 times/day. He has follow up visit scheduled with MD this Friday. Pt/wife with no additional questions.

## 2021-08-18 PROBLEM — Z95.5 PRESENCE OF CORONARY ANGIOPLASTY IMPLANT AND GRAFT: Status: ACTIVE | Noted: 2021-06-21

## 2021-08-18 PROBLEM — M17.12 DEGENERATIVE JOINT DISEASE OF LEFT KNEE: Status: ACTIVE | Noted: 2018-05-31

## 2021-08-18 PROBLEM — G47.33 OBSTRUCTIVE SLEEP APNEA OF ADULT: Status: ACTIVE | Noted: 2020-09-24

## 2021-08-18 PROBLEM — M50.30 DEGENERATIVE DISC DISEASE, CERVICAL: Status: ACTIVE | Noted: 2021-08-18

## 2021-08-18 PROBLEM — E66.3 OVERWEIGHT: Status: ACTIVE | Noted: 2021-06-21

## 2021-08-18 PROBLEM — I10 ESSENTIAL HYPERTENSION: Status: ACTIVE | Noted: 2017-04-05

## 2021-08-18 RX ORDER — AMOXICILLIN 500 MG/1
CAPSULE ORAL
COMMUNITY
Start: 2021-07-07 | End: 2021-08-20

## 2021-08-18 RX ORDER — AMPICILLIN TRIHYDRATE 250 MG
CAPSULE ORAL
COMMUNITY

## 2021-08-20 ENCOUNTER — OFFICE VISIT (OUTPATIENT)
Dept: ENDOCRINOLOGY | Facility: CLINIC | Age: 71
End: 2021-08-20

## 2021-08-20 VITALS
DIASTOLIC BLOOD PRESSURE: 80 MMHG | OXYGEN SATURATION: 98 % | HEIGHT: 72 IN | BODY MASS INDEX: 29.66 KG/M2 | TEMPERATURE: 98 F | HEART RATE: 67 BPM | WEIGHT: 219 LBS | SYSTOLIC BLOOD PRESSURE: 122 MMHG

## 2021-08-20 DIAGNOSIS — E11.65 TYPE 2 DIABETES MELLITUS WITH HYPERGLYCEMIA, WITH LONG-TERM CURRENT USE OF INSULIN (HCC): Primary | ICD-10-CM

## 2021-08-20 DIAGNOSIS — E78.2 MIXED HYPERLIPIDEMIA: ICD-10-CM

## 2021-08-20 DIAGNOSIS — I10 ESSENTIAL HYPERTENSION: ICD-10-CM

## 2021-08-20 DIAGNOSIS — Z79.4 TYPE 2 DIABETES MELLITUS WITH HYPERGLYCEMIA, WITH LONG-TERM CURRENT USE OF INSULIN (HCC): Primary | ICD-10-CM

## 2021-08-20 LAB — GLUCOSE BLDC GLUCOMTR-MCNC: 154 MG/DL (ref 70–105)

## 2021-08-20 PROCEDURE — 82962 GLUCOSE BLOOD TEST: CPT | Performed by: INTERNAL MEDICINE

## 2021-08-20 PROCEDURE — 99214 OFFICE O/P EST MOD 30 MIN: CPT | Performed by: INTERNAL MEDICINE

## 2021-08-20 RX ORDER — INSULIN GLARGINE 100 [IU]/ML
30 INJECTION, SOLUTION SUBCUTANEOUS
Qty: 15 ML | Refills: 1 | Status: SHIPPED | OUTPATIENT
Start: 2021-08-20 | End: 2021-12-07

## 2021-08-20 RX ORDER — INSULIN LISPRO 100 [IU]/ML
10 INJECTION, SOLUTION INTRAVENOUS; SUBCUTANEOUS
Qty: 15 ML | Refills: 1 | Status: SHIPPED | OUTPATIENT
Start: 2021-08-20 | End: 2021-12-07

## 2021-08-20 NOTE — PATIENT INSTRUCTIONS
Start Jardiance 10 mg p.o. daily  If you develop any itching or rash in the genital region then call me  If your sugar starts running less than 100 then send me some blood sugar records for review to adjust the insulin dose  Always keep glucose source in case of low blood sugar and if your blood sugars below 70 then start treating with glucose source  Arrange for comprehensive diabetes education  Annual eye exam and flu vaccine  Labs before follow-up.

## 2021-08-20 NOTE — PROGRESS NOTES
Endocrine Progress Note Outpatient     Patient Care Team:  Nellie Adrian MD as PCP - General (Family Medicine)    Chief Complaint: Uncontrolled type 2 diabetes          HPI: This is a 70-year-old male who was recently hospitalized back in August with fatigue and tiredness and was found to have significant hyperglycemia and a new diagnosis of diabetes was made.  He also have history of hypertension, CAD and hyperlipidemia.  He underwent a stent placement in 2012.  For type 2 diabetes: New diagnosis in August 2021.  Is currently on Lantus 30 units subcu daily in the morning with Humalog 10 units with each meal.  He is checking blood sugars at least 4 times a day and did bring in blood sugar records for review and they are beginning to improve now.  He has not done diabetes education.  Not having any low blood sugars.  He is beginning to feel better but he still have some weakness.  Hypertension: Well-controlled  Hyperlipidemia: Currently on atorvastatin  CAD: Status post stent placement in the past.    Data reviewed:  Hemoglobin A1c (08/02/2021 16:10)   Comprehensive Metabolic Panel (08/03/2021 06:11)       Past Medical History:   Diagnosis Date   • Allergic rhinitis    • Degenerative joint disease    • FH: cholecystectomy    • Hypercholesteremia    • Hypertension    • NEREIDA (obstructive sleep apnea)    • Type 2 diabetes mellitus (CMS/[facility])        Social History     Socioeconomic History   • Marital status:      Spouse name: Not on file   • Number of children: Not on file   • Years of education: Not on file   • Highest education level: Not on file   Tobacco Use   • Smoking status: Never Smoker   • Smokeless tobacco: Never Used   Vaping Use   • Vaping Use: Never used   Substance and Sexual Activity   • Alcohol use: Not Currently   • Drug use: Never   • Sexual activity: Defer     Partners: Female       Family History   Problem Relation Age of Onset   • Heart disease Mother    • Heart disease Father    •  Diabetes Sister    • Hypertension Sister    • Thyroid disease Sister        No Known Allergies    ROS:   Constitutional:  Denies fatigue, tiredness.    Eyes:  Denies change in visual acuity   HENT:  Denies nasal congestion or sore throat   Respiratory: denies cough, shortness of breath.   Cardiovascular:  denies chest pain, edema   GI:  Denies abdominal pain, nausea, vomiting.   Musculoskeletal:  Denies back pain or joint pain   Integument:  Denies dry skin and rash   Neurologic:  Denies headache, focal weakness or sensory changes   Endocrine:  Denies polyuria or polydipsia   Psychiatric:  Denies depression or anxiety      Vitals:    08/20/21 1122   BP: 122/80   Pulse: 67   Temp: 98 °F (36.7 °C)   SpO2: 98%     Body mass index is 29.7 kg/m².     Physical Exam:  GEN: NAD, conversant  EYES: EOMI, PERRL, no conjunctival erythema  NECK: no thyromegaly, full ROM   CV: RRR, no murmurs/rubs/gallops, no peripheral edema  LUNG: CTAB, no wheezes/rales/ronchi  SKIN: no rashes, no acanthosis  MSK: no deformities, full ROM of all extremities  NEURO: no tremors, DTR normal  PSYCH: AOX3, appropriate mood, affect normal      Results Review:     I reviewed the patient's new clinical results.    Lab Results   Component Value Date    HGBA1C 13.0 (H) 08/02/2021      Lab Results   Component Value Date    GLUCOSE 388 (H) 08/03/2021    BUN 15 08/03/2021    CREATININE 1.04 08/03/2021    EGFRIFNONA 71 08/03/2021    BCR 14.4 08/03/2021    K 4.4 08/03/2021    CO2 21.0 (L) 08/03/2021    CALCIUM 8.8 08/03/2021    ALBUMIN 3.90 08/03/2021    AST 30 08/03/2021    ALT 41 08/03/2021     Lab Results   Component Value Date    TSH 1.850 08/02/2021         Medication Review: Reviewed.       Current Outpatient Medications:   •  amLODIPine-benazepril (LOTREL 5-20) 5-20 MG per capsule, Take 1 capsule by mouth Daily., Disp: , Rfl:   •  aspirin 81 MG tablet, Take 81 mg by mouth Daily., Disp: , Rfl:   •  atorvastatin (LIPITOR) 40 MG tablet, Take 40 mg by  mouth Daily., Disp: , Rfl:   •  benazepril (LOTENSIN) 20 MG tablet, Take 20 mg by mouth Daily., Disp: , Rfl:   •  Biotin 90007 MCG tablet, Take 1 tablet by mouth Daily., Disp: , Rfl:   •  Cetirizine HCl (ZYRTEC ALLERGY) 10 MG tablet dispersible, 2 (two) times a day., Disp: , Rfl:   •  Cholecalciferol (EQL VITAMIN D3) 2000 units capsule, Take 2,000 Units by mouth Daily., Disp: , Rfl:   •  Chromium-Cinnamon (CINNAMON PLUS CHROMIUM)  MCG-MG capsule, Take 1 capsule by mouth 2 (two) times a day., Disp: , Rfl:   •  Cinnamon 500 MG capsule, Take  by mouth., Disp: , Rfl:   •  Cobalamine Combinations (VITAMIN B12-FOLIC ACID) 500-400 MCG tablet, Take 1 tablet by mouth Daily., Disp: , Rfl:   •  Coenzyme Q10 (COQ-10) 200 MG capsule, Take 1 capsule by mouth Daily., Disp: , Rfl:   •  Cyanocobalamin (Vitamin B 12) 500 MCG tablet, Take 500 mcg by mouth Daily., Disp: , Rfl:   •  Glucosamine-Chondroit-Vit C-Mn (GLUCOSAMINE CHONDR 1500 COMPLX PO), Take 1 capsule by mouth Daily., Disp: , Rfl:   •  hydroCHLOROthiazide (HYDRODIURIL) 25 MG tablet, Take 25 mg by mouth Daily., Disp: , Rfl:   •  Insulin Glargine (Lantus SoloStar) 100 UNIT/ML injection pen, Inject 30 Units under the skin into the appropriate area as directed Daily With Breakfast., Disp: 15 mL, Rfl: 6  •  Insulin Lispro, 1 Unit Dial, (HumaLOG KwikPen) 100 UNIT/ML solution pen-injector, Inject 10 Units under the skin into the appropriate area as directed 3 (Three) Times a Day With Meals., Disp: 15 mL, Rfl: 6  •  Insulin Pen Needle (Unifine Pentips) 32G X 4 MM misc, use as directed, Disp: 100 each, Rfl: 12  •  meloxicam (MOBIC) 15 MG tablet, Daily., Disp: , Rfl:   •  metoprolol succinate XL (TOPROL-XL) 50 MG 24 hr tablet, Take 50 mg by mouth Daily., Disp: , Rfl:   •  Multiple Vitamin (MULTIVITAMIN) capsule, Take 1 capsule by mouth Daily., Disp: , Rfl:   •  nitroglycerin (NITROSTAT) 0.4 MG SL tablet, Place 1 tablet under the tongue Every 5 (Five) Minutes As Needed for Chest  Pain. Take no more than 3 doses in 15 minutes., Disp: 25 tablet, Rfl: 12  •  pantoprazole (PROTONIX) 40 MG EC tablet, Daily., Disp: , Rfl:   •  potassium chloride 10 MEQ CR tablet, Take 10 mEq by mouth Daily., Disp: , Rfl:   •  Probiotic Product capsule, Take 1 tablet by mouth Daily., Disp: , Rfl:   •  saccharomyces boulardii (FLORASTOR) 250 MG capsule, Take 250 mg by mouth Daily., Disp: , Rfl:   •  Turmeric 500 MG tablet, Take 1 tablet by mouth Daily., Disp: , Rfl:   •  vitamin C (ASCORBIC ACID) 500 MG tablet, Take 1,000 mg by mouth Every 12 (Twelve) Hours., Disp: , Rfl:       Assessment/Plan   1.  Diabetes mellitus type 2: New diagnosis and uncontrolled, he is improving with regards to his blood sugars.  At this time I will add Jardiance 10 mg p.o. daily.  Side effects of excessive urination and yeast infection discussed with him and he is advised to call me if he develops any itching or rash in the genital region.  Also that we talked about hypoglycemia, if his blood sugar starts below 100 then he will call us to adjust the insulin.  He will treat low blood sugars less than 70 with glucose source and is advised to always keep glucose source.  Recommend annual eye exam as well.  Will refer for comprehensive diabetes education as well.  2.  Hypertension: Well-controlled, continue current medication  3.  Hyperlipidemia: Currently on atorvastatin, will follow lipid panel  4.  CAD: Status post stent placement.            Bryce Anderson MD FACE.

## 2021-08-25 DIAGNOSIS — Z79.4 TYPE 2 DIABETES MELLITUS WITH HYPERGLYCEMIA, WITH LONG-TERM CURRENT USE OF INSULIN (HCC): Primary | ICD-10-CM

## 2021-08-25 DIAGNOSIS — E11.65 TYPE 2 DIABETES MELLITUS WITH HYPERGLYCEMIA, WITH LONG-TERM CURRENT USE OF INSULIN (HCC): Primary | ICD-10-CM

## 2021-08-25 RX ORDER — LANCETS
EACH MISCELLANEOUS
Qty: 100 EACH | Refills: 12 | Status: SHIPPED | OUTPATIENT
Start: 2021-08-25 | End: 2022-08-25

## 2021-08-25 RX ORDER — BLOOD SUGAR DIAGNOSTIC
STRIP MISCELLANEOUS
Qty: 100 EACH | Refills: 12 | Status: SHIPPED | OUTPATIENT
Start: 2021-08-25

## 2021-08-25 RX ORDER — PEN NEEDLE, DIABETIC 32GX 5/32"
NEEDLE, DISPOSABLE MISCELLANEOUS
Qty: 200 EACH | Refills: 12 | Status: SHIPPED | OUTPATIENT
Start: 2021-08-25 | End: 2022-10-27

## 2021-09-16 ENCOUNTER — OFFICE VISIT (OUTPATIENT)
Dept: ENDOCRINOLOGY | Facility: CLINIC | Age: 71
End: 2021-09-16

## 2021-09-16 DIAGNOSIS — E11.65 TYPE 2 DIABETES MELLITUS WITH HYPERGLYCEMIA, WITH LONG-TERM CURRENT USE OF INSULIN (HCC): ICD-10-CM

## 2021-09-16 DIAGNOSIS — Z79.4 TYPE 2 DIABETES MELLITUS WITH HYPERGLYCEMIA, WITH LONG-TERM CURRENT USE OF INSULIN (HCC): ICD-10-CM

## 2021-09-16 PROCEDURE — G0108 DIAB MANAGE TRN  PER INDIV: HCPCS | Performed by: DIETITIAN, REGISTERED

## 2021-09-29 ENCOUNTER — OFFICE VISIT (OUTPATIENT)
Dept: ENDOCRINOLOGY | Facility: CLINIC | Age: 71
End: 2021-09-29

## 2021-09-29 DIAGNOSIS — E11.65 TYPE 2 DIABETES MELLITUS WITH HYPERGLYCEMIA, WITH LONG-TERM CURRENT USE OF INSULIN (HCC): ICD-10-CM

## 2021-09-29 DIAGNOSIS — Z79.4 TYPE 2 DIABETES MELLITUS WITH HYPERGLYCEMIA, WITH LONG-TERM CURRENT USE OF INSULIN (HCC): ICD-10-CM

## 2021-09-29 PROCEDURE — G0109 DIAB MANAGE TRN IND/GROUP: HCPCS | Performed by: DIETITIAN, REGISTERED

## 2021-10-06 ENCOUNTER — OFFICE VISIT (OUTPATIENT)
Dept: ENDOCRINOLOGY | Facility: CLINIC | Age: 71
End: 2021-10-06

## 2021-10-06 DIAGNOSIS — Z79.4 TYPE 2 DIABETES MELLITUS WITH HYPERGLYCEMIA, WITH LONG-TERM CURRENT USE OF INSULIN (HCC): ICD-10-CM

## 2021-10-06 DIAGNOSIS — E11.65 TYPE 2 DIABETES MELLITUS WITH HYPERGLYCEMIA, WITH LONG-TERM CURRENT USE OF INSULIN (HCC): ICD-10-CM

## 2021-10-06 PROCEDURE — G0109 DIAB MANAGE TRN IND/GROUP: HCPCS | Performed by: DIETITIAN, REGISTERED

## 2021-10-07 ENCOUNTER — TELEPHONE (OUTPATIENT)
Dept: ENDOCRINOLOGY | Facility: CLINIC | Age: 71
End: 2021-10-07

## 2021-10-07 NOTE — TELEPHONE ENCOUNTER
Dr. Anderson,    ADA requires MD to electronically sign acknowledgement of letter of diabetes self management education. MD letter and goals scanned into phone note. Ready for electronic signature by signing this encounter. Thank you!

## 2021-12-02 ENCOUNTER — OFFICE VISIT (OUTPATIENT)
Dept: PODIATRY | Facility: CLINIC | Age: 71
End: 2021-12-02

## 2021-12-02 VITALS
DIASTOLIC BLOOD PRESSURE: 69 MMHG | HEIGHT: 72 IN | SYSTOLIC BLOOD PRESSURE: 124 MMHG | HEART RATE: 59 BPM | WEIGHT: 219 LBS | BODY MASS INDEX: 29.66 KG/M2

## 2021-12-02 DIAGNOSIS — E11.65 TYPE 2 DIABETES MELLITUS WITH HYPERGLYCEMIA, WITHOUT LONG-TERM CURRENT USE OF INSULIN (HCC): Primary | ICD-10-CM

## 2021-12-02 DIAGNOSIS — M77.41 METATARSALGIA OF BOTH FEET: ICD-10-CM

## 2021-12-02 DIAGNOSIS — M77.42 METATARSALGIA OF BOTH FEET: ICD-10-CM

## 2021-12-02 DIAGNOSIS — L60.3 ONYCHODYSTROPHY: ICD-10-CM

## 2021-12-02 PROCEDURE — 99203 OFFICE O/P NEW LOW 30 MIN: CPT | Performed by: PODIATRIST

## 2021-12-02 RX ORDER — GABAPENTIN 100 MG/1
CAPSULE ORAL
COMMUNITY
Start: 2021-11-02

## 2021-12-03 NOTE — PROGRESS NOTES
12/02/2021  Foot and Ankle Surgery - New Patient   Provider: Dr. Dick Luna DPM  Location: [facility]    Subjective:  Tom Steele is a 71 y.o. male.     Chief Complaint   Patient presents with   • Left Foot - Pain   • Right Foot - Pain   • Initial Evaluation     last pcp appt 10/1/2021       HPI: Patient is a 71-year-old diabetic male that was referred to me by his primary care physician for routine diabetic foot check. Patient states that he is doing quite well and denies any prominent pain or limitation involving his feet. He feels that his blood sugars are relatively high but cannot recall his most recent A1c. Patient has not had any previous open wounds or infections to his feet. He does have mild numbness and tingling involving the forefoot regions. Patient has not seen a podiatrist in the past.    No Known Allergies    Past Medical History:   Diagnosis Date   • Allergic rhinitis    • Degenerative joint disease    • FH: cholecystectomy    • Hypercholesteremia    • Hypertension    • NEREIDA (obstructive sleep apnea)    • Type 2 diabetes mellitus (HCC)        Past Surgical History:   Procedure Laterality Date   • BACK SURGERY     • FOOT SURGERY     • KNEE SURGERY     • NECK SURGERY         Family History   Problem Relation Age of Onset   • Heart disease Mother    • Heart disease Father    • Diabetes Sister    • Hypertension Sister    • Thyroid disease Sister        Social History     Socioeconomic History   • Marital status:    Tobacco Use   • Smoking status: Never Smoker   • Smokeless tobacco: Never Used   Vaping Use   • Vaping Use: Never used   Substance and Sexual Activity   • Alcohol use: Not Currently   • Drug use: Never   • Sexual activity: Defer     Partners: Female        Current Outpatient Medications on File Prior to Visit   Medication Sig Dispense Refill   • Accu-Chek Softclix Lancets lancets Use to test blood sugar 3 times daily. Dx: E11.65 100 each 12   • amLODIPine-benazepril  (LOTREL 5-20) 5-20 MG per capsule Take 1 capsule by mouth Daily.     • aspirin 81 MG tablet Take 81 mg by mouth Daily.     • atorvastatin (LIPITOR) 40 MG tablet Take 40 mg by mouth Daily.     • benazepril (LOTENSIN) 20 MG tablet Take 20 mg by mouth Daily.     • Biotin 79894 MCG tablet Take 1 tablet by mouth Daily.     • Cetirizine HCl (ZYRTEC ALLERGY) 10 MG tablet dispersible 2 (two) times a day.     • Cholecalciferol (EQL VITAMIN D3) 2000 units capsule Take 2,000 Units by mouth Daily.     • Chromium-Cinnamon (CINNAMON PLUS CHROMIUM)  MCG-MG capsule Take 1 capsule by mouth 2 (two) times a day.     • Cinnamon 500 MG capsule Take  by mouth.     • Cobalamine Combinations (VITAMIN B12-FOLIC ACID) 500-400 MCG tablet Take 1 tablet by mouth Daily.     • Coenzyme Q10 (COQ-10) 200 MG capsule Take 1 capsule by mouth Daily.     • Cyanocobalamin (Vitamin B 12) 500 MCG tablet Take 500 mcg by mouth Daily.     • empagliflozin (Jardiance) 10 MG tablet tablet Take 1 tablet by mouth Daily. 30 tablet 6   • Glucosamine-Chondroit-Vit C-Mn (GLUCOSAMINE CHONDR 1500 COMPLX PO) Take 1 capsule by mouth Daily.     • glucose blood (Accu-Chek Guide) test strip Use to test blood sugar 3 times daily. Dx: E11.65 100 each 12   • hydroCHLOROthiazide (HYDRODIURIL) 25 MG tablet Take 25 mg by mouth Daily.     • Insulin Glargine (Lantus SoloStar) 100 UNIT/ML injection pen Inject 30 Units under the skin into the appropriate area as directed Daily With Breakfast. 15 mL 1   • Insulin Lispro, 1 Unit Dial, (HumaLOG KwikPen) 100 UNIT/ML solution pen-injector Inject 10 Units under the skin into the appropriate area as directed 3 (Three) Times a Day With Meals. 15 mL 1   • Insulin Pen Needle (Unifine Pentips) 32G X 4 MM misc Use to inject insulin 4 times daily. Dx: E11.65 200 each 12   • meloxicam (MOBIC) 15 MG tablet Daily.     • metoprolol succinate XL (TOPROL-XL) 50 MG 24 hr tablet Take 50 mg by mouth Daily.     • Multiple Vitamin (MULTIVITAMIN)  "capsule Take 1 capsule by mouth Daily.     • nitroglycerin (NITROSTAT) 0.4 MG SL tablet Place 1 tablet under the tongue Every 5 (Five) Minutes As Needed for Chest Pain. Take no more than 3 doses in 15 minutes. 25 tablet 12   • pantoprazole (PROTONIX) 40 MG EC tablet Daily.     • potassium chloride 10 MEQ CR tablet Take 10 mEq by mouth Daily.     • Probiotic Product capsule Take 1 tablet by mouth Daily.     • saccharomyces boulardii (FLORASTOR) 250 MG capsule Take 250 mg by mouth Daily.     • Turmeric 500 MG tablet Take 1 tablet by mouth Daily.     • vitamin C (ASCORBIC ACID) 500 MG tablet Take 1,000 mg by mouth Every 12 (Twelve) Hours.     • gabapentin (NEURONTIN) 100 MG capsule        No current facility-administered medications on file prior to visit.       Review of Systems:  General: Denies fever, chills, fatigue, and weakness.  Eyes: Denies vision loss, blurry vision, and excessive redness.  ENT: Denies hearing issues and difficulty swallowing.  Cardiovascular: Denies palpitations, chest pain, or syncopal episodes.  Respiratory: Denies shortness of breath, wheezing, and coughing.  GI: Denies abdominal pain, nausea, and vomiting.   : Denies frequency, hematuria, and urgency.  Musculoskeletal: Denies muscle cramps, joint pains, and stiffness.  Derm: Denies rash, open wounds, or suspicious lesions.  Neuro: Denies headaches, numbness, loss of coordination, and tremors.  Psych: Denies anxiety and depression.  Endocrine: Denies temperature intolerance and changes in appetite.  Heme: Denies bleeding disorders or abnormal bruising.     Objective   /69   Pulse 59   Ht 182.9 cm (72\")   Wt 99.3 kg (219 lb)   BMI 29.70 kg/m²     Foot/Ankle Exam:       General:   Appearance: appears stated age and healthy    Orientation: AAOx3    Affect: appropriate    Gait: unimpaired      VASCULAR      Right Foot Vascularity   Normal vascular exam    Dorsalis pedis:  2+  Posterior tibial:  2+  Skin Temperature: warm    Edema " Grading:  None  CFT:  < 3 seconds  Pedal Hair Growth:  Present  Varicosities: none       Left Foot Vascularity   Normal vascular exam    Dorsalis pedis:  2+  Posterior tibial:  2+  Skin Temperature: warm    Edema Grading:  None  CFT:  < 3 seconds  Pedal Hair Growth:  Present  Varicosities: none        NEUROLOGIC     Right Foot Neurologic   Light touch sensation:  Normal  Hot/Cold sensation: normal    Protective Sensation using [place] Monofilament:  10  Achilles reflex:  2+     Left Foot Neurologic   Light touch sensation:  Normal  Hot/cold sensation: normal    Protective Sensation using [place] Monofilament:  10  Achilles reflex:  2+     MUSCULOSKELETAL      Right Foot Musculoskeletal   Ecchymosis:  None  Tenderness: lesser metatarsophalangeal joints    Arch:  Normal     Left Foot Musculoskeletal   Ecchymosis:  None  Tenderness: lesser metatarsophalangeal joints    Arch:  Normal     MUSCLE STRENGTH     Right Foot Muscle Strength   Normal strength    Foot dorsiflexion:  5  Foot plantar flexion:  5  Foot inversion:  5  Foot eversion:  5     Left Foot Muscle Strength   Normal strength    Foot dorsiflexion:  5  Foot plantar flexion:  5  Foot inversion:  5  Foot eversion:  5     DERMATOLOGIC     Right Foot Dermatologic   Skin: skin intact    Nails: dystrophic nails       Left Foot Dermatologic   Skin: skin intact    Nails: dystrophic nails       TESTS     Right Foot Tests   Anterior drawer: negative    Varus tilt: negative       Left Foot Tests   Anterior drawer: negative    Varus tilt: negative        Right Foot Additional Comments Mild discomfort with palpation to the lesser metatarsophalangeal joints, bilateral. Moderate equinus contracture with knee extended and flexed. No gross deformity or instability. No open wounds or signs of infection      Assessment/Plan   Diagnoses and all orders for this visit:    1. Type 2 diabetes mellitus with hyperglycemia, without long-term current use of insulin  ([facility]) (Primary)    2. Metatarsalgia of both feet    3. Onychodystrophy      Patient presents for routine diabetic foot check. Patient denies any substantial issues involving his feet. He states that he does have mild numbness and tingling to his toes at times. After evaluation, his protective sensation does appear to be appropriate. I explained that his symptoms could be secondary to increased forefoot stress and lack of support. I have asked that he obtain a pair of over-the-counter arch supports with a metatarsal pad for forefoot offloading. We did review the importance of proper diabetic foot care and glycemic control. After chart review, his last A1c in August was 13%. I did discuss the significance of this substantially high number. Patient states that he is working towards managing his blood sugars. I do feel that he is at moderate risk given his A1c. Patient is to return in 6 months for reevaluation with [facility]. He is to call with any new issues or concerns. Greater than 30 minutes was spent before, during, and after evaluation for patient care    No orders of the defined types were placed in this encounter.       Note is dictated utilizing voice recognition software. Unfortunately this leads to occasional typographical errors. I apologize in advance if the situation occurs. If questions occur please do not hesitate to call our office.

## 2021-12-07 RX ORDER — INSULIN LISPRO 100 [IU]/ML
INJECTION, SOLUTION INTRAVENOUS; SUBCUTANEOUS
Qty: 15 ML | Refills: 5 | Status: SHIPPED | OUTPATIENT
Start: 2021-12-07 | End: 2022-04-21

## 2021-12-07 RX ORDER — INSULIN GLARGINE 100 [IU]/ML
INJECTION, SOLUTION SUBCUTANEOUS
Qty: 15 ML | Refills: 5 | Status: SHIPPED | OUTPATIENT
Start: 2021-12-07 | End: 2022-04-21 | Stop reason: SDUPTHER

## 2022-04-12 ENCOUNTER — OFFICE VISIT (OUTPATIENT)
Dept: SLEEP MEDICINE | Facility: CLINIC | Age: 72
End: 2022-04-12

## 2022-04-12 VITALS
WEIGHT: 205 LBS | HEART RATE: 57 BPM | SYSTOLIC BLOOD PRESSURE: 134 MMHG | DIASTOLIC BLOOD PRESSURE: 66 MMHG | BODY MASS INDEX: 27.77 KG/M2 | HEIGHT: 72 IN | OXYGEN SATURATION: 100 %

## 2022-04-12 DIAGNOSIS — Z79.4 TYPE 2 DIABETES MELLITUS WITH HYPERGLYCEMIA, WITH LONG-TERM CURRENT USE OF INSULIN: ICD-10-CM

## 2022-04-12 DIAGNOSIS — Z99.89 OSA ON CPAP: Primary | ICD-10-CM

## 2022-04-12 DIAGNOSIS — G47.33 OSA ON CPAP: Primary | ICD-10-CM

## 2022-04-12 DIAGNOSIS — E11.65 TYPE 2 DIABETES MELLITUS WITH HYPERGLYCEMIA, WITH LONG-TERM CURRENT USE OF INSULIN: ICD-10-CM

## 2022-04-12 PROCEDURE — 99203 OFFICE O/P NEW LOW 30 MIN: CPT | Performed by: INTERNAL MEDICINE

## 2022-04-12 PROCEDURE — G0463 HOSPITAL OUTPT CLINIC VISIT: HCPCS

## 2022-04-12 NOTE — PROGRESS NOTES
[address] [address]  Phone   Fax       Tom Steele  [id number]   1950  71 y.o.  male      PCP:Nellie Adrian MD    Type of service: Initial New Patient Office Visit  Date of service: 4/12/2022    Chief Complaint   Patient presents with   • Sleep Apnea   • Obesity       History of present illness;  Tom Steele 71 y.o.  is a new patient for me and was seen today for management of obstructive sleep apnea.    I have reviewed the split-night study which was done on August 12, 2012 which shows severe obstructive sleep apnea with AHI of 31.7/h and the patient is on auto CPAP.  He is retired from [employer].  He and his wife travels a lot and they do have a RV and have 8 grandchildren.    Patient gives the following sleep history.  Sleep schedule:  Bedtime: 11 PM  Wake time: 8 AM  Normally takes about 15 minutes to fall asleep    The Smart card downloaded on 4/12/2022 has been independently reviewed by me and discussed the data with the patient. It shows the following..  Compliance; 100%  > 4 hr use, 97%  Average use of the device 7 hours and 27 minutes per night  Residual AHI: 4.4 /hr (normal less than 5)  Mask type: Full facemask  Device: REMstar  DME: [employer]    Past medical history: (Relevant to sleep medicine)  1. Obstructive sleep apnea  2. Diabetes mellitus  3. Neuropathy  4. Hypertension  5. Hyperlipidemia    • Medications are reviewed by me and documented in the encounter  • Allergies reviewed and documented in encounter    Social history:  Do you drive a commercial vehicle:  No   Shift work:  No   Tobacco use:  No   Alcohol use:  0 per week  Caffeinated drinks: 1    FAMILY HISTORY (Your mother, father, brothers and sisters)  1. Nothing of significance    REVIEW OF SYSTEMS.  Full review of systems available on the intake form which is scanned in the media tab.  The relevant positive are noted below  1. Daytime  "excessive sleepiness with [place] Sleepiness Scale :Total score: 6   2. Snoring, resolved      Physical exam:  Vitals:    04/12/22 0853   BP: 134/66   BP Location: Left arm   Patient Position: Sitting   Cuff Size: Adult   Pulse: 57   SpO2: 100%   Weight: 93 kg (205 lb)   Height: 182.9 cm (72\")    Body mass index is 27.8 kg/m².    HEENT: Head is atraumatic, normocephalic  Eyes: pupils are round equal and reacting to light and accommodation, conjunctiva normal  Nose: no nasal septal defects or deviation and the nasal passages are clear, no nasal polyps,  Throat: tonsils are not enlarged, tongue normal, oral airway Mallampati class 3  NECK: , trachea is in the midline, thyroid not enlarged  RESPIRATORY SYSTEM: Breath sounds are equal on both sides, there are no wheezes   CARDIOVASULAR SYSTEM: Heart sounds are regular rhythm and esequiel rate, no edema  EXTREMITES: No cyanosis, clubbing  NEUROLOGICAL SYSTEM: Oriented x 3, no gross motor defects, gait normal    Labs reviewed.  TSH Results:  TSH    TSH 8/2/21   TSH 1.850            Most Recent A1C    HGBA1C Most Recent 8/2/21   Hemoglobin A1C 13.0 (A)   (A) Abnormal value               Assessment and plan:  Obstructive sleep apnea ( G 47.33).  The symptoms of sleep apnea have improved with the device and the treatment.  Patient's compliance with the device is excellent for treatment of sleep apnea.  I have independently reviewed the smart card down load and discussed with the patient the download data and encouarged the patient to continue to use the device.The residual AHI is acceptable. The device is benefiting the patient and the device is medically necessary. Without proper control of sleep apnea and good compliance there is a increased risk for hypertension, diabetes mellitus and nonrestorative sleep with hypersomnia which can increase risk for motor vehicle accidents.  Untreated sleep apnea is also a risk factor for development of atrial fibrillation, pulmonary " hypertension and stroke.The patient is also instructed to get the supplies from the DME company and and change them on a regular basis.  A prescription for supplies has been sent to the DME company.  I have also discussed the good sleep hygiene habits and adequate amount of sleep needed for good health.  Discussed the recall and are waiting for the replacement CPAP.  I have reassured the patient to continue the CPAP.  · Hypertension,  Essential hypertension is highly correlated with sleep apnea. Treating sleep apnea will assist in good blood pressure control.  · Diabetes mellitus    I have also discussed with the patient the following  • Sleep hygiene: Maintaining a regular bedtime and wake time, not to watch television or work in bed, limit caffeine-containing beverages before bed time and avoid naps during the day  • Adequate amount of sleep.  Generally most people needs about 7 to 8 hours of sleep.  • Return in about 1 year (around 4/12/2023) for Annual visit with smartcard download..  Patient's questions were answered.        Odin Maldonado MD  Sleep Medicine.  Medical Director, [facility] sleep [facility]  4/12/2022 ,

## 2022-04-13 RX ORDER — NITROGLYCERIN 0.4 MG/1
0.4 TABLET SUBLINGUAL
COMMUNITY
Start: 2021-12-23

## 2022-04-14 ENCOUNTER — LAB (OUTPATIENT)
Dept: LAB | Facility: HOSPITAL | Age: 72
End: 2022-04-14

## 2022-04-14 DIAGNOSIS — E11.65 TYPE 2 DIABETES MELLITUS WITH HYPERGLYCEMIA, WITH LONG-TERM CURRENT USE OF INSULIN: ICD-10-CM

## 2022-04-14 DIAGNOSIS — Z79.4 TYPE 2 DIABETES MELLITUS WITH HYPERGLYCEMIA, WITH LONG-TERM CURRENT USE OF INSULIN: ICD-10-CM

## 2022-04-14 LAB
ALBUMIN SERPL-MCNC: 4.7 G/DL (ref 3.5–5.2)
ALBUMIN UR-MCNC: 10 MG/DL
ALBUMIN/GLOB SERPL: 2.1 G/DL
ALP SERPL-CCNC: 71 U/L (ref 39–117)
ALT SERPL W P-5'-P-CCNC: 21 U/L (ref 1–41)
ANION GAP SERPL CALCULATED.3IONS-SCNC: 10 MMOL/L (ref 5–15)
AST SERPL-CCNC: 20 U/L (ref 1–40)
BILIRUB SERPL-MCNC: 0.4 MG/DL (ref 0–1.2)
BUN SERPL-MCNC: 24 MG/DL (ref 8–23)
BUN/CREAT SERPL: 24.2 (ref 7–25)
CALCIUM SPEC-SCNC: 9.4 MG/DL (ref 8.6–10.5)
CHLORIDE SERPL-SCNC: 102 MMOL/L (ref 98–107)
CHOLEST SERPL-MCNC: 142 MG/DL (ref 0–200)
CO2 SERPL-SCNC: 29 MMOL/L (ref 22–29)
CREAT SERPL-MCNC: 0.99 MG/DL (ref 0.76–1.27)
CREAT UR-MCNC: 93.5 MG/DL
EGFRCR SERPLBLD CKD-EPI 2021: 81.4 ML/MIN/1.73
GLOBULIN UR ELPH-MCNC: 2.2 GM/DL
GLUCOSE SERPL-MCNC: 92 MG/DL (ref 65–99)
HBA1C MFR BLD: 5.5 % (ref 3.5–5.6)
HDLC SERPL-MCNC: 36 MG/DL (ref 40–60)
LDLC SERPL CALC-MCNC: 89 MG/DL (ref 0–100)
LDLC/HDLC SERPL: 2.46 {RATIO}
MICROALBUMIN/CREAT UR: 107 MG/G
POTASSIUM SERPL-SCNC: 4.2 MMOL/L (ref 3.5–5.2)
PROT SERPL-MCNC: 6.9 G/DL (ref 6–8.5)
SODIUM SERPL-SCNC: 141 MMOL/L (ref 136–145)
TRIGL SERPL-MCNC: 88 MG/DL (ref 0–150)
VLDLC SERPL-MCNC: 17 MG/DL (ref 5–40)

## 2022-04-14 PROCEDURE — 83036 HEMOGLOBIN GLYCOSYLATED A1C: CPT

## 2022-04-14 PROCEDURE — 36415 COLL VENOUS BLD VENIPUNCTURE: CPT

## 2022-04-14 PROCEDURE — 80053 COMPREHEN METABOLIC PANEL: CPT

## 2022-04-14 PROCEDURE — 80061 LIPID PANEL: CPT

## 2022-04-14 PROCEDURE — 82570 ASSAY OF URINE CREATININE: CPT

## 2022-04-14 PROCEDURE — 82043 UR ALBUMIN QUANTITATIVE: CPT

## 2022-04-21 ENCOUNTER — TELEPHONE (OUTPATIENT)
Dept: ENDOCRINOLOGY | Facility: CLINIC | Age: 72
End: 2022-04-21

## 2022-04-21 ENCOUNTER — OFFICE VISIT (OUTPATIENT)
Dept: ENDOCRINOLOGY | Facility: CLINIC | Age: 72
End: 2022-04-21

## 2022-04-21 VITALS
HEIGHT: 72 IN | HEART RATE: 62 BPM | OXYGEN SATURATION: 99 % | BODY MASS INDEX: 28.44 KG/M2 | DIASTOLIC BLOOD PRESSURE: 80 MMHG | TEMPERATURE: 97.3 F | SYSTOLIC BLOOD PRESSURE: 145 MMHG | WEIGHT: 210 LBS

## 2022-04-21 DIAGNOSIS — E11.65 TYPE 2 DIABETES MELLITUS WITH HYPERGLYCEMIA, WITH LONG-TERM CURRENT USE OF INSULIN: Primary | ICD-10-CM

## 2022-04-21 DIAGNOSIS — I10 ESSENTIAL HYPERTENSION: ICD-10-CM

## 2022-04-21 DIAGNOSIS — E78.2 MIXED HYPERLIPIDEMIA: ICD-10-CM

## 2022-04-21 DIAGNOSIS — Z79.4 TYPE 2 DIABETES MELLITUS WITH HYPERGLYCEMIA, WITH LONG-TERM CURRENT USE OF INSULIN: Primary | ICD-10-CM

## 2022-04-21 PROBLEM — R94.39 CARDIOVASCULAR STRESS TEST ABNORMAL: Status: ACTIVE | Noted: 2021-12-21

## 2022-04-21 LAB — GLUCOSE BLDC GLUCOMTR-MCNC: 71 MG/DL (ref 70–105)

## 2022-04-21 PROCEDURE — 99214 OFFICE O/P EST MOD 30 MIN: CPT | Performed by: INTERNAL MEDICINE

## 2022-04-21 PROCEDURE — 82962 GLUCOSE BLOOD TEST: CPT | Performed by: INTERNAL MEDICINE

## 2022-04-21 RX ORDER — ATORVASTATIN CALCIUM 40 MG/1
40 TABLET, FILM COATED ORAL DAILY
Qty: 90 TABLET | Refills: 4 | Status: SHIPPED | OUTPATIENT
Start: 2022-04-21

## 2022-04-21 RX ORDER — INSULIN GLARGINE 100 [IU]/ML
30 INJECTION, SOLUTION SUBCUTANEOUS NIGHTLY
Qty: 15 ML | Refills: 5 | Status: SHIPPED | OUTPATIENT
Start: 2022-04-21 | End: 2022-10-03

## 2022-04-21 RX ORDER — SEMAGLUTIDE 1.34 MG/ML
INJECTION, SOLUTION SUBCUTANEOUS
Qty: 2 ML | Refills: 6 | Status: SHIPPED | OUTPATIENT
Start: 2022-04-21 | End: 2022-10-25

## 2022-04-21 RX ORDER — BENAZEPRIL HYDROCHLORIDE 20 MG/1
20 TABLET ORAL DAILY
Qty: 90 TABLET | Refills: 4 | Status: SHIPPED | OUTPATIENT
Start: 2022-04-21 | End: 2023-04-06

## 2022-04-21 NOTE — PATIENT INSTRUCTIONS
SHARI Humalog  Start Ozempic 0.25 mg subcu weekly for 4 weeks and if you are able to tolerate then increase the dose to 0.5 mg subcu weekly  Continue Jardiance and Lant  Continue to work on your diet and activity  Labs before follow-up  Always keep glucose source in case of low blood sugar  Annual eye exam and flu vaccine.

## 2022-04-21 NOTE — TELEPHONE ENCOUNTER
Called pt and explained that Medicare recently changed insulin requirement and he may be eligible.  Explained that he would have to go through [employer].  Pt decided to not proceed with trying to get the Joann at this time but knows if he changes his mind, he can call us back.

## 2022-04-21 NOTE — PROGRESS NOTES
Endocrine Progress Note Outpatient     Patient Care Team:  Nellie Adrian MD as PCP - General (Family Medicine)    Chief Complaint: Follow-up type 2 diabetes          HPI: 71-year-old male with type 2 diabetes, hypertension, hyperlipidemia CAD is here for follow-up.    For type 2 diabetes: Diagnosed in August 2021, currently on Lantus 30 units daily with Humalog 10 with each meal along with Jardiance 10 mg once a day.  He is tolerating them well, he did bring in blood sugar records for review and mostly running between 80-1 20.  He has completed diabetes education.  Hypertension: Fair control  Hyperlipidemia: Currently on atorvastatin  CAD: Status post stent placement.    Past Medical History:   Diagnosis Date   • Allergic rhinitis    • Degenerative joint disease    • FH: cholecystectomy    • Hypercholesteremia    • Hypertension    • NEREIDA (obstructive sleep apnea)    • Type 2 diabetes mellitus (HCC)        Social History     Socioeconomic History   • Marital status:      Spouse name: Colette   • Number of children: 4   • Years of education: 14   Tobacco Use   • Smoking status: Never Smoker   • Smokeless tobacco: Never Used   Vaping Use   • Vaping Use: Never used   Substance and Sexual Activity   • Alcohol use: Not Currently   • Drug use: Never   • Sexual activity: Defer     Partners: Female       Family History   Problem Relation Age of Onset   • Heart disease Mother    • Heart disease Father    • Diabetes Sister    • Hypertension Sister    • Thyroid disease Sister        No Known Allergies    ROS:   Constitutional:  Denies fatigue, tiredness.    Eyes:  Denies change in visual acuity   HENT:  Denies nasal congestion or sore throat   Respiratory: denies cough, shortness of breath.   Cardiovascular:  denies chest pain, edema   GI:  Denies abdominal pain, nausea, vomiting.   Musculoskeletal:  Denies back pain or joint pain   Integument:  Denies dry skin and rash   Neurologic:  Denies headache, focal weakness  or sensory changes   Endocrine:  Denies polyuria or polydipsia   Psychiatric:  Denies depression or anxiety      Vitals:    04/21/22 1306   BP: 145/80   Pulse: 62   Temp: 97.3 °F (36.3 °C)   SpO2: 99%     Body mass index is 28.48 kg/m².     Physical Exam:  GEN: NAD, conversant  EYES: EOMI, PERRL, no conjunctival erythema  NECK: no thyromegaly, full ROM   CV: RRR, no murmurs/rubs/gallops, no peripheral edema  LUNG: CTAB, no wheezes/rales/ronchi  SKIN: no rashes, no acanthosis  MSK: no deformities, full ROM of all extremities  NEURO: no tremors, DTR normal  PSYCH: AOX3, appropriate mood, affect normal      Results Review:     I reviewed the patient's new clinical results.    Lab Results   Component Value Date    HGBA1C 5.5 04/14/2022    HGBA1C 13.0 (H) 08/02/2021      Lab Results   Component Value Date    GLUCOSE 92 04/14/2022    BUN 24 (H) 04/14/2022    CREATININE 0.99 04/14/2022    EGFRIFNONA 71 08/03/2021    BCR 24.2 04/14/2022    K 4.2 04/14/2022    CO2 29.0 04/14/2022    CALCIUM 9.4 04/14/2022    ALBUMIN 4.70 04/14/2022    AST 20 04/14/2022    ALT 21 04/14/2022    CHOL 142 04/14/2022    TRIG 88 04/14/2022    LDL 89 04/14/2022    HDL 36 (L) 04/14/2022     Lab Results   Component Value Date    TSH 1.850 08/02/2021         Medication Review: Reviewed.       Current Outpatient Medications:   •  Accu-Chek Softclix Lancets lancets, Use to test blood sugar 3 times daily. Dx: E11.65, Disp: 100 each, Rfl: 12  •  amLODIPine-benazepril (LOTREL 5-20) 5-20 MG per capsule, Take 1 capsule by mouth Daily., Disp: , Rfl:   •  aspirin 81 MG tablet, Take 81 mg by mouth Daily., Disp: , Rfl:   •  atorvastatin (LIPITOR) 40 MG tablet, Take 40 mg by mouth Daily., Disp: , Rfl:   •  benazepril (LOTENSIN) 20 MG tablet, Take 20 mg by mouth Daily., Disp: , Rfl:   •  Biotin 40581 MCG tablet, Take 1 tablet by mouth Daily., Disp: , Rfl:   •  Cetirizine HCl 10 MG tablet dispersible, 2 (two) times a day., Disp: , Rfl:   •  Cholecalciferol 50 MCG  (2000 UT) capsule, Take 2,000 Units by mouth Daily., Disp: , Rfl:   •  Chromium-Cinnamon  MCG-MG capsule, Take 1 capsule by mouth 2 (two) times a day., Disp: , Rfl:   •  Cinnamon 500 MG capsule, Take  by mouth., Disp: , Rfl:   •  Cobalamine Combinations (VITAMIN B12-FOLIC ACID) 500-400 MCG tablet, Take 1 tablet by mouth Daily., Disp: , Rfl:   •  Coenzyme Q10 (COQ-10) 200 MG capsule, Take 1 capsule by mouth Daily., Disp: , Rfl:   •  Cyanocobalamin (Vitamin B 12) 500 MCG tablet, Take 500 mcg by mouth Daily., Disp: , Rfl:   •  empagliflozin (Jardiance) 10 MG tablet tablet, Take 1 tablet by mouth Daily., Disp: 30 tablet, Rfl: 6  •  gabapentin (NEURONTIN) 100 MG capsule, , Disp: , Rfl:   •  Glucosamine-Chondroit-Vit C-Mn (GLUCOSAMINE CHONDR 1500 COMPLX PO), Take 1 capsule by mouth Daily., Disp: , Rfl:   •  glucose blood (Accu-Chek Guide) test strip, Use to test blood sugar 3 times daily. Dx: E11.65, Disp: 100 each, Rfl: 12  •  HumaLOG KwikPen 100 UNIT/ML solution pen-injector, INJECT 10 UNITS UNDER THE  SKIN INTO THE APPROPRIATE  AREA AS DIRECTED 3 TIMES A DAY WITH MEALS, Disp: 15 mL, Rfl: 5  •  hydroCHLOROthiazide (HYDRODIURIL) 25 MG tablet, Take 25 mg by mouth Daily., Disp: , Rfl:   •  Insulin Pen Needle (Unifine Pentips) 32G X 4 MM misc, Use to inject insulin 4 times daily. Dx: E11.65, Disp: 200 each, Rfl: 12  •  Lantus SoloStar 100 UNIT/ML injection pen, INJECT 30 UNITS UNDER THE  SKIN INTO THE APPROPRIATE  AREA AS DIRECTED DAILY WITHBREAKFAST, Disp: 15 mL, Rfl: 5  •  meloxicam (MOBIC) 15 MG tablet, Daily., Disp: , Rfl:   •  metoprolol succinate XL (TOPROL-XL) 50 MG 24 hr tablet, Take 50 mg by mouth Daily., Disp: , Rfl:   •  Multiple Vitamin (MULTIVITAMIN) capsule, Take 1 capsule by mouth Daily., Disp: , Rfl:   •  nitroglycerin (NITROSTAT) 0.4 MG SL tablet, Place 1 tablet under the tongue Every 5 (Five) Minutes As Needed for Chest Pain. Take no more than 3 doses in 15 minutes., Disp: 25 tablet, Rfl: 12  •   nitroglycerin (NITROSTAT) 0.4 MG SL tablet, Place 0.4 mg under the tongue., Disp: , Rfl:   •  pantoprazole (PROTONIX) 40 MG EC tablet, Daily., Disp: , Rfl:   •  potassium chloride 10 MEQ CR tablet, Take 10 mEq by mouth Daily., Disp: , Rfl:   •  Probiotic Product capsule, Take 1 tablet by mouth Daily., Disp: , Rfl:   •  saccharomyces boulardii (FLORASTOR) 250 MG capsule, Take 250 mg by mouth Daily., Disp: , Rfl:   •  Turmeric 500 MG tablet, Take 1 tablet by mouth Daily., Disp: , Rfl:   •  vitamin C (ASCORBIC ACID) 500 MG tablet, Take 1,000 mg by mouth Every 12 (Twelve) Hours., Disp: , Rfl:       Assessment/Plan   Diabetes mellitus type 2: Excellent control with A1c now normal at 5.5%.  He has lost about 10 pounds of weight.  At this time I have decided to DC Humalog and add Ozempic 0.25 mg subcu weekly for 4 weeks and if he is able to tolerate without any abdominal pain, nausea or vomiting then we will increase the dose to 0.50 mg subcu weekly.  He will continue Jardiance with Lantus.  We will follow blood sugars and A1c.    Hypertension: Fair control, continue current medication    Hyperlipidemia: Well-controlled, continue current medications.            Bryce Anderson MD FACE.

## 2022-06-03 ENCOUNTER — OFFICE VISIT (OUTPATIENT)
Dept: PODIATRY | Facility: CLINIC | Age: 72
End: 2022-06-03

## 2022-06-03 VITALS
HEART RATE: 66 BPM | HEIGHT: 72 IN | DIASTOLIC BLOOD PRESSURE: 68 MMHG | SYSTOLIC BLOOD PRESSURE: 135 MMHG | WEIGHT: 210 LBS | BODY MASS INDEX: 28.44 KG/M2

## 2022-06-03 DIAGNOSIS — M77.41 METATARSALGIA OF BOTH FEET: ICD-10-CM

## 2022-06-03 DIAGNOSIS — E11.42 DIABETIC PERIPHERAL NEUROPATHY ASSOCIATED WITH TYPE 2 DIABETES MELLITUS: ICD-10-CM

## 2022-06-03 DIAGNOSIS — B35.1 ONYCHOMYCOSIS: ICD-10-CM

## 2022-06-03 DIAGNOSIS — L84 CALLUS OF FOOT: ICD-10-CM

## 2022-06-03 DIAGNOSIS — E11.65 TYPE 2 DIABETES MELLITUS WITH HYPERGLYCEMIA, WITHOUT LONG-TERM CURRENT USE OF INSULIN: Primary | ICD-10-CM

## 2022-06-03 DIAGNOSIS — M77.42 METATARSALGIA OF BOTH FEET: ICD-10-CM

## 2022-06-03 PROCEDURE — 99212 OFFICE O/P EST SF 10 MIN: CPT

## 2022-06-03 PROCEDURE — 11721 DEBRIDE NAIL 6 OR MORE: CPT

## 2022-06-03 NOTE — PROGRESS NOTES
06/03/2022  Foot and Ankle Surgery - Established Patient/Follow-up  Provider: TOÑITO Monaco   Location: [facility]    Subjective:  Tom Steele is a 71 y.o. male.     Chief Complaint   Patient presents with   • Right Foot - Follow-up     Diabetic foot eval   • Left Foot - Follow-up     Diabetic foot eval   • Follow-up     Last Visit PCP Dr. NIKO Adrian 04/2022       HPI: Patient presents office today for routine diabetic foot check.  He was previously seen by Dr. Luna approximately 6 months ago and treated Luden for bilateral metatarsalgia with over-the-counter arch supports with metatarsal pad.  Patient reports his bilateral foot pain is doing much better.  He reports he still continues to have some numbness and tingling to the left foot and cramping to the calf but it is also better.  Patient reports his blood sugars have been well controlled and that his last A1c was below 6%.  Patient denies any new issues or problems with his feet today.  Patient is requesting nail debridement today as his nails are too thick and it is difficult for him to bend to cut them safely.    No Known Allergies    Current Outpatient Medications on File Prior to Visit   Medication Sig Dispense Refill   • Accu-Chek Softclix Lancets lancets Use to test blood sugar 3 times daily. Dx: E11.65 100 each 12   • aspirin 81 MG tablet Take 81 mg by mouth Daily.     • atorvastatin (LIPITOR) 40 MG tablet Take 1 tablet by mouth Daily. 90 tablet 4   • benazepril (LOTENSIN) 20 MG tablet Take 1 tablet by mouth Daily. 90 tablet 4   • Biotin 37126 MCG tablet Take 1 tablet by mouth Daily.     • Cetirizine HCl 10 MG tablet dispersible 2 (two) times a day.     • Cholecalciferol 50 MCG (2000 UT) capsule Take 2,000 Units by mouth Daily.     • Chromium-Cinnamon  MCG-MG capsule Take 1 capsule by mouth 2 (two) times a day.     • Cinnamon 500 MG capsule Take  by mouth.     • Cobalamine Combinations (VITAMIN B12-FOLIC ACID) 500-400 MCG  tablet Take 1 tablet by mouth Daily.     • Coenzyme Q10 (COQ-10) 200 MG capsule Take 1 capsule by mouth Daily.     • Cyanocobalamin (Vitamin B 12) 500 MCG tablet Take 500 mcg by mouth Daily.     • empagliflozin (Jardiance) 10 MG tablet tablet Take 1 tablet by mouth Daily. 90 tablet 4   • gabapentin (NEURONTIN) 100 MG capsule      • Glucosamine-Chondroit-Vit C-Mn (GLUCOSAMINE CHONDR 1500 COMPLX PO) Take 1 capsule by mouth Daily.     • glucose blood (Accu-Chek Guide) test strip Use to test blood sugar 3 times daily. Dx: E11.65 100 each 12   • hydroCHLOROthiazide (HYDRODIURIL) 25 MG tablet Take 25 mg by mouth Daily.     • Insulin Glargine (Lantus SoloStar) 100 UNIT/ML injection pen Inject 30 Units under the skin into the appropriate area as directed Every Night. 15 mL 5   • Insulin Pen Needle (Unifine Pentips) 32G X 4 MM misc Use to inject insulin 4 times daily. Dx: E11.65 200 each 12   • meloxicam (MOBIC) 15 MG tablet Daily.     • metoprolol succinate XL (TOPROL-XL) 50 MG 24 hr tablet Take 50 mg by mouth Daily.     • Multiple Vitamin (MULTIVITAMIN) capsule Take 1 capsule by mouth Daily.     • nitroglycerin (NITROSTAT) 0.4 MG SL tablet Place 1 tablet under the tongue Every 5 (Five) Minutes As Needed for Chest Pain. Take no more than 3 doses in 15 minutes. 25 tablet 12   • nitroglycerin (NITROSTAT) 0.4 MG SL tablet Place 0.4 mg under the tongue.     • pantoprazole (PROTONIX) 40 MG EC tablet Daily.     • potassium chloride 10 MEQ CR tablet Take 10 mEq by mouth Daily.     • Probiotic Product capsule Take 1 tablet by mouth Daily.     • saccharomyces boulardii (FLORASTOR) 250 MG capsule Take 250 mg by mouth Daily.     • Semaglutide,0.25 or 0.5MG/DOS, (Ozempic, 0.25 or 0.5 MG/DOSE,) 2 MG/1.5ML solution pen-injector 0.25 mg subcu weekly for 4 weeks then increase to 0.5 mg subcu weekly if tolerated. 2 mL 6   • Turmeric 500 MG tablet Take 1 tablet by mouth Daily.     • vitamin C (ASCORBIC ACID) 500 MG tablet Take 1,000 mg by  "mouth Every 12 (Twelve) Hours.       No current facility-administered medications on file prior to visit.       Objective   /68   Pulse 66   Ht 182.9 cm (72\")   Wt 95.3 kg (210 lb)   BMI 28.48 kg/m²     Foot/Ankle Exam:       General:   Appearance: appears stated age and healthy    Orientation: AAOx3    Affect: appropriate    Gait: unimpaired    Assistance: independent    Shoe Gear:  Casual shoes and socks    VASCULAR      Right Foot Vascularity   Dorsalis pedis:  2+  Skin Temperature: warm    Edema Grading:  None  CFT:  < 3 seconds  Pedal Hair Growth:  Present  Varicosities: none       Left Foot Vascularity   Dorsalis pedis:  2+  Skin Temperature: warm    Edema Grading:  None  CFT:  < 3 seconds  Pedal Hair Growth:  Present  Varicosities: none        NEUROLOGIC     Right Foot Neurologic   Light touch sensation:  Normal  Protective Sensation using [place] Monofilament:  7     Left Foot Neurologic   Light touch sensation:  Normal  Protective Sensation using [place] Monofilament:  5     MUSCULOSKELETAL      Right Foot Musculoskeletal   Ecchymosis:  None  Tenderness: none       Left Foot Musculoskeletal   Ecchymosis:  None  Tenderness: none       DERMATOLOGIC     Right Foot Dermatologic   Skin: maceration    Nails: onychomycosis, abnormally thick and dystrophic nails       Left Foot Dermatologic   Skin: skin intact    Nails: onychomycosis, abnormally thick and dystrophic nails       Image:       Assessment & Plan   Diagnoses and all orders for this visit:    1. Type 2 diabetes mellitus with hyperglycemia, without long-term current use of insulin (HCC) (Primary)    2. Metatarsalgia of both feet    3. Onychomycosis    4. Diabetic peripheral neuropathy associated with type 2 diabetes mellitus (HCC)      On exam patient's bilateral feet do appear stable with no open wounds or signs of active acute infection.  Patient does have thickened, discolored, dystrophic, fungal appearing nails which were " debrided today without complication.  Patient does have mild callusing to the right great toe.  Patient has mild maceration between the fourth and fifth toes reviewed the importance of drying feet well and keeping feet clean and dry.  Patient has decreased protective sensation with monofilament testing.  Do feel patient is at moderate risk for pedal complications due to diabetes and this was discussed with patient today.Explained importance of diabetic foot care, daily foot checks, and glycemic control. Patient should check both feet on a daily basis, monitor and control blood sugars, make sure that both feet and in between toes are towel dried after baths or showers. Avoid barefoot walking at all times. Check shoes before putting them on.   Patient was given information on proper foot care. Call the office at the first signs of a wound or with signs of infection.    Nail debridement: Both feet x10    Consent and time out was performed before proceeding with the procedure. Nails were debrided with a nail nipper without complication.  No anesthesia was required.  Indications for procedure were thickened, dystrophic, and fungal appearing nails which are difficult to trim.  Proper self-care and technique was discussed with patient.  Patient was stable after procedure.    Would like for patient to continue to monitor feet closely and call with any questions or concerns should they arise before scheduled appointment.  Would like for patient to follow-up in 6 months for routine diabetic foot check.  Patient verbalized understanding and is agreeable to plan at this time.    No orders of the defined types were placed in this encounter.         Note is dictated utilizing voice recognition software. Unfortunately this leads to occasional typographical errors. I apologize in advance if the situation occurs. If questions occur please do not hesitate to call our office.

## 2022-10-03 RX ORDER — INSULIN GLARGINE 100 [IU]/ML
INJECTION, SOLUTION SUBCUTANEOUS
Qty: 15 ML | Refills: 1 | Status: SHIPPED | OUTPATIENT
Start: 2022-10-03 | End: 2022-10-25 | Stop reason: SDUPTHER

## 2022-10-13 ENCOUNTER — LAB (OUTPATIENT)
Dept: LAB | Facility: HOSPITAL | Age: 72
End: 2022-10-13

## 2022-10-13 DIAGNOSIS — Z79.4 TYPE 2 DIABETES MELLITUS WITH HYPERGLYCEMIA, WITH LONG-TERM CURRENT USE OF INSULIN: ICD-10-CM

## 2022-10-13 DIAGNOSIS — E11.65 TYPE 2 DIABETES MELLITUS WITH HYPERGLYCEMIA, WITH LONG-TERM CURRENT USE OF INSULIN: ICD-10-CM

## 2022-10-13 LAB
ALBUMIN SERPL-MCNC: 4.7 G/DL (ref 3.5–5.2)
ALBUMIN UR-MCNC: 16 MG/DL
ALBUMIN/GLOB SERPL: 2 G/DL
ALP SERPL-CCNC: 63 U/L (ref 39–117)
ALT SERPL W P-5'-P-CCNC: 20 U/L (ref 1–41)
ANION GAP SERPL CALCULATED.3IONS-SCNC: 9.6 MMOL/L (ref 5–15)
AST SERPL-CCNC: 19 U/L (ref 1–40)
BILIRUB SERPL-MCNC: 0.5 MG/DL (ref 0–1.2)
BUN SERPL-MCNC: 31 MG/DL (ref 8–23)
BUN/CREAT SERPL: 31.6 (ref 7–25)
CALCIUM SPEC-SCNC: 9.3 MG/DL (ref 8.6–10.5)
CHLORIDE SERPL-SCNC: 102 MMOL/L (ref 98–107)
CHOLEST SERPL-MCNC: 123 MG/DL (ref 0–200)
CO2 SERPL-SCNC: 30.4 MMOL/L (ref 22–29)
CREAT SERPL-MCNC: 0.98 MG/DL (ref 0.76–1.27)
CREAT UR-MCNC: 153.5 MG/DL
EGFRCR SERPLBLD CKD-EPI 2021: 82.4 ML/MIN/1.73
GLOBULIN UR ELPH-MCNC: 2.3 GM/DL
GLUCOSE SERPL-MCNC: 94 MG/DL (ref 65–99)
HBA1C MFR BLD: 5.4 % (ref 3.5–5.6)
HDLC SERPL-MCNC: 34 MG/DL (ref 40–60)
LDLC SERPL CALC-MCNC: 66 MG/DL (ref 0–100)
LDLC/HDLC SERPL: 1.88 {RATIO}
MICROALBUMIN/CREAT UR: 104.2 MG/G
POTASSIUM SERPL-SCNC: 4.6 MMOL/L (ref 3.5–5.2)
PROT SERPL-MCNC: 7 G/DL (ref 6–8.5)
SODIUM SERPL-SCNC: 142 MMOL/L (ref 136–145)
TRIGL SERPL-MCNC: 126 MG/DL (ref 0–150)
VLDLC SERPL-MCNC: 23 MG/DL (ref 5–40)

## 2022-10-13 PROCEDURE — 82043 UR ALBUMIN QUANTITATIVE: CPT

## 2022-10-13 PROCEDURE — 82570 ASSAY OF URINE CREATININE: CPT

## 2022-10-13 PROCEDURE — 80053 COMPREHEN METABOLIC PANEL: CPT

## 2022-10-13 PROCEDURE — 36415 COLL VENOUS BLD VENIPUNCTURE: CPT

## 2022-10-13 PROCEDURE — 80061 LIPID PANEL: CPT

## 2022-10-13 PROCEDURE — 83036 HEMOGLOBIN GLYCOSYLATED A1C: CPT

## 2022-10-25 ENCOUNTER — OFFICE VISIT (OUTPATIENT)
Dept: ENDOCRINOLOGY | Facility: CLINIC | Age: 72
End: 2022-10-25

## 2022-10-25 VITALS
WEIGHT: 201 LBS | SYSTOLIC BLOOD PRESSURE: 130 MMHG | HEIGHT: 72 IN | HEART RATE: 74 BPM | OXYGEN SATURATION: 98 % | DIASTOLIC BLOOD PRESSURE: 70 MMHG | BODY MASS INDEX: 27.22 KG/M2

## 2022-10-25 DIAGNOSIS — E11.65 TYPE 2 DIABETES MELLITUS WITH HYPERGLYCEMIA, WITH LONG-TERM CURRENT USE OF INSULIN: Primary | ICD-10-CM

## 2022-10-25 DIAGNOSIS — I10 ESSENTIAL HYPERTENSION: ICD-10-CM

## 2022-10-25 DIAGNOSIS — E78.2 MIXED HYPERLIPIDEMIA: ICD-10-CM

## 2022-10-25 DIAGNOSIS — Z79.4 TYPE 2 DIABETES MELLITUS WITH HYPERGLYCEMIA, WITH LONG-TERM CURRENT USE OF INSULIN: Primary | ICD-10-CM

## 2022-10-25 PROBLEM — H26.493 BILATERAL POSTERIOR CAPSULAR OPACIFICATION: Status: ACTIVE | Noted: 2021-09-09

## 2022-10-25 PROBLEM — H11.159 PINGUECULA: Status: ACTIVE | Noted: 2019-04-05

## 2022-10-25 PROBLEM — Z96.1 BILATERAL PSEUDOPHAKIA: Status: ACTIVE | Noted: 2018-04-04

## 2022-10-25 PROBLEM — H26.493 PCO (POSTERIOR CAPSULAR OPACIFICATION), BILATERAL: Status: ACTIVE | Noted: 2017-04-04

## 2022-10-25 PROBLEM — H43.819 VITREOUS DEGENERATION: Status: ACTIVE | Noted: 2018-04-04

## 2022-10-25 LAB — GLUCOSE BLDC GLUCOMTR-MCNC: 88 MG/DL (ref 70–105)

## 2022-10-25 PROCEDURE — 99214 OFFICE O/P EST MOD 30 MIN: CPT | Performed by: INTERNAL MEDICINE

## 2022-10-25 PROCEDURE — 82962 GLUCOSE BLOOD TEST: CPT | Performed by: INTERNAL MEDICINE

## 2022-10-25 RX ORDER — SEMAGLUTIDE 1.34 MG/ML
1 INJECTION, SOLUTION SUBCUTANEOUS WEEKLY
Qty: 3 ML | Refills: 6 | Status: SHIPPED | OUTPATIENT
Start: 2022-10-25

## 2022-10-25 RX ORDER — INSULIN GLARGINE 100 [IU]/ML
INJECTION, SOLUTION SUBCUTANEOUS
Qty: 15 ML | Refills: 5 | Status: SHIPPED | OUTPATIENT
Start: 2022-10-25 | End: 2023-01-05

## 2022-10-25 NOTE — PATIENT INSTRUCTIONS
Increase Ozempic to 1 mg subcu weekly  Decrease Lantus to 15 units subcu daily  Continue Jardiance  Continue to work on your diet and activity  Annual eye exam  Labs before follow-up.

## 2022-10-25 NOTE — PROGRESS NOTES
Endocrine Progress Note Outpatient     Patient Care Team:  Nellie Adrian MD as PCP - General (Family Medicine)    Chief Complaint: Follow-up type 2 diabetes    HPI: 71-year-old male with type 2 diabetes, hypertension, hyperlipidemia CAD is here for follow-up.    For type 2 diabetes: Diagnosed in August 2021, currently on Lantus 30 units daily with Ozempic 0.5 mg subcu weekly and Jardiance 10 mg once a day.  He is tolerating them well, he did not bring in blood sugar records for review, but mostly running between 80-1 20.  He has completed diabetes education.  Hypertension: Fair control  Hyperlipidemia: Currently on atorvastatin  CAD: Status post stent placement.    Past Medical History:   Diagnosis Date   • Allergic rhinitis    • Degenerative joint disease    • FH: cholecystectomy    • Hypercholesteremia    • Hypertension    • NEREIDA (obstructive sleep apnea)    • Type 2 diabetes mellitus (HCC)        Social History     Socioeconomic History   • Marital status:      Spouse name: Colette   • Number of children: 4   • Years of education: 14   Tobacco Use   • Smoking status: Never   • Smokeless tobacco: Never   Vaping Use   • Vaping Use: Never used   Substance and Sexual Activity   • Alcohol use: Not Currently   • Drug use: Never   • Sexual activity: Defer     Partners: Female       Family History   Problem Relation Age of Onset   • Heart disease Mother    • Heart disease Father    • Diabetes Sister    • Hypertension Sister    • Thyroid disease Sister        No Known Allergies    ROS:   Constitutional:  Denies fatigue, tiredness.    Eyes:  Denies change in visual acuity   HENT:  Denies nasal congestion or sore throat   Respiratory: denies cough, shortness of breath.   Cardiovascular:  denies chest pain, edema   GI:  Denies abdominal pain, nausea, vomiting.   Musculoskeletal:  Denies back pain or joint pain   Integument:  Denies dry skin and rash   Neurologic:  Denies headache, focal weakness or sensory changes    Endocrine:  Denies polyuria or polydipsia   Psychiatric:  Denies depression or anxiety      Vitals:    10/25/22 1305   BP: 140/75   Pulse: 74   SpO2: 98%     Body mass index is 27.26 kg/m².     Physical Exam:  GEN: NAD, conversant  EYES: EOMI, PERRL, no conjunctival erythema  NECK: no thyromegaly, full ROM   CV: RRR, no murmurs/rubs/gallops, no peripheral edema  LUNG: CTAB, no wheezes/rales/ronchi  SKIN: no rashes, no acanthosis  MSK: no deformities, full ROM of all extremities  NEURO: no tremors, DTR normal  PSYCH: AOX3, appropriate mood, affect normal      Results Review:     I reviewed the patient's new clinical results.    Lab Results   Component Value Date    HGBA1C 5.4 10/13/2022    HGBA1C 5.5 04/14/2022    HGBA1C 13.0 (H) 08/02/2021      Lab Results   Component Value Date    GLUCOSE 94 10/13/2022    BUN 31 (H) 10/13/2022    CREATININE 0.98 10/13/2022    EGFRIFNONA 71 08/03/2021    BCR 31.6 (H) 10/13/2022    K 4.6 10/13/2022    CO2 30.4 (H) 10/13/2022    CALCIUM 9.3 10/13/2022    ALBUMIN 4.70 10/13/2022    AST 19 10/13/2022    ALT 20 10/13/2022    CHOL 123 10/13/2022    TRIG 126 10/13/2022    LDL 66 10/13/2022    HDL 34 (L) 10/13/2022     Lab Results   Component Value Date    TSH 1.850 08/02/2021         Medication Review: Reviewed.       Current Outpatient Medications:   •  aspirin 81 MG tablet, Take 81 mg by mouth Daily., Disp: , Rfl:   •  atorvastatin (LIPITOR) 40 MG tablet, Take 1 tablet by mouth Daily., Disp: 90 tablet, Rfl: 4  •  benazepril (LOTENSIN) 20 MG tablet, Take 1 tablet by mouth Daily., Disp: 90 tablet, Rfl: 4  •  Biotin 61285 MCG tablet, Take 1 tablet by mouth Daily., Disp: , Rfl:   •  Cetirizine HCl 10 MG tablet dispersible, 2 (two) times a day., Disp: , Rfl:   •  Cholecalciferol 50 MCG (2000 UT) capsule, Take 2,000 Units by mouth Daily., Disp: , Rfl:   •  Chromium-Cinnamon  MCG-MG capsule, Take 1 capsule by mouth 2 (two) times a day., Disp: , Rfl:   •  Cinnamon 500 MG capsule, Take   by mouth., Disp: , Rfl:   •  Cobalamine Combinations (VITAMIN B12-FOLIC ACID) 500-400 MCG tablet, Take 1 tablet by mouth Daily., Disp: , Rfl:   •  Coenzyme Q10 (COQ-10) 200 MG capsule, Take 1 capsule by mouth Daily., Disp: , Rfl:   •  Cyanocobalamin (Vitamin B 12) 500 MCG tablet, Take 500 mcg by mouth Daily., Disp: , Rfl:   •  empagliflozin (Jardiance) 10 MG tablet tablet, Take 1 tablet by mouth Daily., Disp: 90 tablet, Rfl: 4  •  gabapentin (NEURONTIN) 100 MG capsule, , Disp: , Rfl:   •  Glucosamine-Chondroit-Vit C-Mn (GLUCOSAMINE CHONDR 1500 COMPLX PO), Take 1 capsule by mouth Daily., Disp: , Rfl:   •  glucose blood (Accu-Chek Guide) test strip, Use to test blood sugar 3 times daily. Dx: E11.65, Disp: 100 each, Rfl: 12  •  hydroCHLOROthiazide (HYDRODIURIL) 25 MG tablet, Take 25 mg by mouth Daily., Disp: , Rfl:   •  Insulin Pen Needle (Unifine Pentips) 32G X 4 MM misc, Use to inject insulin 4 times daily. Dx: E11.65, Disp: 200 each, Rfl: 12  •  Lantus SoloStar 100 UNIT/ML injection pen, INJECT 30 UNITS UNDER THE  SKIN INTO THE APPROPRIATE  AREA AS DIRECTED DAILY WITHBREAKFAST, Disp: 15 mL, Rfl: 1  •  meloxicam (MOBIC) 15 MG tablet, Daily., Disp: , Rfl:   •  metoprolol succinate XL (TOPROL-XL) 50 MG 24 hr tablet, Take 50 mg by mouth Daily., Disp: , Rfl:   •  Multiple Vitamin (MULTIVITAMIN) capsule, Take 1 capsule by mouth Daily., Disp: , Rfl:   •  nitroglycerin (NITROSTAT) 0.4 MG SL tablet, Place 1 tablet under the tongue Every 5 (Five) Minutes As Needed for Chest Pain. Take no more than 3 doses in 15 minutes., Disp: 25 tablet, Rfl: 12  •  nitroglycerin (NITROSTAT) 0.4 MG SL tablet, Place 0.4 mg under the tongue., Disp: , Rfl:   •  pantoprazole (PROTONIX) 40 MG EC tablet, Daily., Disp: , Rfl:   •  potassium chloride 10 MEQ CR tablet, Take 10 mEq by mouth Daily., Disp: , Rfl:   •  Probiotic Product capsule, Take 1 tablet by mouth Daily., Disp: , Rfl:   •  saccharomyces boulardii (FLORASTOR) 250 MG capsule, Take 250  mg by mouth Daily., Disp: , Rfl:   •  Semaglutide,0.25 or 0.5MG/DOS, (Ozempic, 0.25 or 0.5 MG/DOSE,) 2 MG/1.5ML solution pen-injector, 0.25 mg subcu weekly for 4 weeks then increase to 0.5 mg subcu weekly if tolerated., Disp: 2 mL, Rfl: 6  •  Turmeric 500 MG tablet, Take 1 tablet by mouth Daily., Disp: , Rfl:   •  vitamin C (ASCORBIC ACID) 500 MG tablet, Take 1,000 mg by mouth Every 12 (Twelve) Hours., Disp: , Rfl:       Assessment & Plan   Diabetes mellitus type 2 with hyperglycemia: Well-controlled with normal A1c.  At this time I will increase Ozempic to 1 mg once a week and decrease Lantus to 15 units subcu daily and continue Jardiance.  Continue to follow blood sugars and A1c.  Continue to work on diet and activity.     Hypertension: Fair control, continue current medication    Hyperlipidemia: Well-controlled, continue current medications.    Assessment and plan from April 21, 2022 reviewed and updated.            Bryce Anderson MD FACE.

## 2022-10-27 DIAGNOSIS — E11.65 TYPE 2 DIABETES MELLITUS WITH HYPERGLYCEMIA, WITH LONG-TERM CURRENT USE OF INSULIN: ICD-10-CM

## 2022-10-27 DIAGNOSIS — Z79.4 TYPE 2 DIABETES MELLITUS WITH HYPERGLYCEMIA, WITH LONG-TERM CURRENT USE OF INSULIN: ICD-10-CM

## 2022-10-27 RX ORDER — PEN NEEDLE, DIABETIC 32GX 5/32"
NEEDLE, DISPOSABLE MISCELLANEOUS
Qty: 400 EACH | Refills: 3 | Status: SHIPPED | OUTPATIENT
Start: 2022-10-27

## 2022-12-05 ENCOUNTER — OFFICE VISIT (OUTPATIENT)
Dept: PODIATRY | Facility: CLINIC | Age: 72
End: 2022-12-05

## 2022-12-05 VITALS — WEIGHT: 201 LBS | OXYGEN SATURATION: 98 % | HEIGHT: 72 IN | BODY MASS INDEX: 27.22 KG/M2 | HEART RATE: 66 BPM

## 2022-12-05 DIAGNOSIS — E11.65 TYPE 2 DIABETES MELLITUS WITH HYPERGLYCEMIA, WITHOUT LONG-TERM CURRENT USE OF INSULIN: ICD-10-CM

## 2022-12-05 DIAGNOSIS — B35.1 ONYCHOMYCOSIS: Primary | ICD-10-CM

## 2022-12-05 DIAGNOSIS — E11.42 DIABETIC PERIPHERAL NEUROPATHY ASSOCIATED WITH TYPE 2 DIABETES MELLITUS: ICD-10-CM

## 2022-12-05 PROCEDURE — 99213 OFFICE O/P EST LOW 20 MIN: CPT

## 2022-12-05 PROCEDURE — 11721 DEBRIDE NAIL 6 OR MORE: CPT

## 2022-12-05 RX ORDER — AMLODIPINE BESYLATE AND BENAZEPRIL HYDROCHLORIDE 5; 20 MG/1; MG/1
CAPSULE ORAL
COMMUNITY
Start: 2022-11-08

## 2022-12-05 NOTE — PROGRESS NOTES
12/05/2022  Foot and Ankle Surgery - Established Patient/Follow-up  Provider: TOÑITO Monaco   Location: [facility]    Subjective:  Tom Steele is a 72 y.o. male.     Chief Complaint   Patient presents with   • Left Foot - Diabetes, Peripheral Neuropathy, Nail Problem   • Right Foot - Diabetes, Peripheral Neuropathy, Nail Problem   • Follow-up     DIMITRIOS Adrian MD, last seen 04/2022 no date known     The patient is an 82-year old male who presents to clinic for a routine diabetic foot check.    He was seen in clinic approximately 6 months ago. He notes his feet are doing well. The patient states he is diabetic. He does wish to have his nails trimmed.     The patient notes his blood glucose levels are well. His physician prescribed him Ozempic, 1 dose once weekly. He was on 30 mg. His Lantus was reduced to 15 mg daily. His blood glucose levels were 95 mg/dL to 100 mg/dL. His levels are now 100 mg/dL to 110 mg/dL. He adds he has had weight loss. The patient notes the cost of his Ozempic has gone up 50% secondary to physicians prescribing it for weight loss, creating a shortage. He inquires if there is something he can soak his feet in to alleviated his fungus. The patient adds he has numbness in his feet.  He notes he had a screw in his big toe and it has been fused.     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 He notes he runs a significant amount when refereeing basketball.     No Known Allergies    Current Outpatient Medications on File Prior to Visit   Medication Sig Dispense Refill   • amLODIPine-benazepril (LOTREL 5-20) 5-20 MG per capsule      • aspirin 81 MG tablet Take 81 mg by mouth Daily.     • atorvastatin (LIPITOR) 40 MG tablet Take 1 tablet by mouth Daily. 90 tablet 4   • benazepril (LOTENSIN) 20 MG tablet Take 1 tablet by mouth Daily. 90 tablet 4   • Biotin 09347 MCG tablet Take 1 tablet by mouth Daily.     •  Cetirizine HCl 10 MG tablet dispersible 2 (two) times a day.     • Cholecalciferol 50 MCG (2000 UT) capsule Take 2,000 Units by mouth Daily.     • Chromium-Cinnamon  MCG-MG capsule Take 1 capsule by mouth 2 (two) times a day.     • Cinnamon 500 MG capsule Take  by mouth.     • Cobalamine Combinations (VITAMIN B12-FOLIC ACID) 500-400 MCG tablet Take 1 tablet by mouth Daily.     • Coenzyme Q10 (COQ-10) 200 MG capsule Take 1 capsule by mouth Daily.     • Cyanocobalamin (Vitamin B 12) 500 MCG tablet Take 500 mcg by mouth Daily.     • empagliflozin (Jardiance) 10 MG tablet tablet Take 1 tablet by mouth Daily. 90 tablet 4   • gabapentin (NEURONTIN) 100 MG capsule      • Glucosamine-Chondroit-Vit C-Mn (GLUCOSAMINE CHONDR 1500 COMPLX PO) Take 1 capsule by mouth Daily.     • glucose blood (Accu-Chek Guide) test strip Use to test blood sugar 3 times daily. Dx: E11.65 100 each 12   • hydroCHLOROthiazide (HYDRODIURIL) 25 MG tablet Take 25 mg by mouth Daily.     • Insulin Glargine (Lantus SoloStar) 100 UNIT/ML injection pen Inject 15 units subcu daily. 15 mL 5   • Insulin Pen Needle (ReliOn Pen Needles) 32G X 4 MM misc USE 1 TO INJECT INSULIN 4 TIMES DAILY 400 each 3   • meloxicam (MOBIC) 15 MG tablet Daily.     • metoprolol succinate XL (TOPROL-XL) 50 MG 24 hr tablet Take 50 mg by mouth Daily.     • Multiple Vitamin (MULTIVITAMIN) capsule Take 1 capsule by mouth Daily.     • nitroglycerin (NITROSTAT) 0.4 MG SL tablet Place 1 tablet under the tongue Every 5 (Five) Minutes As Needed for Chest Pain. Take no more than 3 doses in 15 minutes. 25 tablet 12   • nitroglycerin (NITROSTAT) 0.4 MG SL tablet Place 0.4 mg under the tongue.     • pantoprazole (PROTONIX) 40 MG EC tablet Daily.     • potassium chloride 10 MEQ CR tablet Take 10 mEq by mouth Daily.     • Probiotic Product capsule Take 1 tablet by mouth Daily.     • saccharomyces boulardii (FLORASTOR) 250 MG capsule Take 250 mg by mouth Daily.     • Semaglutide, 1 MG/DOSE,  "(Ozempic, 1 MG/DOSE,) 2 MG/1.5ML solution pen-injector Inject 1 mg under the skin into the appropriate area as directed 1 (One) Time Per Week. 3 mL 6   • Turmeric 500 MG tablet Take 1 tablet by mouth Daily.     • vitamin C (ASCORBIC ACID) 500 MG tablet Take 1,000 mg by mouth Every 12 (Twelve) Hours.       No current facility-administered medications on file prior to visit.       Objective   Pulse 66   Ht 182.9 cm (72\")   Wt 91.2 kg (201 lb)   SpO2 98%   BMI 27.26 kg/m²     Foot/Ankle Exam:       General:   Diabetic Foot Exam Performed    Appearance: appears stated age and healthy    Orientation: AAOx3    Affect: appropriate    Gait: unimpaired    Assistance: independent    Shoe Gear:  Casual shoes and socks    VASCULAR      Right Foot Vascularity   Dorsalis pedis:  2+  Posterior tibial:  1+  Skin Temperature: warm    Edema Grading:  None  CFT:  < 3 seconds  Pedal Hair Growth:  Present  Varicosities: none       Left Foot Vascularity   Dorsalis pedis:  2+  Posterior tibial:  1+  Skin Temperature: warm    Edema Grading:  None  CFT:  < 3 seconds  Pedal Hair Growth:  Present  Varicosities: none        NEUROLOGIC     Right Foot Neurologic   Light touch sensation:  Normal  Protective Sensation using [place]ah Monofilament:  7     Left Foot Neurologic   Light touch sensation:  Normal  Protective Sensation using [place]-K[place]ah Monofilament:  5     MUSCULOSKELETAL      Right Foot Musculoskeletal   Ecchymosis:  None  Tenderness: none       Left Foot Musculoskeletal   Ecchymosis:  None  Tenderness: none       DERMATOLOGIC     Right Foot Dermatologic   Skin: dry skin    Nails: onychomycosis, abnormally thick and dystrophic nails       Left Foot Dermatologic   Skin: dry skin    Nails: onychomycosis, abnormally thick and dystrophic nails        Right Foot Additional Comments The patient had 7 out of 10 on the [place]-Kylah monofilament test to the right foot.        Left Foot Additional Comments: The patient had 8 " out of 10 on the [place]-Kylah monofilament test to the left foot,        Assessment & Plan   Diagnoses and all orders for this visit:    1. Onychomycosis (Primary)    2. Type 2 diabetes mellitus with hyperglycemia, without long-term current use of insulin (HCC)    3. Diabetic peripheral neuropathy associated with type 2 diabetes mellitus (HCC)        The patient presents to clinic for a routine diabetic foot check. He is at moderate risk for pedal complications related to diabetes. Explained importance of diabetic foot care, daily foot checks, and glycemic control. Patient should check both feet on a daily basis, monitor and control blood sugars, make sure that both feet and in between toes are towel dried after baths or showers. Avoid barefoot walking at all times. Check shoes before putting them on.   Patient was given information on proper foot care. Call the office at the first signs of a wound or with signs of infection. He is to follow up in 6 months.     Nail debridement: Bilateral feet x10    Consent and time out was performed before proceeding with the procedure. Nails were debrided with a nail nipper without complication.  No anesthesia was required.  Indications for procedure were thickened, dystrophic, and fungal appearing nails which are difficult to trim.  Proper self-care and technique was discussed with patient.  Patient was stable after procedure.    No orders of the defined types were placed in this encounter.       Transcribed from ambient dictation for TOÑITO Barcenas by Padmini Guzman   12/05/22   17:58 EST    Patient or patient representative verbalized consent to the visit recording.  I have personally performed the services described in this document as transcribed by the above individual, and it is both accurate and complete.  TOÑITO Bracenas  12/6/2022  07:40 EST

## 2023-01-05 RX ORDER — INSULIN GLARGINE 100 [IU]/ML
INJECTION, SOLUTION SUBCUTANEOUS
Qty: 15 ML | Refills: 3 | Status: SHIPPED | OUTPATIENT
Start: 2023-01-05

## 2023-03-31 RX ORDER — SEMAGLUTIDE 1.34 MG/ML
INJECTION, SOLUTION SUBCUTANEOUS
Qty: 2 ML | Refills: 0 | OUTPATIENT
Start: 2023-03-31

## 2023-04-06 RX ORDER — BENAZEPRIL HYDROCHLORIDE 20 MG/1
TABLET ORAL
Qty: 90 TABLET | Refills: 3 | Status: SHIPPED | OUTPATIENT
Start: 2023-04-06

## 2023-04-11 ENCOUNTER — OFFICE VISIT (OUTPATIENT)
Dept: SLEEP MEDICINE | Facility: CLINIC | Age: 73
End: 2023-04-11
Payer: MEDICARE

## 2023-04-11 VITALS
HEIGHT: 72 IN | WEIGHT: 200 LBS | DIASTOLIC BLOOD PRESSURE: 64 MMHG | SYSTOLIC BLOOD PRESSURE: 122 MMHG | OXYGEN SATURATION: 98 % | HEART RATE: 59 BPM | BODY MASS INDEX: 27.09 KG/M2

## 2023-04-11 DIAGNOSIS — G47.33 OSA ON CPAP: Primary | ICD-10-CM

## 2023-04-11 DIAGNOSIS — Z99.89 OSA ON CPAP: Primary | ICD-10-CM

## 2023-04-11 PROCEDURE — 1160F RVW MEDS BY RX/DR IN RCRD: CPT | Performed by: INTERNAL MEDICINE

## 2023-04-11 PROCEDURE — 99213 OFFICE O/P EST LOW 20 MIN: CPT | Performed by: INTERNAL MEDICINE

## 2023-04-11 PROCEDURE — 3078F DIAST BP <80 MM HG: CPT | Performed by: INTERNAL MEDICINE

## 2023-04-11 PROCEDURE — 1159F MED LIST DOCD IN RCRD: CPT | Performed by: INTERNAL MEDICINE

## 2023-04-11 PROCEDURE — 3074F SYST BP LT 130 MM HG: CPT | Performed by: INTERNAL MEDICINE

## 2023-04-11 NOTE — PROGRESS NOTES
"  [address] [address]  Phone   Fax       SLEEP CLINIC FOLLOW UP PROGRESS NOTE.    Tom Steele  [id number]   1950  72 y.o.  male      PCP: Nellie Adrian MD      Date of visit: 4/11/2023    Chief Complaint   Patient presents with   • 1 YR F/U   • Sleep Apnea       HPI:  This is a 72 y.o. years old patient is here for the management of obstructive sleep apnea.  Sleep apnea is a severe in severity with a AHI of 32/hr. Patient is using positive airway pressure therapy with auto CPAP and the symptoms of sleep apnea have improved significantly on the therapy. Normally patient goes to bed at 11 PM and wakes up at 8 AM .  The patient wakes up 1 time(s) during the night and has no problem going back to sleep.  Feels refreshed after waking up.  He is here with his wife who is also has sleep apnea uses CPAP.  Both of them are retired and they have 8 grandchildren    Medications and allergies are reviewed by me and documented in the encounter.     SOCIAL (habits pertaining to sleep medicine)  • History tobacco use:No   • History of alcohol use: 0 per week  • Caffeine use: 1     REVIEW OF SYSTEMS:   Pertaining positive symptoms are:  • [place] Sleepiness Scale :Total score: 6       PHYSICAL EXAMINATION:  CONSTITUTIONAL:  Vitals:    04/11/23 0841   BP: 122/64   BP Location: Left arm   Patient Position: Sitting   Cuff Size: Adult   Pulse: 59   SpO2: 98%   Weight: 90.7 kg (200 lb)   Height: 182.9 cm (72\")    Body mass index is 27.12 kg/m².   NOSE: nasal passages are clear, No deformities noted   RESP SYSTEM: Not in any respiratory distress, no chest deformities noted,   CARDIOVASULAR: No edema noted  NEURO: Oriented x 3, gait normal,  Mood and affect appeared appropriate      Data reviewed:  The Smart card downloaded on 4/11/2023 has been reviewed independently by me for compliance and discussed the data with the patient.   Compliance; 100%  More " than 4 hr use, 100%  Average use of the device 8 hours and 30 minutes per night  Residual AHI: 4.9 /hr (goal < 5.0 /hr)  Mask type: Nasal mask  Device: DreamStation 2  DME: [facility]      ASSESSMENT AND PLAN:  · Obstructive sleep apnea ( G 47.33).  The symptoms of sleep apnea have improved with the device and the treatment.  Patient's compliance with the device is excellent for treatment of sleep apnea.  I have independently reviewed the smart card down load and discussed with the patient the download data and encouarged the patient to continue to use the device.The residual AHI is acceptable. The device is benefiting the patient and the device is medically necessary.  Without proper control of sleep apnea and good compliance there is a increased risk for hypertension, diabetes mellitus and nonrestorative sleep with hypersomnia which can increase risk for motor vehicle accidents.  Untreated sleep apnea is also a risk factor for development of atrial fibrillation, pulmonary hypertension, insulin resistance and stroke. The patient is also instructed to get the supplies from the [employer] and and change them on a regular basis.  A prescription for supplies has been sent to the [employer].  I have also discussed the good sleep hygiene habits and adequate amount of sleep needed for good health.   · Return in about 1 year (around 4/11/2024) for with smart card down load. . Patient's questions were answered.    4/11/2023  Odin Maldonado MD  Sleep Medicine.  Medical Director,   [facility] sleep centers.

## 2023-05-01 RX ORDER — EMPAGLIFLOZIN 10 MG/1
TABLET, FILM COATED ORAL
Qty: 90 TABLET | Refills: 3 | Status: SHIPPED | OUTPATIENT
Start: 2023-05-01

## 2023-05-03 RX ORDER — ATORVASTATIN CALCIUM 40 MG/1
1 TABLET, FILM COATED ORAL DAILY
COMMUNITY
Start: 2023-03-22

## 2023-05-03 RX ORDER — METOPROLOL TARTRATE 100 MG/1
TABLET ORAL
COMMUNITY
Start: 2023-04-10

## 2023-05-03 RX ORDER — NITROGLYCERIN 0.4 MG/1
0.4 TABLET SUBLINGUAL
COMMUNITY
Start: 2023-04-10 | End: 2024-04-09

## 2023-05-04 ENCOUNTER — LAB (OUTPATIENT)
Dept: LAB | Facility: HOSPITAL | Age: 73
End: 2023-05-04
Payer: MEDICARE

## 2023-05-04 DIAGNOSIS — Z79.4 TYPE 2 DIABETES MELLITUS WITH HYPERGLYCEMIA, WITH LONG-TERM CURRENT USE OF INSULIN: ICD-10-CM

## 2023-05-04 DIAGNOSIS — E11.65 TYPE 2 DIABETES MELLITUS WITH HYPERGLYCEMIA, WITH LONG-TERM CURRENT USE OF INSULIN: ICD-10-CM

## 2023-05-04 LAB
ALBUMIN SERPL-MCNC: 4.7 G/DL (ref 3.5–5.2)
ALBUMIN UR-MCNC: 4.6 MG/DL
ALBUMIN/GLOB SERPL: 2 G/DL
ALP SERPL-CCNC: 73 U/L (ref 39–117)
ALT SERPL W P-5'-P-CCNC: 22 U/L (ref 1–41)
ANION GAP SERPL CALCULATED.3IONS-SCNC: 10 MMOL/L (ref 5–15)
AST SERPL-CCNC: 24 U/L (ref 1–40)
BILIRUB SERPL-MCNC: 0.7 MG/DL (ref 0–1.2)
BUN SERPL-MCNC: 21 MG/DL (ref 8–23)
BUN/CREAT SERPL: 22.3 (ref 7–25)
CALCIUM SPEC-SCNC: 9.4 MG/DL (ref 8.6–10.5)
CHLORIDE SERPL-SCNC: 101 MMOL/L (ref 98–107)
CHOLEST SERPL-MCNC: 126 MG/DL (ref 0–200)
CO2 SERPL-SCNC: 30 MMOL/L (ref 22–29)
CREAT SERPL-MCNC: 0.94 MG/DL (ref 0.76–1.27)
CREAT UR-MCNC: 106.4 MG/DL
EGFRCR SERPLBLD CKD-EPI 2021: 86.1 ML/MIN/1.73
GLOBULIN UR ELPH-MCNC: 2.4 GM/DL
GLUCOSE SERPL-MCNC: 100 MG/DL (ref 65–99)
HBA1C MFR BLD: 5.4 % (ref 4.8–5.6)
HDLC SERPL-MCNC: 35 MG/DL (ref 40–60)
LDLC SERPL CALC-MCNC: 67 MG/DL (ref 0–100)
LDLC/HDLC SERPL: 1.83 {RATIO}
MICROALBUMIN/CREAT UR: 43.2 MG/G
POTASSIUM SERPL-SCNC: 4.4 MMOL/L (ref 3.5–5.2)
PROT SERPL-MCNC: 7.1 G/DL (ref 6–8.5)
SODIUM SERPL-SCNC: 141 MMOL/L (ref 136–145)
TRIGL SERPL-MCNC: 134 MG/DL (ref 0–150)
VLDLC SERPL-MCNC: 24 MG/DL (ref 5–40)

## 2023-05-04 PROCEDURE — 80053 COMPREHEN METABOLIC PANEL: CPT

## 2023-05-04 PROCEDURE — 36415 COLL VENOUS BLD VENIPUNCTURE: CPT

## 2023-05-04 PROCEDURE — 82043 UR ALBUMIN QUANTITATIVE: CPT

## 2023-05-04 PROCEDURE — 80061 LIPID PANEL: CPT

## 2023-05-04 PROCEDURE — 82570 ASSAY OF URINE CREATININE: CPT

## 2023-05-04 PROCEDURE — 83036 HEMOGLOBIN GLYCOSYLATED A1C: CPT

## 2023-05-04 RX ORDER — SEMAGLUTIDE 1.34 MG/ML
1 INJECTION, SOLUTION SUBCUTANEOUS WEEKLY
Qty: 9 ML | Refills: 2 | Status: SHIPPED | OUTPATIENT
Start: 2023-05-04

## 2023-05-11 ENCOUNTER — OFFICE VISIT (OUTPATIENT)
Dept: ENDOCRINOLOGY | Facility: CLINIC | Age: 73
End: 2023-05-11
Payer: MEDICARE

## 2023-05-11 VITALS
DIASTOLIC BLOOD PRESSURE: 82 MMHG | HEIGHT: 72 IN | WEIGHT: 202 LBS | BODY MASS INDEX: 27.36 KG/M2 | SYSTOLIC BLOOD PRESSURE: 137 MMHG | HEART RATE: 55 BPM

## 2023-05-11 DIAGNOSIS — E11.65 TYPE 2 DIABETES MELLITUS WITH HYPERGLYCEMIA, WITH LONG-TERM CURRENT USE OF INSULIN: Primary | ICD-10-CM

## 2023-05-11 DIAGNOSIS — I10 ESSENTIAL HYPERTENSION: ICD-10-CM

## 2023-05-11 DIAGNOSIS — E78.2 MIXED HYPERLIPIDEMIA: ICD-10-CM

## 2023-05-11 DIAGNOSIS — Z79.4 TYPE 2 DIABETES MELLITUS WITH HYPERGLYCEMIA, WITH LONG-TERM CURRENT USE OF INSULIN: Primary | ICD-10-CM

## 2023-05-11 LAB — GLUCOSE BLDC GLUCOMTR-MCNC: 98 MG/DL (ref 70–105)

## 2023-05-11 PROCEDURE — 82948 REAGENT STRIP/BLOOD GLUCOSE: CPT | Performed by: INTERNAL MEDICINE

## 2023-05-11 RX ORDER — SEMAGLUTIDE 1.34 MG/ML
INJECTION, SOLUTION SUBCUTANEOUS
COMMUNITY
Start: 2023-05-04 | End: 2023-05-11

## 2023-05-11 RX ORDER — SEMAGLUTIDE 1.34 MG/ML
1 INJECTION, SOLUTION SUBCUTANEOUS WEEKLY
Qty: 9 ML | Refills: 6 | Status: SHIPPED | OUTPATIENT
Start: 2023-05-11

## 2023-05-11 NOTE — PROGRESS NOTES
Endocrine Progress Note Outpatient     Patient Care Team:  Nellie Adrian MD as PCP - General (Family Medicine)    Chief Complaint: Follow-up type 2 diabetes    HPI: 71-year-old male with type 2 diabetes, hypertension, hyperlipidemia CAD is here for follow-up.    For type 2 diabetes: Diagnosed in August 2021, currently on Lantus 15 units daily with Ozempic 1.0 mg subcu weekly and Jardiance 10 mg once a day.  He is tolerating them well, he did not bring in blood sugar records for review, but mostly running between 80-1 20.  He has completed diabetes education.    Hypertension: Fair control    Hyperlipidemia: Currently on atorvastatin    CAD: Status post stent placement.    Past Medical History:   Diagnosis Date   • Allergic rhinitis    • Degenerative joint disease    • FH: cholecystectomy    • Hypercholesteremia    • Hypertension    • Multiple endocrine neoplasia    • NEREIDA (obstructive sleep apnea)    • Type 2 diabetes mellitus        Social History     Socioeconomic History   • Marital status:      Spouse name: Colette   • Number of children: 4   • Years of education: 14   Tobacco Use   • Smoking status: Never   • Smokeless tobacco: Never   Vaping Use   • Vaping Use: Never used   Substance and Sexual Activity   • Alcohol use: Not Currently   • Drug use: Never   • Sexual activity: Not Currently     Partners: Female       Family History   Problem Relation Age of Onset   • Heart disease Mother    • Heart disease Father    • Diabetes Sister    • Hypertension Sister    • Thyroid disease Sister        No Known Allergies    ROS:   Constitutional:  Denies fatigue, tiredness.    Eyes:  Denies change in visual acuity   HENT:  Denies nasal congestion or sore throat   Respiratory: denies cough, shortness of breath.   Cardiovascular:  denies chest pain, edema   GI:  Denies abdominal pain, nausea, vomiting.   Musculoskeletal:  Denies back pain or joint pain   Integument:  Denies dry skin and rash   Neurologic:  Denies  headache, focal weakness or sensory changes   Endocrine:  Denies polyuria or polydipsia   Psychiatric:  Denies depression or anxiety      Vitals:    05/11/23 1359   BP: 137/82   Pulse: 55     Body mass index is 27.4 kg/m².     Physical Exam:  GEN: NAD, conversant  EYES: EOMI, PERRL, no conjunctival erythema  NECK: no thyromegaly, full ROM   CV: RRR, no murmurs/rubs/gallops, no peripheral edema  LUNG: CTAB, no wheezes/rales/ronchi  SKIN: no rashes, no acanthosis  MSK: no deformities, full ROM of all extremities  NEURO: no tremors, DTR normal  PSYCH: AOX3, appropriate mood, affect normal      Results Review:     I reviewed the patient's new clinical results.    Lab Results   Component Value Date    HGBA1C 5.40 05/04/2023    HGBA1C 5.4 10/13/2022    HGBA1C 5.5 04/14/2022      Lab Results   Component Value Date    GLUCOSE 100 (H) 05/04/2023    BUN 21 05/04/2023    CREATININE 0.94 05/04/2023    EGFRIFNONA 71 08/03/2021    BCR 22.3 05/04/2023    K 4.4 05/04/2023    CO2 30.0 (H) 05/04/2023    CALCIUM 9.4 05/04/2023    ALBUMIN 4.7 05/04/2023    AST 24 05/04/2023    ALT 22 05/04/2023    CHOL 126 05/04/2023    TRIG 134 05/04/2023    LDL 67 05/04/2023    HDL 35 (L) 05/04/2023     Lab Results   Component Value Date    TSH 1.850 08/02/2021         Medication Review: Reviewed.       Current Outpatient Medications:   •  amLODIPine-benazepril (LOTREL 5-20) 5-20 MG per capsule, , Disp: , Rfl:   •  aspirin 81 MG tablet, Take 1 tablet by mouth Daily., Disp: , Rfl:   •  atorvastatin (LIPITOR) 40 MG tablet, Take 1 tablet by mouth Daily., Disp: 90 tablet, Rfl: 4  •  benazepril (LOTENSIN) 20 MG tablet, TAKE 1 TABLET DAILY, Disp: 90 tablet, Rfl: 3  •  Biotin 92759 MCG tablet, Take 1 tablet by mouth Daily., Disp: , Rfl:   •  Cetirizine HCl 10 MG tablet dispersible, 2 (two) times a day., Disp: , Rfl:   •  Cholecalciferol 50 MCG (2000 UT) capsule, Take 1 capsule by mouth Daily., Disp: , Rfl:   •  Chromium-Cinnamon  MCG-MG capsule, Take  1 capsule by mouth 2 (two) times a day., Disp: , Rfl:   •  Cinnamon 500 MG capsule, Take  by mouth., Disp: , Rfl:   •  Cobalamine Combinations (VITAMIN B12-FOLIC ACID) 500-400 MCG tablet, Take 1 tablet by mouth Daily., Disp: , Rfl:   •  Coenzyme Q10 (COQ-10) 200 MG capsule, Take 1 capsule by mouth Daily., Disp: , Rfl:   •  Cyanocobalamin (Vitamin B 12) 500 MCG tablet, Take 1 tablet by mouth Daily., Disp: , Rfl:   •  gabapentin (NEURONTIN) 100 MG capsule, , Disp: , Rfl:   •  Glucosamine-Chondroit-Vit C-Mn (GLUCOSAMINE CHONDR 1500 COMPLX PO), Take 1 capsule by mouth Daily., Disp: , Rfl:   •  glucose blood (Accu-Chek Guide) test strip, Use to test blood sugar 3 times daily. Dx: E11.65, Disp: 100 each, Rfl: 12  •  hydroCHLOROthiazide (HYDRODIURIL) 25 MG tablet, Take 1 tablet by mouth Daily., Disp: , Rfl:   •  Insulin Glargine (Lantus SoloStar) 100 UNIT/ML injection pen, INJECT 15 UNITS UNDER THE  SKIN INTO THE APPROPRIATE  AREA AS DIRECTED DAILY WITHBREAKFAST, Disp: 15 mL, Rfl: 3  •  Insulin Pen Needle (ReliOn Pen Needles) 32G X 4 MM misc, USE 1 TO INJECT INSULIN 4 TIMES DAILY, Disp: 400 each, Rfl: 3  •  Jardiance 10 MG tablet tablet, TAKE 1 TABLET DAILY, Disp: 90 tablet, Rfl: 3  •  meloxicam (MOBIC) 15 MG tablet, Daily., Disp: , Rfl:   •  metoprolol tartrate (LOPRESSOR) 100 MG tablet, Take 1 tablet by mouth 2 (Two) Times a Day., Disp: , Rfl:   •  Multiple Vitamin (MULTIVITAMIN) capsule, Take 1 capsule by mouth Daily., Disp: , Rfl:   •  nitroglycerin (NITROSTAT) 0.4 MG SL tablet, Place 1 tablet under the tongue Every 5 (Five) Minutes As Needed for Chest Pain. Take no more than 3 doses in 15 minutes., Disp: 25 tablet, Rfl: 12  •  nitroglycerin (NITROSTAT) 0.4 MG SL tablet, Place 1 tablet under the tongue., Disp: , Rfl:   •  Ozempic, 1 MG/DOSE, 4 MG/3ML solution pen-injector, , Disp: , Rfl:   •  pantoprazole (PROTONIX) 40 MG EC tablet, Daily., Disp: , Rfl:   •  potassium chloride 10 MEQ CR tablet, Take 1 tablet by mouth  Daily., Disp: , Rfl:   •  Probiotic Product capsule, Take 1 tablet by mouth Daily., Disp: , Rfl:   •  saccharomyces boulardii (FLORASTOR) 250 MG capsule, Take 1 capsule by mouth Daily., Disp: , Rfl:   •  Semaglutide, 1 MG/DOSE, (Ozempic, 1 MG/DOSE,) 2 MG/1.5ML solution pen-injector, Inject 1 mg under the skin into the appropriate area as directed 1 (One) Time Per Week., Disp: 9 mL, Rfl: 2  •  Turmeric 500 MG tablet, Take 1 tablet by mouth Daily., Disp: , Rfl:   •  vitamin C (ASCORBIC ACID) 500 MG tablet, Take 2 tablets by mouth Every 12 (Twelve) Hours., Disp: , Rfl:       Assessment & Plan   Diabetes mellitus type 2 with hyperglycemia: Well-controlled with normal A1c.  At this time I will increase Ozempic to 2 mg once a week, continue Jardiance and DC Lantus.  Continue to follow blood sugars and A1c.  Continue to work on diet and activity.     Hypertension: Fair control, continue current medication    Hyperlipidemia: Well-controlled, continue current medications.    Essman and plan from October 25, 2022 reviewed and updated.            Bryce Anderson MD FACE.                   [NI] : Endocrine [Nl] : Hematologic/Lymphatic [FreeTextEntry1] : Review of systems is negative for Change in activity, Chest pain, Shortness of breath, Tachypnea, Wheezing, Rash, or Heart Problems.

## 2023-05-11 NOTE — PATIENT INSTRUCTIONS
Increase Ozempic to 2 mg subcu weekly  DC Lantus  Continue Jardiance 10 mg once a day  Continue to work on your diet and activity  Labs before follow-up.

## 2023-06-06 ENCOUNTER — OFFICE VISIT (OUTPATIENT)
Dept: PODIATRY | Facility: CLINIC | Age: 73
End: 2023-06-06
Payer: MEDICARE

## 2023-06-06 VITALS — BODY MASS INDEX: 27.09 KG/M2 | HEIGHT: 72 IN | OXYGEN SATURATION: 96 % | WEIGHT: 200 LBS | HEART RATE: 72 BPM

## 2023-06-06 DIAGNOSIS — R09.89 DECREASED PEDAL PULSES: Primary | ICD-10-CM

## 2023-06-06 DIAGNOSIS — E11.42 DIABETIC PERIPHERAL NEUROPATHY ASSOCIATED WITH TYPE 2 DIABETES MELLITUS: ICD-10-CM

## 2023-06-06 DIAGNOSIS — B35.1 ONYCHOMYCOSIS: ICD-10-CM

## 2023-06-06 DIAGNOSIS — E11.65 TYPE 2 DIABETES MELLITUS WITH HYPERGLYCEMIA, WITHOUT LONG-TERM CURRENT USE OF INSULIN: ICD-10-CM

## 2023-06-06 NOTE — PROGRESS NOTES
06/06/2023  Foot and Ankle Surgery - Established Patient/Follow-up  Provider: TOÑITO Monaco   Location: [facility]    Subjective:  Tom Steele is a 72 y.o. male.     Chief Complaint   Patient presents with    Right Foot - Diabetes, Peripheral Neuropathy     Dm foot care    Left Foot - Diabetes, Peripheral Neuropathy     D, foot care    Follow-up     NIKO Adrian md  date unknown     HPI:   The patient presents today for routine diabetic food check.     The patient reports a numbness sensation secondary to neuropathy. He occasionally has pain and spasms in his feet. He denies if anything makes it feel better or worse. He states the spasms occur at night when laying in bed. He is unaware of having any issues with circulation. He denies smoking. The patient states he has had the spasm issue for a couple of years. He denies a burning sensation or pins and needles. The patient inquires if it may be from knee pain. He communicates having a screw in his right toe. He reports having a heart stent. 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 He    The patient reports having issues with thickened and discolored toenails for a couple of years. He denies toenail trimming.     He states his hemoglobin A1c is 5.2 percent. His blood sugar readings range from 80 to 100.    No Known Allergies    Current Outpatient Medications on File Prior to Visit   Medication Sig Dispense Refill    amLODIPine-benazepril (LOTREL 5-20) 5-20 MG per capsule       aspirin 81 MG tablet Take 1 tablet by mouth Daily.      atorvastatin (LIPITOR) 40 MG tablet Take 1 tablet by mouth Daily. 90 tablet 4    benazepril (LOTENSIN) 20 MG tablet TAKE 1 TABLET DAILY 90 tablet 3    Biotin 94670 MCG tablet Take 1 tablet by mouth Daily.      Cetirizine HCl 10 MG tablet dispersible 2 (two) times a day.      Cholecalciferol 50 MCG (2000 UT) capsule Take 1 capsule by mouth Daily.      Chromium-Cinnamon  MCG-MG capsule Take 1 capsule  by mouth 2 (two) times a day.      Cinnamon 500 MG capsule Take  by mouth.      Cobalamine Combinations (VITAMIN B12-FOLIC ACID) 500-400 MCG tablet Take 1 tablet by mouth Daily.      Coenzyme Q10 (COQ-10) 200 MG capsule Take 1 capsule by mouth Daily.      Cyanocobalamin (Vitamin B 12) 500 MCG tablet Take 1 tablet by mouth Daily.      empagliflozin (Jardiance) 10 MG tablet tablet Take 1 tablet by mouth Daily. 90 tablet 3    gabapentin (NEURONTIN) 100 MG capsule       Glucosamine-Chondroit-Vit C-Mn (GLUCOSAMINE CHONDR 1500 COMPLX PO) Take 1 capsule by mouth Daily.      glucose blood (Accu-Chek Guide) test strip Use to test blood sugar 3 times daily. Dx: E11.65 100 each 12    hydroCHLOROthiazide (HYDRODIURIL) 25 MG tablet Take 1 tablet by mouth Daily.      Insulin Pen Needle (ReliOn Pen Needles) 32G X 4 MM misc USE 1 TO INJECT INSULIN 4 TIMES DAILY 400 each 3    meloxicam (MOBIC) 15 MG tablet Daily.      metoprolol tartrate (LOPRESSOR) 100 MG tablet Take 1 tablet by mouth 2 (Two) Times a Day.      Multiple Vitamin (MULTIVITAMIN) capsule Take 1 capsule by mouth Daily.      nitroglycerin (NITROSTAT) 0.4 MG SL tablet Place 1 tablet under the tongue Every 5 (Five) Minutes As Needed for Chest Pain. Take no more than 3 doses in 15 minutes. 25 tablet 12    nitroglycerin (NITROSTAT) 0.4 MG SL tablet Place 1 tablet under the tongue.      pantoprazole (PROTONIX) 40 MG EC tablet Daily.      potassium chloride 10 MEQ CR tablet Take 1 tablet by mouth Daily.      Probiotic Product capsule Take 1 tablet by mouth Daily.      saccharomyces boulardii (FLORASTOR) 250 MG capsule Take 1 capsule by mouth Daily.      Semaglutide, 1 MG/DOSE, (Ozempic, 1 MG/DOSE,) 2 MG/1.5ML solution pen-injector Inject 1 mg under the skin into the appropriate area as directed 1 (One) Time Per Week. 9 mL 6    Turmeric 500 MG tablet Take 1 tablet by mouth Daily.      vitamin C (ASCORBIC ACID) 500 MG tablet Take 2 tablets by mouth Every 12 (Twelve) Hours.    "    No current facility-administered medications on file prior to visit.       Objective   Pulse 72   Ht 182.9 cm (72\")   Wt 90.7 kg (200 lb)   SpO2 96%   BMI 27.12 kg/m²     Foot/Ankle Exam    GENERAL  Diabetic foot exam performed    Orientation:  AAOx3  Affect:  appropriate  Gait:  unimpaired  Assistance:  independent    VASCULAR     Right Foot Vascularity   Dorsalis pedis:  2+  Posterior tibial:  1+  Skin temperature:  warm  Edema grading:  None  CFT:  < 3 seconds  Pedal hair growth:  Present  Varicosities:  none     Left Foot Vascularity   Dorsalis pedis:  2+  Posterior tibial:  1+  Skin temperature:  warm  Edema grading:  None  CFT:  < 3 seconds  Pedal hair growth:  Present  Varicosities:  none     NEUROLOGIC     Right Foot Neurologic   Light touch sensation: normal     Left Foot Neurologic   Light touch sensation: normal    MUSCULOSKELETAL     Right Foot Musculoskeletal   Tenderness:  none       Left Foot Musculoskeletal   Tenderness:  none    DERMATOLOGIC      Right Foot Dermatologic   Skin  Positive for dryness.      Left Foot Dermatologic   Skin  Positive for dryness.      Right foot additional comments: The patient had 7 out of 10 on the [place] monofilament test to the right foot.      06/06/2023  Unable to palpate pedal pulse. 10 out of 10 on [place] K[place] testing to bilateral feet. Thickened, dystrophic, onychomycosis two, nails one through five on bilateral feet.      Left foot additional comments: The patient had 8 out of 10 on the [place] monofilament test to the left foot,      06/06/2023  Unable to palpate pedal pulse. 10 out of 10 on [place] testing to bilateral feet. Thickened, dystrophic, onychomycosis two, nails one through five on bilateral feet.       Assessment & Plan   Diagnoses and all orders for this visit:    1. Decreased pedal pulses (Primary)  -     Doppler Arterial Multi Level Lower Extremity - Bilateral CAR; Future      Do feel the patient is at mild " risk for pedal complications related to diabetes. Recommend an VISHAL to check circulation. Will order an VISHAL. Informed the patient about different options to prevent neuropathy. Explained to the patient about prevention and treatment for toenail fungus. Follow-up in 6 months.    Explained importance of diabetic foot care, daily foot checks, and glycemic control. Patient should check both feet on a daily basis, monitor and control blood sugars, make sure that both feet and in between toes are towel dried after baths or showers. Avoid barefoot walking at all times. Check shoes before putting them on.   Patient was given information on proper foot care. Call the office at the first signs of a wound or with signs of infection.    No orders of the defined types were placed in this encounter.      Transcribed from ambient dictation for TOÑITO Barcenas by Candelario Sorenson.  06/06/23   15:44 EDT    Patient or patient representative verbalized consent to the visit recording.  I have personally performed the services described in this document as transcribed by the above individual, and it is both accurate and complete.

## 2023-09-06 ENCOUNTER — HOSPITAL ENCOUNTER (OUTPATIENT)
Dept: CARDIOLOGY | Facility: HOSPITAL | Age: 73
Discharge: HOME | End: 2023-09-06
Payer: MEDICARE

## 2023-09-06 ENCOUNTER — TRANSCRIBE ORDERS (OUTPATIENT)
Dept: ADMINISTRATIVE | Facility: HOSPITAL | Age: 73
End: 2023-09-06
Payer: MEDICARE

## 2023-09-06 ENCOUNTER — LAB (OUTPATIENT)
Dept: LAB | Facility: HOSPITAL | Age: 73
End: 2023-09-06
Payer: MEDICARE

## 2023-09-06 DIAGNOSIS — Z01.818 OTHER SPECIFIED PRE-OPERATIVE EXAMINATION: ICD-10-CM

## 2023-09-06 DIAGNOSIS — Z01.818 OTHER SPECIFIED PRE-OPERATIVE EXAMINATION: Primary | ICD-10-CM

## 2023-09-06 LAB
ALBUMIN SERPL-MCNC: 4.3 G/DL (ref 3.5–5.2)
ANION GAP SERPL CALCULATED.3IONS-SCNC: 11 MMOL/L (ref 5–15)
BASOPHILS # BLD AUTO: 0.1 10*3/MM3 (ref 0–0.2)
BASOPHILS NFR BLD AUTO: 0.9 % (ref 0–1.5)
BUN SERPL-MCNC: 23 MG/DL (ref 8–23)
BUN/CREAT SERPL: 25.6 (ref 7–25)
CALCIUM SPEC-SCNC: 9.3 MG/DL (ref 8.6–10.5)
CHLORIDE SERPL-SCNC: 100 MMOL/L (ref 98–107)
CO2 SERPL-SCNC: 29 MMOL/L (ref 22–29)
CREAT SERPL-MCNC: 0.9 MG/DL (ref 0.76–1.27)
DEPRECATED RDW RBC AUTO: 46.4 FL (ref 37–54)
EGFRCR SERPLBLD CKD-EPI 2021: 90.7 ML/MIN/1.73
EOSINOPHIL # BLD AUTO: 0.1 10*3/MM3 (ref 0–0.4)
EOSINOPHIL NFR BLD AUTO: 1.2 % (ref 0.3–6.2)
ERYTHROCYTE [DISTWIDTH] IN BLOOD BY AUTOMATED COUNT: 13.7 % (ref 12.3–15.4)
GLUCOSE SERPL-MCNC: 92 MG/DL (ref 65–99)
HBA1C MFR BLD: 5.5 % (ref 4.8–5.6)
HCT VFR BLD AUTO: 42.9 % (ref 37.5–51)
HGB BLD-MCNC: 14.7 G/DL (ref 13–17.7)
LYMPHOCYTES # BLD AUTO: 1.6 10*3/MM3 (ref 0.7–3.1)
LYMPHOCYTES NFR BLD AUTO: 21.2 % (ref 19.6–45.3)
MCH RBC QN AUTO: 31.6 PG (ref 26.6–33)
MCHC RBC AUTO-ENTMCNC: 34.2 G/DL (ref 31.5–35.7)
MCV RBC AUTO: 92.4 FL (ref 79–97)
MONOCYTES # BLD AUTO: 0.8 10*3/MM3 (ref 0.1–0.9)
MONOCYTES NFR BLD AUTO: 10.5 % (ref 5–12)
MRSA DNA SPEC QL NAA+PROBE: NORMAL
NEUTROPHILS NFR BLD AUTO: 5.1 10*3/MM3 (ref 1.7–7)
NEUTROPHILS NFR BLD AUTO: 66.2 % (ref 42.7–76)
NRBC BLD AUTO-RTO: 0.2 /100 WBC (ref 0–0.2)
PLATELET # BLD AUTO: 247 10*3/MM3 (ref 140–450)
PMV BLD AUTO: 8.9 FL (ref 6–12)
POTASSIUM SERPL-SCNC: 4.3 MMOL/L (ref 3.5–5.2)
RBC # BLD AUTO: 4.65 10*6/MM3 (ref 4.14–5.8)
SODIUM SERPL-SCNC: 140 MMOL/L (ref 136–145)
WBC NRBC COR # BLD: 7.7 10*3/MM3 (ref 3.4–10.8)

## 2023-09-06 PROCEDURE — 87641 MR-STAPH DNA AMP PROBE: CPT

## 2023-09-06 PROCEDURE — 82040 ASSAY OF SERUM ALBUMIN: CPT

## 2023-09-06 PROCEDURE — 85025 COMPLETE CBC W/AUTO DIFF WBC: CPT

## 2023-09-06 PROCEDURE — 80048 BASIC METABOLIC PNL TOTAL CA: CPT

## 2023-09-06 PROCEDURE — 36415 COLL VENOUS BLD VENIPUNCTURE: CPT

## 2023-09-06 PROCEDURE — 83036 HEMOGLOBIN GLYCOSYLATED A1C: CPT

## 2023-09-06 PROCEDURE — 93005 ELECTROCARDIOGRAM TRACING: CPT | Performed by: ORTHOPAEDIC SURGERY

## 2023-09-09 LAB
QT INTERVAL: 366 MS
QTC INTERVAL: 370 MS

## 2023-11-20 ENCOUNTER — LAB (OUTPATIENT)
Dept: LAB | Facility: HOSPITAL | Age: 73
End: 2023-11-20
Payer: MEDICARE

## 2023-11-20 ENCOUNTER — TELEPHONE (OUTPATIENT)
Dept: LAB | Facility: HOSPITAL | Age: 73
End: 2023-11-20
Payer: MEDICARE

## 2023-11-20 DIAGNOSIS — E11.65 TYPE 2 DIABETES MELLITUS WITH HYPERGLYCEMIA, WITH LONG-TERM CURRENT USE OF INSULIN: ICD-10-CM

## 2023-11-20 DIAGNOSIS — Z79.4 TYPE 2 DIABETES MELLITUS WITH HYPERGLYCEMIA, WITH LONG-TERM CURRENT USE OF INSULIN: ICD-10-CM

## 2023-11-20 LAB
ALBUMIN SERPL-MCNC: 4.7 G/DL (ref 3.5–5.2)
ALBUMIN UR-MCNC: 4.6 MG/DL
ALBUMIN/GLOB SERPL: 2.6 G/DL
ALP SERPL-CCNC: 79 U/L (ref 39–117)
ALT SERPL W P-5'-P-CCNC: 20 U/L (ref 1–41)
ANION GAP SERPL CALCULATED.3IONS-SCNC: 8 MMOL/L (ref 5–15)
AST SERPL-CCNC: 20 U/L (ref 1–40)
BILIRUB SERPL-MCNC: 0.4 MG/DL (ref 0–1.2)
BUN SERPL-MCNC: 15 MG/DL (ref 8–23)
BUN/CREAT SERPL: 16 (ref 7–25)
CALCIUM SPEC-SCNC: 9.7 MG/DL (ref 8.6–10.5)
CHLORIDE SERPL-SCNC: 103 MMOL/L (ref 98–107)
CHOLEST SERPL-MCNC: 123 MG/DL (ref 0–200)
CO2 SERPL-SCNC: 32 MMOL/L (ref 22–29)
CREAT SERPL-MCNC: 0.94 MG/DL (ref 0.76–1.27)
CREAT UR-MCNC: 139.4 MG/DL
EGFRCR SERPLBLD CKD-EPI 2021: 85.6 ML/MIN/1.73
GLOBULIN UR ELPH-MCNC: 1.8 GM/DL
GLUCOSE SERPL-MCNC: 97 MG/DL (ref 65–99)
HBA1C MFR BLD: 5.3 % (ref 4.8–5.6)
HDLC SERPL-MCNC: 32 MG/DL (ref 40–60)
LDLC SERPL CALC-MCNC: 62 MG/DL (ref 0–100)
LDLC/HDLC SERPL: 1.79 {RATIO}
MICROALBUMIN/CREAT UR: 33 MG/G (ref 0–29)
POTASSIUM SERPL-SCNC: 4.6 MMOL/L (ref 3.5–5.2)
PROT SERPL-MCNC: 6.5 G/DL (ref 6–8.5)
SODIUM SERPL-SCNC: 143 MMOL/L (ref 136–145)
TRIGL SERPL-MCNC: 168 MG/DL (ref 0–150)
VLDLC SERPL-MCNC: 29 MG/DL (ref 5–40)

## 2023-11-20 PROCEDURE — 82043 UR ALBUMIN QUANTITATIVE: CPT

## 2023-11-20 PROCEDURE — 82570 ASSAY OF URINE CREATININE: CPT

## 2023-11-20 PROCEDURE — 36415 COLL VENOUS BLD VENIPUNCTURE: CPT

## 2023-11-20 PROCEDURE — 80061 LIPID PANEL: CPT

## 2023-11-20 PROCEDURE — 83036 HEMOGLOBIN GLYCOSYLATED A1C: CPT

## 2023-11-20 PROCEDURE — 80053 COMPREHEN METABOLIC PANEL: CPT

## 2023-11-20 NOTE — TELEPHONE ENCOUNTER
Pt notified that it has not been 3 mths since last A1C and insurance might not cover it. But he want me to draw A1C and he will be responsible for cost if not covered by insurance.

## 2023-11-27 ENCOUNTER — OFFICE VISIT (OUTPATIENT)
Dept: ENDOCRINOLOGY | Facility: CLINIC | Age: 73
End: 2023-11-27
Payer: MEDICARE

## 2023-11-27 VITALS
SYSTOLIC BLOOD PRESSURE: 135 MMHG | OXYGEN SATURATION: 97 % | DIASTOLIC BLOOD PRESSURE: 68 MMHG | WEIGHT: 202 LBS | HEART RATE: 70 BPM | HEIGHT: 72 IN | BODY MASS INDEX: 27.36 KG/M2

## 2023-11-27 DIAGNOSIS — E78.2 MIXED HYPERLIPIDEMIA: ICD-10-CM

## 2023-11-27 DIAGNOSIS — E11.65 TYPE 2 DIABETES MELLITUS WITH HYPERGLYCEMIA, WITH LONG-TERM CURRENT USE OF INSULIN: Primary | ICD-10-CM

## 2023-11-27 DIAGNOSIS — I10 ESSENTIAL HYPERTENSION: ICD-10-CM

## 2023-11-27 DIAGNOSIS — Z79.4 TYPE 2 DIABETES MELLITUS WITH HYPERGLYCEMIA, WITH LONG-TERM CURRENT USE OF INSULIN: Primary | ICD-10-CM

## 2023-11-27 LAB — GLUCOSE BLDC GLUCOMTR-MCNC: 116 MG/DL (ref 70–105)

## 2023-11-27 PROCEDURE — 1159F MED LIST DOCD IN RCRD: CPT | Performed by: INTERNAL MEDICINE

## 2023-11-27 PROCEDURE — 82948 REAGENT STRIP/BLOOD GLUCOSE: CPT | Performed by: INTERNAL MEDICINE

## 2023-11-27 PROCEDURE — 3075F SYST BP GE 130 - 139MM HG: CPT | Performed by: INTERNAL MEDICINE

## 2023-11-27 PROCEDURE — 3078F DIAST BP <80 MM HG: CPT | Performed by: INTERNAL MEDICINE

## 2023-11-27 PROCEDURE — 99214 OFFICE O/P EST MOD 30 MIN: CPT | Performed by: INTERNAL MEDICINE

## 2023-11-27 PROCEDURE — 3044F HG A1C LEVEL LT 7.0%: CPT | Performed by: INTERNAL MEDICINE

## 2023-11-27 PROCEDURE — 1160F RVW MEDS BY RX/DR IN RCRD: CPT | Performed by: INTERNAL MEDICINE

## 2023-11-27 RX ORDER — BENAZEPRIL HYDROCHLORIDE 20 MG/1
20 TABLET ORAL DAILY
Qty: 90 TABLET | Refills: 3 | Status: SHIPPED | OUTPATIENT
Start: 2023-11-27 | End: 2024-11-21

## 2023-11-27 RX ORDER — SEMAGLUTIDE 2.68 MG/ML
2 INJECTION, SOLUTION SUBCUTANEOUS WEEKLY
Qty: 9 ML | Refills: 3 | Status: SHIPPED | OUTPATIENT
Start: 2023-11-27

## 2023-11-27 RX ORDER — SEMAGLUTIDE 1.34 MG/ML
INJECTION, SOLUTION SUBCUTANEOUS
COMMUNITY
Start: 2023-09-11 | End: 2023-11-27 | Stop reason: SDUPTHER

## 2023-11-27 RX ORDER — ATORVASTATIN CALCIUM 40 MG/1
40 TABLET, FILM COATED ORAL DAILY
Qty: 90 TABLET | Refills: 4 | Status: SHIPPED | OUTPATIENT
Start: 2023-11-27

## 2023-11-27 NOTE — PROGRESS NOTES
Endocrine Progress Note Outpatient     Patient Care Team:  Nellie Adrian MD as PCP - General (Family Medicine)    Chief Complaint: Follow-up type 2 diabetes    HPI: 73-year-old male with type 2 diabetes, hypertension, hyperlipidemia CAD is here for follow-up.    For type 2 diabetes: Diagnosed in August 2021, currently on Ozempic 2.0 mg subcu weekly and Jardiance 10 mg once a day.  He did bring in his glucometer for review and most of his blood sugars are running less than 120.  He is tolerating his medications well.    Hypertension: Fair control    Hyperlipidemia: Currently on atorvastatin    CAD: Status post stent placement.    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    NEREIDA (obstructive sleep apnea)     Type 2 diabetes mellitus        Social History     Socioeconomic History    Marital status:      Spouse name: Colette    Number of children: 4    Years of education: 14   Tobacco Use    Smoking status: Never    Smokeless tobacco: Never   Vaping Use    Vaping Use: Never used   Substance and Sexual Activity    Alcohol use: Not Currently    Drug use: Never    Sexual activity: Not Currently     Partners: Female       Family History   Problem Relation Age of Onset    Heart disease Mother     Heart disease Father     Diabetes Sister     Hypertension Sister     Thyroid disease Sister        No Known Allergies    ROS:   Constitutional:  Denies fatigue, tiredness.    Eyes:  Denies change in visual acuity   HENT:  Denies nasal congestion or sore throat   Respiratory: denies cough, shortness of breath.   Cardiovascular:  denies chest pain, edema   GI:  Denies abdominal pain, nausea, vomiting.   Musculoskeletal:  Denies back pain or joint pain   Integument:  Denies dry skin and rash   Neurologic:  Denies  headache, focal weakness or sensory changes   Endocrine:  Denies polyuria or polydipsia   Psychiatric:  Denies depression or anxiety      Vitals:    11/27/23 1419   BP: 135/68   Pulse: 70   SpO2: 97%     Body mass index is 27.4 kg/m².     Physical Exam:  GEN: NAD, conversant  EYES: EOMI,   NECK: no thyromegaly,   CV: RRR,   LUNG: CTA  PSYCH: AOX3, appropriate mood, affect normal      Results Review:     I reviewed the patient's new clinical results.    Lab Results   Component Value Date    HGBA1C 5.30 11/20/2023    HGBA1C 5.50 09/06/2023    HGBA1C 5.40 05/04/2023      Lab Results   Component Value Date    GLUCOSE 97 11/20/2023    BUN 15 11/20/2023    CREATININE 0.94 11/20/2023    EGFRIFNONA 71 08/03/2021    BCR 16.0 11/20/2023    K 4.6 11/20/2023    CO2 32.0 (H) 11/20/2023    CALCIUM 9.7 11/20/2023    ALBUMIN 4.7 11/20/2023    AST 20 11/20/2023    ALT 20 11/20/2023    CHOL 123 11/20/2023    TRIG 168 (H) 11/20/2023    LDL 62 11/20/2023    HDL 32 (L) 11/20/2023     Lab Results   Component Value Date    TSH 1.850 08/02/2021         Medication Review: Reviewed.       Current Outpatient Medications:     amLODIPine-benazepril (LOTREL 5-20) 5-20 MG per capsule, , Disp: , Rfl:     aspirin 81 MG tablet, Take 1 tablet by mouth Daily., Disp: , Rfl:     atorvastatin (LIPITOR) 40 MG tablet, Take 1 tablet by mouth Daily., Disp: 90 tablet, Rfl: 4    benazepril (LOTENSIN) 20 MG tablet, TAKE 1 TABLET DAILY, Disp: 90 tablet, Rfl: 3    Biotin 16376 MCG tablet, Take 1 tablet by mouth Daily., Disp: , Rfl:     Cetirizine HCl 10 MG tablet dispersible, 2 (two) times a day., Disp: , Rfl:     Cholecalciferol 50 MCG (2000 UT) capsule, Take 1 capsule by mouth Daily., Disp: , Rfl:     Chromium-Cinnamon  MCG-MG capsule, Take 1 capsule by mouth 2 (two) times a day., Disp: , Rfl:     Cinnamon 500 MG capsule, Take  by mouth., Disp: , Rfl:     Cobalamine Combinations (VITAMIN B12-FOLIC ACID) 500-400 MCG tablet, Take 1 tablet by mouth Daily.,  Disp: , Rfl:     Coenzyme Q10 (COQ-10) 200 MG capsule, Take 1 capsule by mouth Daily., Disp: , Rfl:     Cyanocobalamin (Vitamin B 12) 500 MCG tablet, Take 1 tablet by mouth Daily., Disp: , Rfl:     empagliflozin (Jardiance) 10 MG tablet tablet, Take 1 tablet by mouth Daily., Disp: 90 tablet, Rfl: 3    gabapentin (NEURONTIN) 100 MG capsule, , Disp: , Rfl:     Glucosamine-Chondroit-Vit C-Mn (GLUCOSAMINE CHONDR 1500 COMPLX PO), Take 1 capsule by mouth Daily., Disp: , Rfl:     glucose blood (Accu-Chek Guide) test strip, Use to test blood sugar 3 times daily. Dx: E11.65, Disp: 100 each, Rfl: 12    hydroCHLOROthiazide (HYDRODIURIL) 25 MG tablet, Take 1 tablet by mouth Daily., Disp: , Rfl:     Insulin Pen Needle (ReliOn Pen Needles) 32G X 4 MM misc, USE 1 TO INJECT INSULIN 4 TIMES DAILY, Disp: 400 each, Rfl: 3    meloxicam (MOBIC) 15 MG tablet, Daily., Disp: , Rfl:     metoprolol tartrate (LOPRESSOR) 100 MG tablet, Take 1 tablet by mouth 2 (Two) Times a Day., Disp: , Rfl:     Multiple Vitamin (MULTIVITAMIN) capsule, Take 1 capsule by mouth Daily., Disp: , Rfl:     mupirocin (BACTROBAN) 2 % ointment, APPLY PEA SIZE AMOUNT TO QTIP AND SWAB LOWER PART OF NOSTRIL MAKING SURE TO TOUCH ALL SIDES DO SAME TO OTHER NOSTRIL PINCH NOSE TOGETHER AND MASSAGE FOR 1 MINUTE REPEAT 2 TIMES DAILY FOR 5 DAYS PRIOR TO SUREGERY, Disp: , Rfl:     nitroglycerin (NITROSTAT) 0.4 MG SL tablet, Place 1 tablet under the tongue Every 5 (Five) Minutes As Needed for Chest Pain. Take no more than 3 doses in 15 minutes., Disp: 25 tablet, Rfl: 12    nitroglycerin (NITROSTAT) 0.4 MG SL tablet, Place 1 tablet under the tongue., Disp: , Rfl:     Ozempic, 1 MG/DOSE, 4 MG/3ML solution pen-injector, , Disp: , Rfl:     pantoprazole (PROTONIX) 40 MG EC tablet, Daily., Disp: , Rfl:     potassium chloride 10 MEQ CR tablet, Take 1 tablet by mouth Daily., Disp: , Rfl:     Probiotic Product capsule, Take 1 tablet by mouth Daily., Disp: , Rfl:     saccharomyces  boulardii (FLORASTOR) 250 MG capsule, Take 1 capsule by mouth Daily., Disp: , Rfl:     Semaglutide, 2 MG/DOSE, (Ozempic, 2 MG/DOSE,) 8 MG/3ML solution pen-injector, Inject 2 mg under the skin into the appropriate area as directed 1 (One) Time Per Week. DX/ e11.65, Disp: 3 mL, Rfl: 3    Turmeric 500 MG tablet, Take 1 tablet by mouth Daily., Disp: , Rfl:     vitamin C (ASCORBIC ACID) 500 MG tablet, Take 2 tablets by mouth Every 12 (Twelve) Hours., Disp: , Rfl:       Assessment & Plan   Diabetes mellitus type 2: Well-controlled with normal A1c at 5.3%.  Encouraged to continue to work on diet and activity.  We will continue Ozempic 2 mg once a week with Jardiance 10 mg once a day.    Hypertension: Fair control, continue current medication    Hyperlipidemia: Well-controlled, continue current medications.    Assessment and plan from May 11, 2023 reviewed and updated.            Bryce Anderson MD FACE.

## 2023-12-08 ENCOUNTER — OFFICE VISIT (OUTPATIENT)
Dept: PODIATRY | Facility: CLINIC | Age: 73
End: 2023-12-08
Payer: MEDICARE

## 2023-12-08 VITALS — WEIGHT: 200 LBS | HEART RATE: 69 BPM | BODY MASS INDEX: 27.09 KG/M2 | HEIGHT: 72 IN

## 2023-12-08 DIAGNOSIS — E11.42 DIABETIC PERIPHERAL NEUROPATHY ASSOCIATED WITH TYPE 2 DIABETES MELLITUS: Primary | ICD-10-CM

## 2023-12-08 DIAGNOSIS — M76.62 TENDONITIS, ACHILLES, LEFT: ICD-10-CM

## 2023-12-08 DIAGNOSIS — E11.65 TYPE 2 DIABETES MELLITUS WITH HYPERGLYCEMIA, WITHOUT LONG-TERM CURRENT USE OF INSULIN: ICD-10-CM

## 2023-12-08 PROCEDURE — 99213 OFFICE O/P EST LOW 20 MIN: CPT

## 2023-12-08 NOTE — PROGRESS NOTES
12/08/2023  Foot and Ankle Surgery - Established Patient/Follow-up  Provider: TOÑITO Monaco   Location: [facility]    Subjective:  Tom Steele is a 73 y.o. male.     Chief Complaint   Patient presents with    Right Foot - Follow-up     Diabetic foot check     Left Foot - Follow-up     Diabetic foot check     Follow-up     Last saw DIMITRIOS dior MD 7/2023       DAXHPI    The patient is here today for a routine diabetic foot check.    His feet are doing fine except his left Achilles started bothering him yesterday,12/07/2023. It has been a problem for him before. He had a partial tear on one of his heels, but he cannot remember if it was left or right. He has been less active because he had left knee arthroplasty. He thinks it is because he has been inactive. He denies any history of problems with blood flow to his feet. He had an VISHAL test last summer and everything was normal. He does not want his nails trimmed today. The patient has a small scrape on his shin. He reports that his wife usually applies Aquaphor to his dry skin.    The patient's hemoglobin A1c is 5.3 percent. His daily blood glucose levels are 100 mg/dL or less.    No Known Allergies    Current Outpatient Medications on File Prior to Visit   Medication Sig Dispense Refill    amLODIPine-benazepril (LOTREL 5-20) 5-20 MG per capsule       aspirin 81 MG tablet Take 1 tablet by mouth Daily.      atorvastatin (LIPITOR) 40 MG tablet Take 1 tablet by mouth Daily. 90 tablet 4    benazepril (LOTENSIN) 20 MG tablet Take 1 tablet by mouth Daily for 360 days. 90 tablet 3    Biotin 44902 MCG tablet Take 1 tablet by mouth Daily.      Cetirizine HCl 10 MG tablet dispersible 2 (two) times a day.      Cholecalciferol 50 MCG (2000 UT) capsule Take 1 capsule by mouth Daily.      Chromium-Cinnamon  MCG-MG capsule Take 1 capsule by mouth 2 (two) times a day.      Cinnamon 500 MG capsule Take  by mouth.      Cobalamine Combinations (VITAMIN B12-FOLIC  Patient is due for colonoscopy/colon cancer screening. I have updated his referral to Dr. Vanesa Armendariz. Please have him call his office to schedule. ACID) 500-400 MCG tablet Take 1 tablet by mouth Daily.      Coenzyme Q10 (COQ-10) 200 MG capsule Take 1 capsule by mouth Daily.      Cyanocobalamin (Vitamin B 12) 500 MCG tablet Take 1 tablet by mouth Daily.      empagliflozin (Jardiance) 10 MG tablet tablet Take 1 tablet by mouth Daily. 90 tablet 3    gabapentin (NEURONTIN) 100 MG capsule       Glucosamine-Chondroit-Vit C-Mn (GLUCOSAMINE CHONDR 1500 COMPLX PO) Take 1 capsule by mouth Daily.      glucose blood (Accu-Chek Guide) test strip Use to test blood sugar 3 times daily. Dx: E11.65 100 each 12    hydroCHLOROthiazide (HYDRODIURIL) 25 MG tablet Take 1 tablet by mouth Daily.      Insulin Pen Needle (ReliOn Pen Needles) 32G X 4 MM misc USE 1 TO INJECT INSULIN 4 TIMES DAILY 400 each 3    meloxicam (MOBIC) 15 MG tablet Daily.      metoprolol tartrate (LOPRESSOR) 100 MG tablet Take 1 tablet by mouth 2 (Two) Times a Day.      Multiple Vitamin (MULTIVITAMIN) capsule Take 1 capsule by mouth Daily.      mupirocin (BACTROBAN) 2 % ointment APPLY PEA SIZE AMOUNT TO QTIP AND SWAB LOWER PART OF NOSTRIL MAKING SURE TO TOUCH ALL SIDES DO SAME TO OTHER NOSTRIL PINCH NOSE TOGETHER AND MASSAGE FOR 1 MINUTE REPEAT 2 TIMES DAILY FOR 5 DAYS PRIOR TO SUREGERY      nitroglycerin (NITROSTAT) 0.4 MG SL tablet Place 1 tablet under the tongue Every 5 (Five) Minutes As Needed for Chest Pain. Take no more than 3 doses in 15 minutes. 25 tablet 12    nitroglycerin (NITROSTAT) 0.4 MG SL tablet Place 1 tablet under the tongue.      pantoprazole (PROTONIX) 40 MG EC tablet Daily.      potassium chloride 10 MEQ CR tablet Take 1 tablet by mouth Daily.      Probiotic Product capsule Take 1 tablet by mouth Daily.      saccharomyces boulardii (FLORASTOR) 250 MG capsule Take 1 capsule by mouth Daily.      Semaglutide, 2 MG/DOSE, (Ozempic, 2 MG/DOSE,) 8 MG/3ML solution pen-injector Inject 2 mg under the skin into the appropriate area as directed 1 (One) Time Per Week. DX/ e11.65 9 mL 3    Turmeric 500 MG  "tablet Take 1 tablet by mouth Daily.      vitamin C (ASCORBIC ACID) 500 MG tablet Take 2 tablets by mouth Every 12 (Twelve) Hours.       No current facility-administered medications on file prior to visit.       Objective   Pulse 69   Ht 182.9 cm (72\")   Wt 90.7 kg (200 lb)   BMI 27.12 kg/m²     Foot/Ankle Exam    GENERAL  Diabetic foot exam performed    Orientation:  AAOx3  Affect:  appropriate  Gait:  unimpaired  Assistance:  independent    VASCULAR     Right Foot Vascularity   Dorsalis pedis:  2+  Posterior tibial:  1+  Skin temperature:  warm  Edema grading:  None  CFT:  < 3 seconds  Pedal hair growth:  Present  Varicosities:  none     Left Foot Vascularity   Dorsalis pedis:  2+  Posterior tibial:  1+  Skin temperature:  warm  Edema grading:  None  CFT:  < 3 seconds  Pedal hair growth:  Present  Varicosities:  none     NEUROLOGIC     Right Foot Neurologic   Light touch sensation: normal     Left Foot Neurologic   Light touch sensation: normal    MUSCULOSKELETAL     Right Foot Musculoskeletal   Tenderness:  none       Left Foot Musculoskeletal   Tenderness:  none    DERMATOLOGIC      Right Foot Dermatologic   Skin  Positive for dryness.      Left Foot Dermatologic   Skin  Positive for dryness.      Right foot additional comments: The patient had 7 out of 10 on the [place] monofilament test to the right foot.      06/06/2023  Unable to palpate pedal pulse. 10 out of 10 on [place] testing to bilateral feet. Thickened, dystrophic, onychomycosis two, nails one through five on bilateral feet.     12/08/2023: Bilateral feet appear stable with no open wounds, no signs of active acute infection.     Left foot additional comments: The patient had 8 out of 10 on the [place] monofilament test to the left foot,      06/06/2023  Unable to palpate pedal pulse. 10 out of 10 on [place] testing to bilateral feet. Thickened, dystrophic, onychomycosis two, nails one through five on bilateral " feet.     12/08/2023: Bilateral feet appear stable with no open wounds, no signs of active acute infection.    [facility]    Assessment & Plan   Diagnoses and all orders for this visit:    1. Diabetic peripheral neuropathy associated with type 2 diabetes mellitus (Primary)    2. Type 2 diabetes mellitus with hyperglycemia, without long-term current use of insulin    3. Tendonitis, Achilles, left      DAXAP    1. Routine diabetic foot check.    The patient is present today for a routine diabetic foot check. He complains of left Achilles started bothering him yesterday,12/07/2023. He declined nail trimming today while in the office. We performed a [place]yl[place] Monofilament testing, on his left he is 9 out of 10 on his left and 10 out of 10 on his right. The patient has a scrape on his shin. I recommend him monitoring this, if it does not heal in the next weeks to give us a call.    Regarding left posterior heel pain I do feel symptoms are related to insertional Achilles tendinitis or bursitis.  Discussed pathophysiology.  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  Discussed with patient that this does put him at more of a risk for Achilles tendinitis or bursitis and he can apply ice and rest.  Advised patient to avoid strenuous activity and if symptoms do not improve in the next several weeks or if they worsen to call to be seen sooner as this could mean that he needs more aggressive treatment of the issue.     I do feel the patient is at mild risk for pedal complications related to diabetes. Explained importance of diabetic foot care, daily foot checks, and glycemic control. Patient should check both feet on a daily basis, monitor and control blood sugars, make sure that both feet and in between toes are towel dried after baths or showers. Avoid barefoot walking at all times.  Check shoes before putting them on.   Patient was given information on proper foot care. Call the office at the first signs of a wound or with signs of infection.    Follow-up  The patient will follow up in 1 year for routine diabetic foot check.    No orders of the defined types were placed in this encounter.      Transcribed from ambient dictation for TOÑITO Barcenas by Carmencita Fonseca, Quality .  12/08/23   15:01 EST    Patient or patient representative verbalized consent to the visit recording.  I have personally performed the services described in this document as transcribed by the above individual, and it is both accurate and complete.  TOÑITO Barcenas  12/11/2023  12:26 EST

## 2024-04-16 ENCOUNTER — OFFICE VISIT (OUTPATIENT)
Dept: SLEEP MEDICINE | Facility: CLINIC | Age: 74
End: 2024-04-16
Payer: MEDICARE

## 2024-04-16 VITALS
HEART RATE: 65 BPM | SYSTOLIC BLOOD PRESSURE: 126 MMHG | DIASTOLIC BLOOD PRESSURE: 65 MMHG | HEIGHT: 72 IN | OXYGEN SATURATION: 97 % | WEIGHT: 200 LBS | BODY MASS INDEX: 27.09 KG/M2

## 2024-04-16 DIAGNOSIS — G47.33 OSA ON CPAP: Primary | ICD-10-CM

## 2024-04-16 PROCEDURE — 1159F MED LIST DOCD IN RCRD: CPT | Performed by: INTERNAL MEDICINE

## 2024-04-16 PROCEDURE — 1160F RVW MEDS BY RX/DR IN RCRD: CPT | Performed by: INTERNAL MEDICINE

## 2024-04-16 PROCEDURE — 99213 OFFICE O/P EST LOW 20 MIN: CPT | Performed by: INTERNAL MEDICINE

## 2024-04-16 PROCEDURE — 3078F DIAST BP <80 MM HG: CPT | Performed by: INTERNAL MEDICINE

## 2024-04-16 PROCEDURE — 3074F SYST BP LT 130 MM HG: CPT | Performed by: INTERNAL MEDICINE

## 2024-04-16 PROCEDURE — G0463 HOSPITAL OUTPT CLINIC VISIT: HCPCS

## 2024-04-16 NOTE — PROGRESS NOTES
"  [id number] [facility], [address]  [place] [address]  Phone   Fax       SLEEP CLINIC FOLLOW UP PROGRESS NOTE.    Tom Steele  [id number]   1950  73 y.o.  male      PCP: Nellie Adrian MD      Date of visit: 4/16/2024    Chief Complaint   Patient presents with    Follow-up    Sleep Apnea       HPI:  This is a 73 y.o. years old patient is here for the management of obstructive sleep apnea.  Sleep apnea is severe in severity with a AHI of 82/hr. Patient is using positive airway pressure therapy with auto CPAP and the symptoms of sleep apnea have improved significantly on the therapy. Normally patient goes to bed at 11 PM and wakes up at 8 AM .  The patient wakes up 2 time(s) during the night and has no problem going back to sleep.  Feels refreshed after waking up.  He is retired he is also with his wife who also uses CPAP    Medications and allergies are reviewed by me and documented in the encounter.     SOCIAL (habits pertaining to sleep medicine)  History tobacco use: No  History of alcohol use: 0 per week  Caffeine use: 1    REVIEW OF SYSTEMS:   Pertaining positive symptoms are:  [place] Sleepiness Scale :Total score: 8   Shortness of breath with exertion      PHYSICAL EXAMINATION:  CONSTITUTIONAL:  Vitals:    04/16/24 0700   BP: 126/65   BP Location: Left arm   Patient Position: Sitting   Pulse: 65   SpO2: 97%   Weight: 90.7 kg (200 lb)   Height: 182.9 cm (72\")    Body mass index is 27.12 kg/m².   NOSE: nasal passages are clear, No deformities noted   RESP SYSTEM: Not in any respiratory distress, no chest deformities noted,   CARDIOVASULAR: No edema noted  NEURO: Oriented x 3, gait normal,  Mood and affect appeared appropriate      Data reviewed:  The Smart card downloaded on 4/16/2024 has been reviewed independently by me for compliance and discussed the data with the patient.   Compliance; 100%  More than 4 hr use, 100%  Average use of the device " 8 hours and 27 minutes per night  Residual AHI: 5 /hr (goal < 5.0 /hr)  Mask type: Fullface mask  Device: DreamStation 2  DME: [facility]      ASSESSMENT AND PLAN:  Obstructive sleep apnea ( G 47.33).  The symptoms of sleep apnea have improved with the device and the treatment.  Patient's compliance with the device is excellent for treatment of sleep apnea.  I have independently reviewed the smart card down load and discussed with the patient the download data and encouarged the patient to continue to use the device.The residual AHI is acceptable. The device is benefiting the patient and the device is medically necessary.  Without proper control of sleep apnea and good compliance there is a increased risk for hypertension, diabetes mellitus and nonrestorative sleep with hypersomnia which can increase risk for motor vehicle accidents.  Untreated sleep apnea is also a risk factor for development of atrial fibrillation, pulmonary hypertension, insulin resistance and stroke. The patient is also instructed to get the supplies from the DME company and and change them on a regular basis.  A prescription for supplies has been sent to the DME company.  I have also discussed the good sleep hygiene habits and adequate amount of sleep needed for good health.  Return in about 1 year (around 4/16/2025) for with smart card down load. . Patient's questions were answered.    4/16/2024  Odin Maldonado MD  Sleep Medicine.  Medical Director,   [facility].

## 2024-05-02 ENCOUNTER — TELEPHONE (OUTPATIENT)
Dept: ENDOCRINOLOGY | Facility: CLINIC | Age: 74
End: 2024-05-02
Payer: MEDICARE

## 2024-05-02 NOTE — TELEPHONE ENCOUNTER
Tried to call patient because the May 20th appt with Dr Anderson needs to be reschedule. No answer left vm asking to contact office to get reschedule at a different time/ date.   Hub may schedule.

## 2024-05-10 ENCOUNTER — LAB (OUTPATIENT)
Dept: LAB | Facility: HOSPITAL | Age: 74
End: 2024-05-10
Payer: MEDICARE

## 2024-05-10 ENCOUNTER — TRANSCRIBE ORDERS (OUTPATIENT)
Dept: LAB | Facility: HOSPITAL | Age: 74
End: 2024-05-10
Payer: MEDICARE

## 2024-05-10 DIAGNOSIS — I50.9 HEART FAILURE, UNSPECIFIED HF CHRONICITY, UNSPECIFIED HEART FAILURE TYPE: ICD-10-CM

## 2024-05-10 DIAGNOSIS — R94.39 ABNORMAL BALLISTOCARDIOGRAM: ICD-10-CM

## 2024-05-10 DIAGNOSIS — R94.39 ABNORMAL BALLISTOCARDIOGRAM: Primary | ICD-10-CM

## 2024-05-10 LAB
ANION GAP SERPL CALCULATED.3IONS-SCNC: 11.9 MMOL/L (ref 5–15)
BUN SERPL-MCNC: 20 MG/DL (ref 8–23)
BUN/CREAT SERPL: 21.7 (ref 7–25)
CALCIUM SPEC-SCNC: 9.4 MG/DL (ref 8.6–10.5)
CHLORIDE SERPL-SCNC: 105 MMOL/L (ref 98–107)
CO2 SERPL-SCNC: 25.1 MMOL/L (ref 22–29)
CREAT SERPL-MCNC: 0.92 MG/DL (ref 0.76–1.27)
EGFRCR SERPLBLD CKD-EPI 2021: 87.8 ML/MIN/1.73
GLUCOSE SERPL-MCNC: 83 MG/DL (ref 65–99)
NT-PROBNP SERPL-MCNC: 211 PG/ML (ref 0–900)
POTASSIUM SERPL-SCNC: 4 MMOL/L (ref 3.5–5.2)
SODIUM SERPL-SCNC: 142 MMOL/L (ref 136–145)

## 2024-05-10 PROCEDURE — 80048 BASIC METABOLIC PNL TOTAL CA: CPT

## 2024-05-10 PROCEDURE — 36415 COLL VENOUS BLD VENIPUNCTURE: CPT

## 2024-05-10 PROCEDURE — 83880 ASSAY OF NATRIURETIC PEPTIDE: CPT

## 2024-05-13 ENCOUNTER — LAB (OUTPATIENT)
Dept: LAB | Facility: HOSPITAL | Age: 74
End: 2024-05-13
Payer: MEDICARE

## 2024-05-13 DIAGNOSIS — E11.65 TYPE 2 DIABETES MELLITUS WITH HYPERGLYCEMIA, WITH LONG-TERM CURRENT USE OF INSULIN: ICD-10-CM

## 2024-05-13 DIAGNOSIS — Z79.4 TYPE 2 DIABETES MELLITUS WITH HYPERGLYCEMIA, WITH LONG-TERM CURRENT USE OF INSULIN: ICD-10-CM

## 2024-05-13 LAB
ALBUMIN SERPL-MCNC: 4.4 G/DL (ref 3.5–5.2)
ALBUMIN UR-MCNC: 5.8 MG/DL
ALBUMIN/GLOB SERPL: 2 G/DL
ALP SERPL-CCNC: 64 U/L (ref 39–117)
ALT SERPL W P-5'-P-CCNC: 24 U/L (ref 1–41)
ANION GAP SERPL CALCULATED.3IONS-SCNC: 7 MMOL/L (ref 5–15)
AST SERPL-CCNC: 18 U/L (ref 1–40)
BILIRUB SERPL-MCNC: 0.5 MG/DL (ref 0–1.2)
BUN SERPL-MCNC: 15 MG/DL (ref 8–23)
BUN/CREAT SERPL: 16.5 (ref 7–25)
CALCIUM SPEC-SCNC: 9.2 MG/DL (ref 8.6–10.5)
CHLORIDE SERPL-SCNC: 105 MMOL/L (ref 98–107)
CHOLEST SERPL-MCNC: 95 MG/DL (ref 0–200)
CO2 SERPL-SCNC: 30 MMOL/L (ref 22–29)
CREAT SERPL-MCNC: 0.91 MG/DL (ref 0.76–1.27)
CREAT UR-MCNC: 94.7 MG/DL
EGFRCR SERPLBLD CKD-EPI 2021: 89 ML/MIN/1.73
GLOBULIN UR ELPH-MCNC: 2.2 GM/DL
GLUCOSE SERPL-MCNC: 96 MG/DL (ref 65–99)
HBA1C MFR BLD: 5.4 % (ref 4.8–5.6)
HDLC SERPL-MCNC: 30 MG/DL (ref 40–60)
LDLC SERPL CALC-MCNC: 38 MG/DL (ref 0–100)
LDLC/HDLC SERPL: 1.1 {RATIO}
MICROALBUMIN/CREAT UR: 61.2 MG/G (ref 0–29)
POTASSIUM SERPL-SCNC: 4.4 MMOL/L (ref 3.5–5.2)
PROT SERPL-MCNC: 6.6 G/DL (ref 6–8.5)
SODIUM SERPL-SCNC: 142 MMOL/L (ref 136–145)
TRIGL SERPL-MCNC: 160 MG/DL (ref 0–150)
VLDLC SERPL-MCNC: 27 MG/DL (ref 5–40)

## 2024-05-13 PROCEDURE — 82570 ASSAY OF URINE CREATININE: CPT

## 2024-05-13 PROCEDURE — 83036 HEMOGLOBIN GLYCOSYLATED A1C: CPT

## 2024-05-13 PROCEDURE — 36415 COLL VENOUS BLD VENIPUNCTURE: CPT

## 2024-05-13 PROCEDURE — 82043 UR ALBUMIN QUANTITATIVE: CPT

## 2024-05-13 PROCEDURE — 80061 LIPID PANEL: CPT

## 2024-05-13 PROCEDURE — 80053 COMPREHEN METABOLIC PANEL: CPT

## 2024-05-30 ENCOUNTER — OFFICE VISIT (OUTPATIENT)
Dept: ENDOCRINOLOGY | Facility: CLINIC | Age: 74
End: 2024-05-30
Payer: MEDICARE

## 2024-05-30 VITALS
OXYGEN SATURATION: 97 % | SYSTOLIC BLOOD PRESSURE: 118 MMHG | HEIGHT: 72 IN | DIASTOLIC BLOOD PRESSURE: 70 MMHG | BODY MASS INDEX: 26.14 KG/M2 | HEART RATE: 75 BPM | WEIGHT: 193 LBS

## 2024-05-30 DIAGNOSIS — E11.65 TYPE 2 DIABETES MELLITUS WITH HYPERGLYCEMIA, WITH LONG-TERM CURRENT USE OF INSULIN: Primary | ICD-10-CM

## 2024-05-30 DIAGNOSIS — Z79.4 TYPE 2 DIABETES MELLITUS WITH HYPERGLYCEMIA, WITH LONG-TERM CURRENT USE OF INSULIN: Primary | ICD-10-CM

## 2024-05-30 DIAGNOSIS — I10 ESSENTIAL HYPERTENSION: ICD-10-CM

## 2024-05-30 DIAGNOSIS — E78.2 MIXED HYPERLIPIDEMIA: ICD-10-CM

## 2024-05-30 LAB — GLUCOSE BLDC GLUCOMTR-MCNC: 101 MG/DL (ref 70–105)

## 2024-05-30 PROCEDURE — 82948 REAGENT STRIP/BLOOD GLUCOSE: CPT | Performed by: INTERNAL MEDICINE

## 2024-05-30 RX ORDER — SACUBITRIL AND VALSARTAN 49; 51 MG/1; MG/1
TABLET, FILM COATED ORAL
COMMUNITY
Start: 2024-05-13

## 2024-05-30 RX ORDER — AMLODIPINE BESYLATE 5 MG/1
TABLET ORAL
COMMUNITY
Start: 2024-05-13 | End: 2024-05-30 | Stop reason: SDUPTHER

## 2024-05-30 RX ORDER — BENAZEPRIL HYDROCHLORIDE 10 MG/1
10 TABLET ORAL DAILY
Qty: 30 TABLET | Refills: 11 | Status: SHIPPED | OUTPATIENT
Start: 2024-05-30 | End: 2024-05-30

## 2024-05-30 RX ORDER — BENAZEPRIL HYDROCHLORIDE 10 MG/1
10 TABLET ORAL DAILY
COMMUNITY
Start: 2024-04-11 | End: 2024-05-30 | Stop reason: SDUPTHER

## 2024-05-30 RX ORDER — AMLODIPINE BESYLATE 5 MG/1
5 TABLET ORAL DAILY
Qty: 10 TABLET | Refills: 3 | Status: SHIPPED | OUTPATIENT
Start: 2024-05-30

## 2024-05-30 RX ORDER — BIOTIN 10 MG
10000 TABLET ORAL DAILY
COMMUNITY

## 2024-05-30 RX ORDER — ATORVASTATIN CALCIUM 40 MG/1
40 TABLET, FILM COATED ORAL DAILY
Qty: 90 TABLET | Refills: 4 | Status: SHIPPED | OUTPATIENT
Start: 2024-05-30

## 2024-05-30 RX ORDER — SEMAGLUTIDE 2.68 MG/ML
2 INJECTION, SOLUTION SUBCUTANEOUS WEEKLY
Qty: 9 ML | Refills: 3 | Status: SHIPPED | OUTPATIENT
Start: 2024-05-30

## 2024-05-30 NOTE — PROGRESS NOTES
Endocrine Progress Note Outpatient     Patient Care Team:  Nellie Adrian MD as PCP - General (Family Medicine)    Chief Complaint: Follow-up type 2 diabetes    HPI: 73-year-old male with type 2 diabetes, hypertension, hyperlipidemia CAD is here for follow-up.    For type 2 diabetes: Diagnosed in August 2021, currently on Ozempic 2.0 mg subcu weekly and Jardiance 10 mg once a day.  He is tolerating his medications well.  Blood sugars reviewed from his glucometer and they are running mostly less than 120.    Hypertension: Fair control    Hyperlipidemia: Currently on atorvastatin    CAD: Status post stent placement.    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    NEREIDA (obstructive sleep apnea)     Type 2 diabetes mellitus        Social History     Socioeconomic History    Marital status:      Spouse name: Colette    Number of children: 4    Years of education: 14   Tobacco Use    Smoking status: Never    Smokeless tobacco: Never   Vaping Use    Vaping status: Never Used   Substance and Sexual Activity    Alcohol use: Not Currently    Drug use: Never    Sexual activity: Not Currently     Partners: Female       Family History   Problem Relation Age of Onset    Heart disease Mother     Heart disease Father     Diabetes Sister     Hypertension Sister     Thyroid disease Sister     Sleep apnea Neg Hx        No Known Allergies    ROS:   Constitutional:  Denies fatigue, tiredness.    Eyes:  Denies change in visual acuity   HENT:  Denies nasal congestion or sore throat   Respiratory: denies cough, shortness of breath.   Cardiovascular:  denies chest pain, edema   GI:  Denies abdominal pain, nausea, vomiting.   Musculoskeletal:  Denies back pain or joint pain   Integument:  Denies dry skin and rash    Neurologic:  Denies headache, focal weakness or sensory changes   Endocrine:  Denies polyuria or polydipsia   Psychiatric:  Denies depression or anxiety      Vitals:    05/30/24 0911   BP: 118/70   Pulse: 75   SpO2: 97%     Body mass index is 26.18 kg/m².     Physical Exam:  GEN: NAD, conversant  EYES: EOMI,   NECK: no thyromegaly,   CV: RRR,   LUNG: CTA  PSYCH: AOX3, appropriate mood, affect normal      Results Review:     I reviewed the patient's new clinical results.    Lab Results   Component Value Date    HGBA1C 5.40 05/13/2024    HGBA1C 5.30 11/20/2023    HGBA1C 5.50 09/06/2023      Lab Results   Component Value Date    GLUCOSE 96 05/13/2024    BUN 15 05/13/2024    CREATININE 0.91 05/13/2024    EGFRIFNONA 71 08/03/2021    BCR 16.5 05/13/2024    K 4.4 05/13/2024    CO2 30.0 (H) 05/13/2024    CALCIUM 9.2 05/13/2024    ALBUMIN 4.4 05/13/2024    AST 18 05/13/2024    ALT 24 05/13/2024    CHOL 95 05/13/2024    TRIG 160 (H) 05/13/2024    LDL 38 05/13/2024    HDL 30 (L) 05/13/2024     Lab Results   Component Value Date    TSH 1.850 08/02/2021         Medication Review: Reviewed.       Current Outpatient Medications:     amLODIPine (NORVASC) 5 MG tablet, Take 1 tablet by mouth Daily., Disp: 10 tablet, Rfl: 3    aspirin 81 MG tablet, Take 1 tablet by mouth Daily., Disp: , Rfl:     atorvastatin (LIPITOR) 40 MG tablet, Take 1 tablet by mouth Daily., Disp: 90 tablet, Rfl: 4    Biotin 10 MG tablet, Take 10,000 mcg by mouth Daily., Disp: , Rfl:     Cetirizine HCl 10 MG tablet dispersible, 2 (two) times a day., Disp: , Rfl:     Cholecalciferol 50 MCG (2000 UT) capsule, Take 1 capsule by mouth Daily., Disp: , Rfl:     Chromium-Cinnamon  MCG-MG capsule, Take 1 capsule by mouth 2 (two) times a day., Disp: , Rfl:     Cinnamon 500 MG capsule, Take  by mouth., Disp: , Rfl:     Cobalamine Combinations (VITAMIN B12-FOLIC ACID) 500-400 MCG tablet, Take 1 tablet by mouth Daily., Disp: , Rfl:     Coenzyme Q10 (COQ-10) 200 MG  capsule, Take 1 capsule by mouth Daily., Disp: , Rfl:     Cyanocobalamin (Vitamin B 12) 500 MCG tablet, Take 1 tablet by mouth Daily., Disp: , Rfl:     empagliflozin (Jardiance) 10 MG tablet tablet, Take 1 tablet by mouth Daily., Disp: 90 tablet, Rfl: 3    Entresto 49-51 MG tablet, , Disp: , Rfl:     gabapentin (NEURONTIN) 100 MG capsule, , Disp: , Rfl:     Glucosamine-Chondroit-Vit C-Mn (GLUCOSAMINE CHONDR 1500 COMPLX PO), Take 1 capsule by mouth Daily., Disp: , Rfl:     glucose blood (Accu-Chek Guide) test strip, Use to test blood sugar 3 times daily. Dx: E11.65, Disp: 100 each, Rfl: 12    hydroCHLOROthiazide (HYDRODIURIL) 25 MG tablet, Take 1 tablet by mouth Daily., Disp: , Rfl:     Insulin Pen Needle (ReliOn Pen Needles) 32G X 4 MM misc, USE 1 TO INJECT INSULIN 4 TIMES DAILY, Disp: 400 each, Rfl: 3    meloxicam (MOBIC) 15 MG tablet, Daily., Disp: , Rfl:     metoprolol tartrate (LOPRESSOR) 100 MG tablet, Take 1 tablet by mouth 2 (Two) Times a Day., Disp: , Rfl:     Multiple Vitamin (MULTIVITAMIN) capsule, Take 1 capsule by mouth Daily., Disp: , Rfl:     mupirocin (BACTROBAN) 2 % ointment, APPLY PEA SIZE AMOUNT TO QTIP AND SWAB LOWER PART OF NOSTRIL MAKING SURE TO TOUCH ALL SIDES DO SAME TO OTHER NOSTRIL PINCH NOSE TOGETHER AND MASSAGE FOR 1 MINUTE REPEAT 2 TIMES DAILY FOR 5 DAYS PRIOR TO SUREGERY, Disp: , Rfl:     nitroglycerin (NITROSTAT) 0.4 MG SL tablet, Place 1 tablet under the tongue Every 5 (Five) Minutes As Needed for Chest Pain. Take no more than 3 doses in 15 minutes., Disp: 25 tablet, Rfl: 12    pantoprazole (PROTONIX) 40 MG EC tablet, Daily., Disp: , Rfl:     potassium chloride 10 MEQ CR tablet, Take 1 tablet by mouth Daily., Disp: , Rfl:     Probiotic Product capsule, Take 1 tablet by mouth Daily., Disp: , Rfl:     saccharomyces boulardii (FLORASTOR) 250 MG capsule, Take 1 capsule by mouth Daily., Disp: , Rfl:     Semaglutide, 2 MG/DOSE, (Ozempic, 2 MG/DOSE,) 8 MG/3ML solution pen-injector, Inject 2  mg under the skin into the appropriate area as directed 1 (One) Time Per Week. DX/ e11.65, Disp: 9 mL, Rfl: 3    Turmeric 500 MG tablet, Take 1 tablet by mouth Daily., Disp: , Rfl:     vitamin C (ASCORBIC ACID) 500 MG tablet, Take 2 tablets by mouth Every 12 (Twelve) Hours., Disp: , Rfl:     nitroglycerin (NITROSTAT) 0.4 MG SL tablet, Place 1 tablet under the tongue., Disp: , Rfl:       Assessment & Plan   Diabetes mellitus type 2 with Hyperglycemia: Well-controlled with normal A1c at 5.4%.  Encouraged to continue to work on diet and activity.  We will continue Ozempic 2 mg once a week with Jardiance 10 mg once a day.    Hypertension: Fair control, continue current medication.  He is now on Entresto and has stopped BenzePrO.  Advised to stop BenzePrO completely and do not take it while on Entresto.  He verbalized understanding.    Hyperlipidemia: Well-controlled, continue current medications.    Assessment and plan from November 27, 2023 reviewed and updated.            Bryce Anderson MD FACE.

## 2024-10-15 RX ORDER — SEMAGLUTIDE 2.68 MG/ML
INJECTION, SOLUTION SUBCUTANEOUS
Qty: 9 ML | Refills: 3 | Status: SHIPPED | OUTPATIENT
Start: 2024-10-15

## 2024-11-25 ENCOUNTER — LAB (OUTPATIENT)
Dept: LAB | Facility: HOSPITAL | Age: 74
End: 2024-11-25
Payer: MEDICARE

## 2024-11-25 DIAGNOSIS — E11.65 TYPE 2 DIABETES MELLITUS WITH HYPERGLYCEMIA, WITH LONG-TERM CURRENT USE OF INSULIN: ICD-10-CM

## 2024-11-25 DIAGNOSIS — Z79.4 TYPE 2 DIABETES MELLITUS WITH HYPERGLYCEMIA, WITH LONG-TERM CURRENT USE OF INSULIN: ICD-10-CM

## 2024-11-25 LAB
ALBUMIN SERPL-MCNC: 4.1 G/DL (ref 3.5–5.2)
ALBUMIN UR-MCNC: 17.6 MG/DL
ALBUMIN/GLOB SERPL: 1.8 G/DL
ALP SERPL-CCNC: 79 U/L (ref 39–117)
ALT SERPL W P-5'-P-CCNC: 18 U/L (ref 1–41)
ANION GAP SERPL CALCULATED.3IONS-SCNC: 9 MMOL/L (ref 5–15)
AST SERPL-CCNC: 17 U/L (ref 1–40)
BILIRUB SERPL-MCNC: 0.4 MG/DL (ref 0–1.2)
BUN SERPL-MCNC: 16 MG/DL (ref 8–23)
BUN/CREAT SERPL: 18.4 (ref 7–25)
CALCIUM SPEC-SCNC: 9.2 MG/DL (ref 8.6–10.5)
CHLORIDE SERPL-SCNC: 105 MMOL/L (ref 98–107)
CHOLEST SERPL-MCNC: 139 MG/DL (ref 0–200)
CO2 SERPL-SCNC: 27 MMOL/L (ref 22–29)
CREAT SERPL-MCNC: 0.87 MG/DL (ref 0.76–1.27)
CREAT UR-MCNC: 169.9 MG/DL
EGFRCR SERPLBLD CKD-EPI 2021: 90.5 ML/MIN/1.73
GLOBULIN UR ELPH-MCNC: 2.3 GM/DL
GLUCOSE SERPL-MCNC: 87 MG/DL (ref 65–99)
HBA1C MFR BLD: 5.4 % (ref 4.8–5.6)
HDLC SERPL-MCNC: 35 MG/DL (ref 40–60)
LDLC SERPL CALC-MCNC: 77 MG/DL (ref 0–100)
LDLC/HDLC SERPL: 2.09 {RATIO}
MICROALBUMIN/CREAT UR: 103.6 MG/G (ref 0–29)
POTASSIUM SERPL-SCNC: 3.9 MMOL/L (ref 3.5–5.2)
PROT SERPL-MCNC: 6.4 G/DL (ref 6–8.5)
SODIUM SERPL-SCNC: 141 MMOL/L (ref 136–145)
TRIGL SERPL-MCNC: 154 MG/DL (ref 0–150)
VLDLC SERPL-MCNC: 27 MG/DL (ref 5–40)

## 2024-11-25 PROCEDURE — 80053 COMPREHEN METABOLIC PANEL: CPT

## 2024-11-25 PROCEDURE — 82043 UR ALBUMIN QUANTITATIVE: CPT

## 2024-11-25 PROCEDURE — 82570 ASSAY OF URINE CREATININE: CPT

## 2024-11-25 PROCEDURE — 83036 HEMOGLOBIN GLYCOSYLATED A1C: CPT

## 2024-11-25 PROCEDURE — 80061 LIPID PANEL: CPT

## 2024-11-25 PROCEDURE — 36415 COLL VENOUS BLD VENIPUNCTURE: CPT

## 2024-12-06 ENCOUNTER — OFFICE VISIT (OUTPATIENT)
Age: 74
End: 2024-12-06
Payer: MEDICARE

## 2024-12-06 VITALS — WEIGHT: 198 LBS | HEIGHT: 72 IN | OXYGEN SATURATION: 97 % | HEART RATE: 53 BPM | BODY MASS INDEX: 26.82 KG/M2

## 2024-12-06 DIAGNOSIS — B35.1 ONYCHOMYCOSIS: ICD-10-CM

## 2024-12-06 DIAGNOSIS — E11.42 DIABETIC PERIPHERAL NEUROPATHY ASSOCIATED WITH TYPE 2 DIABETES MELLITUS: Primary | ICD-10-CM

## 2024-12-06 DIAGNOSIS — M79.675 PAIN IN TOES OF BOTH FEET: ICD-10-CM

## 2024-12-06 DIAGNOSIS — E11.65 TYPE 2 DIABETES MELLITUS WITH HYPERGLYCEMIA, WITHOUT LONG-TERM CURRENT USE OF INSULIN: ICD-10-CM

## 2024-12-06 DIAGNOSIS — M79.674 PAIN IN TOES OF BOTH FEET: ICD-10-CM

## 2024-12-06 DIAGNOSIS — R26.81 UNSTEADINESS ON FEET: ICD-10-CM

## 2024-12-06 NOTE — PROGRESS NOTES
12/06/2024  Foot and Ankle Surgery - Established Patient/Follow-up  Provider: TOÑITO Monaco   Location: [facility]    Subjective:  Tom Steele is a 74 y.o. male.     Chief Complaint   Patient presents with   • Right Foot - Follow-up, Peripheral Neuropathy     Diabetic foot check    • Left Foot - Follow-up, Peripheral Neuropathy     Diabetic foot check    • Follow-up     Nellie Adrian, [facility] - 7/20/23       HPI: Returns for routine diabetic foot check.  He denies any new problems with his feet.  Denies any new burning or tingling.  He reports he has been using an over-the-counter topical medication for toenail fungus and has had some improvement to nail appearance.  He request nail debridement today.  He would like to continue with yearly diabetic foot checks.  He reports his last A1c was 5.3%.  Patient denies any new pain to his feet or lower extremities.  He denies cramping to his calves with walking or leg elevation.    No Known Allergies    Current Outpatient Medications on File Prior to Visit   Medication Sig Dispense Refill   • amLODIPine (NORVASC) 5 MG tablet Take 1 tablet by mouth Daily. 10 tablet 3   • aspirin 81 MG tablet Take 1 tablet by mouth Daily.     • atorvastatin (LIPITOR) 40 MG tablet Take 1 tablet by mouth Daily. 90 tablet 4   • Biotin 10 MG tablet Take 10,000 mcg by mouth Daily.     • Cetirizine HCl 10 MG tablet dispersible 2 (two) times a day.     • Cholecalciferol 50 MCG (2000 UT) capsule Take 1 capsule by mouth Daily.     • Chromium-Cinnamon  MCG-MG capsule Take 1 capsule by mouth 2 (two) times a day.     • Cinnamon 500 MG capsule Take  by mouth.     • Cobalamine Combinations (VITAMIN B12-FOLIC ACID) 500-400 MCG tablet Take 1 tablet by mouth Daily.     • Coenzyme Q10 (COQ-10) 200 MG capsule Take 1 capsule by mouth Daily.     • Cyanocobalamin (Vitamin B 12) 500 MCG tablet Take 1 tablet by mouth Daily.     • docusate sodium (COLACE) 50 MG capsule      •  empagliflozin (Jardiance) 10 MG tablet tablet Take 1 tablet by mouth Daily. 90 tablet 3   • Entresto 49-51 MG tablet      • gabapentin (NEURONTIN) 100 MG capsule      • Glucosamine-Chondroit-Vit C-Mn (GLUCOSAMINE CHONDR 1500 COMPLX PO) Take 1 capsule by mouth Daily.     • glucose blood (Accu-Chek Guide) test strip Use to test blood sugar 3 times daily. Dx: E11.65 100 each 12   • hydroCHLOROthiazide (HYDRODIURIL) 25 MG tablet Take 1 tablet by mouth Daily.     • Insulin Pen Needle (ReliOn Pen Needles) 32G X 4 MM misc USE 1 TO INJECT INSULIN 4 TIMES DAILY 400 each 3   • meloxicam (MOBIC) 15 MG tablet Daily.     • metoprolol tartrate (LOPRESSOR) 100 MG tablet Take 1 tablet by mouth 2 (Two) Times a Day.     • Multiple Vitamin (MULTIVITAMIN) capsule Take 1 capsule by mouth Daily.     • mupirocin (BACTROBAN) 2 % ointment APPLY PEA SIZE AMOUNT TO QTIP AND SWAB LOWER PART OF NOSTRIL MAKING SURE TO TOUCH ALL SIDES DO SAME TO OTHER NOSTRIL PINCH NOSE TOGETHER AND MASSAGE FOR 1 MINUTE REPEAT 2 TIMES DAILY FOR 5 DAYS PRIOR TO SUREGERY     • nitroglycerin (NITROSTAT) 0.4 MG SL tablet Place 1 tablet under the tongue Every 5 (Five) Minutes As Needed for Chest Pain. Take no more than 3 doses in 15 minutes. 25 tablet 12   • Ozempic, 2 MG/DOSE, 8 MG/3ML solution pen-injector INJECT 2MG SUBCUTANEOUSLY  INTO THE APPROPRIATE AREA  AS DIRECTED ONCE WEEKLY 9 mL 3   • pantoprazole (PROTONIX) 40 MG EC tablet Daily.     • potassium chloride 10 MEQ CR tablet Take 1 tablet by mouth Daily.     • Probiotic Product capsule Take 1 tablet by mouth Daily.     • saccharomyces boulardii (FLORASTOR) 250 MG capsule Take 1 capsule by mouth Daily.     • Turmeric 500 MG tablet Take 1 tablet by mouth Daily.     • vitamin C (ASCORBIC ACID) 500 MG tablet Take 2 tablets by mouth Every 12 (Twelve) Hours.     • nitroglycerin (NITROSTAT) 0.4 MG SL tablet Place 1 tablet under the tongue.       No current facility-administered medications on file prior to visit.  "      Objective   Pulse 53   Ht 182.9 cm (72\")   Wt 89.8 kg (198 lb)   SpO2 97%   BMI 26.85 kg/m²     Foot/Ankle Exam    GENERAL  Diabetic foot exam performed    Appearance:  appears stated age  Orientation:  AAOx3  Affect:  appropriate  Gait:  unimpaired  Assistance:  independent  Right shoe gear: casual shoe and sock  Left shoe gear: casual shoe and sock    VASCULAR     Right Foot Vascularity   Dorsalis pedis:  1+  Skin temperature:  warm  Edema grading:  None  CFT:  < 3 seconds  Pedal hair growth:  Present  Varicosities:  none     Left Foot Vascularity   Dorsalis pedis:  1+  Skin temperature:  warm  Edema grading:  None  CFT:  < 3 seconds  Pedal hair growth:  Present  Varicosities:  none     NEUROLOGIC     Right Foot Neurologic   Light touch sensation: normal  Protective Sensation using [place] Monofilament:   Sites intact: 10  Sites tested: 10     Left Foot Neurologic   Light touch sensation: normal  Protective Sensation using [place] Monofilament:   Sites intact: 9  Sites tested: 10    MUSCULOSKELETAL     Right Foot Musculoskeletal   Ecchymosis:  none  Tenderness:  toenail problem       Left Foot Musculoskeletal   Ecchymosis:  none  Tenderness:  toenail problem    DERMATOLOGIC      Right Foot Dermatologic   Skin  Positive for dryness and skin changes.   Nails  1.  Positive for elongated, abnormal thickness and dystrophic nail.  2.  Positive for elongated, abnormal thickness and dystrophic nail.  3.  Positive for elongated, abnormal thickness and dystrophic nail.  4.  Positive for elongated, abnormal thickness and dystrophic nail.  5.  Positive for elongated, abnormal thickness and dystrophic nail.     Left Foot Dermatologic   Skin  Positive for dryness and skin changes.   Nails  1.  Positive for elongated, abnormal thickness and dystrophic nail.  2.  Positive for elongated, abnormal thickness and dystrophic nail.  3.  Positive for elongated, abnormal thickness and dystrophic nail.  4.  " Positive for elongated, abnormally thick and dystrophic nail.  5.  Positive for elongated, abnormally thick and dystrophic nail.    Assessment & Plan   Diagnoses and all orders for this visit:    1. Diabetic peripheral neuropathy associated with type 2 diabetes mellitus (Primary)    2. Type 2 diabetes mellitus with hyperglycemia, without long-term current use of insulin    3. Onychomycosis    4. Pain in toes of both feet    5. Unsteadiness on feet      On exam bilateral feet appear stable with no open wounds or signs of active acute infection.  I do feel he is at mild risk for pedal complication related to diabetes.Explained importance of diabetic foot care, daily foot checks, and glycemic control. Patient should check both feet on a daily basis, monitor and control blood sugars, make sure that both feet and in between toes are towel dried after baths or showers. Avoid barefoot walking at all times.  I discussed wearing white socks and checking shoes before wearing them. Patient was given information on proper foot care. Call the office at the first signs of a wound or with signs of infection.     Nail debridement: Both feet x10    Consent and time out was performed before proceeding with the procedure. Nails were debrided with a nail nipper without complication.  No anesthesia was required.  Indications for procedure were thickened, dystrophic, and fungal appearing nails which are difficult to trim.  Proper self-care and technique was discussed with patient.  Patient was stable after procedure.     Patient will follow-up in 1 year for routine diabetic foot check he is encouraged to call with any questions or concerns should they occur before scheduled appointment.    No orders of the defined types were placed in this encounter.         Note is dictated utilizing voice recognition software. Unfortunately this leads to occasional typographical errors. I apologize in advance if the situation occurs. If questions occur  please do not hesitate to call our office.

## 2024-12-09 ENCOUNTER — OFFICE VISIT (OUTPATIENT)
Dept: ENDOCRINOLOGY | Facility: CLINIC | Age: 74
End: 2024-12-09
Payer: MEDICARE

## 2024-12-09 VITALS
HEIGHT: 72 IN | DIASTOLIC BLOOD PRESSURE: 70 MMHG | BODY MASS INDEX: 27.09 KG/M2 | OXYGEN SATURATION: 97 % | WEIGHT: 200 LBS | SYSTOLIC BLOOD PRESSURE: 127 MMHG | HEART RATE: 73 BPM

## 2024-12-09 DIAGNOSIS — I10 ESSENTIAL HYPERTENSION: ICD-10-CM

## 2024-12-09 DIAGNOSIS — E11.21 DIABETIC NEPHROPATHY ASSOCIATED WITH TYPE 2 DIABETES MELLITUS: ICD-10-CM

## 2024-12-09 DIAGNOSIS — Z79.4 TYPE 2 DIABETES MELLITUS WITH HYPERGLYCEMIA, WITH LONG-TERM CURRENT USE OF INSULIN: Primary | ICD-10-CM

## 2024-12-09 DIAGNOSIS — E78.2 MIXED HYPERLIPIDEMIA: ICD-10-CM

## 2024-12-09 DIAGNOSIS — E11.65 TYPE 2 DIABETES MELLITUS WITH HYPERGLYCEMIA, WITH LONG-TERM CURRENT USE OF INSULIN: Primary | ICD-10-CM

## 2024-12-09 LAB — GLUCOSE BLDC GLUCOMTR-MCNC: 115 MG/DL (ref 70–105)

## 2024-12-09 PROCEDURE — 3078F DIAST BP <80 MM HG: CPT | Performed by: INTERNAL MEDICINE

## 2024-12-09 PROCEDURE — 99214 OFFICE O/P EST MOD 30 MIN: CPT | Performed by: INTERNAL MEDICINE

## 2024-12-09 PROCEDURE — 1160F RVW MEDS BY RX/DR IN RCRD: CPT | Performed by: INTERNAL MEDICINE

## 2024-12-09 PROCEDURE — G2211 COMPLEX E/M VISIT ADD ON: HCPCS | Performed by: INTERNAL MEDICINE

## 2024-12-09 PROCEDURE — 1159F MED LIST DOCD IN RCRD: CPT | Performed by: INTERNAL MEDICINE

## 2024-12-09 PROCEDURE — 82948 REAGENT STRIP/BLOOD GLUCOSE: CPT | Performed by: INTERNAL MEDICINE

## 2024-12-09 PROCEDURE — 3074F SYST BP LT 130 MM HG: CPT | Performed by: INTERNAL MEDICINE

## 2024-12-09 PROCEDURE — 3044F HG A1C LEVEL LT 7.0%: CPT | Performed by: INTERNAL MEDICINE

## 2024-12-09 RX ORDER — SEMAGLUTIDE 2.68 MG/ML
2 INJECTION, SOLUTION SUBCUTANEOUS WEEKLY
Qty: 9 ML | Refills: 3 | Status: SHIPPED | OUTPATIENT
Start: 2024-12-09

## 2024-12-09 RX ORDER — ATORVASTATIN CALCIUM 40 MG/1
40 TABLET, FILM COATED ORAL DAILY
Qty: 90 TABLET | Refills: 4 | Status: SHIPPED | OUTPATIENT
Start: 2024-12-09

## 2024-12-09 RX ORDER — AMLODIPINE BESYLATE 5 MG/1
5 TABLET ORAL DAILY
Qty: 90 TABLET | Refills: 3 | Status: SHIPPED | OUTPATIENT
Start: 2024-12-09 | End: 2024-12-12 | Stop reason: SDUPTHER

## 2024-12-09 NOTE — PROGRESS NOTES
Endocrine Progress Note Outpatient     Patient Care Team:  Nellie Adrian MD as PCP - General (Family Medicine)    Chief Complaint: Follow-up type 2 diabetes    HPI: 74-year-old male with type 2 diabetes, hypertension, hyperlipidemia CAD is here for follow-up.    For type 2 diabetes: Diagnosed in August 2021, currently on Ozempic 2.0 mg subcu weekly and Jardiance 10 mg once a day.  He is tolerating his medications well.  I reviewed his glucometer, his most of his blood sugars are between 70-1 30.    Hypertension: Fair control    Hyperlipidemia: Currently on atorvastatin    CAD: Status post stent placement.    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    NEREIDA (obstructive sleep apnea)     Type 2 diabetes mellitus        Social History     Socioeconomic History    Marital status:      Spouse name: Colette    Number of children: 4    Years of education: 14   Tobacco Use    Smoking status: Never    Smokeless tobacco: Never   Vaping Use    Vaping status: Never Used   Substance and Sexual Activity    Alcohol use: Not Currently    Drug use: Never    Sexual activity: Not Currently     Partners: Female       Family History   Problem Relation Age of Onset    Heart disease Mother     Heart disease Father     Diabetes Sister     Hypertension Sister     Thyroid disease Sister     Sleep apnea Neg Hx        No Known Allergies    ROS:   Constitutional:  Denies fatigue, tiredness.    Eyes:  Denies change in visual acuity   HENT:  Denies nasal congestion or sore throat   Respiratory: denies cough, shortness of breath.   Cardiovascular:  denies chest pain, edema   GI:  Denies abdominal pain, nausea, vomiting.   Musculoskeletal:  Denies back pain or joint pain   Integument:  Denies dry skin and rash   Neurologic:   Denies headache, focal weakness or sensory changes   Endocrine:  Denies polyuria or polydipsia   Psychiatric:  Denies depression or anxiety      Vitals:    12/09/24 0839   BP: 127/70   Pulse: 73   SpO2: 97%     Body mass index is 27.12 kg/m².     Physical Exam:  GEN: NAD, conversant  EYES: EOMI,   NECK: no thyromegaly,   CV: RRR,   LUNG: CTA  PSYCH: AOX3, appropriate mood, affect normal      Results Review:     I reviewed the patient's new clinical results.    Lab Results   Component Value Date    HGBA1C 5.40 11/25/2024    HGBA1C 5.40 05/13/2024    HGBA1C 5.30 11/20/2023      Lab Results   Component Value Date    GLUCOSE 87 11/25/2024    BUN 16 11/25/2024    CREATININE 0.87 11/25/2024    EGFRIFNONA 71 08/03/2021    BCR 18.4 11/25/2024    K 3.9 11/25/2024    CO2 27.0 11/25/2024    CALCIUM 9.2 11/25/2024    ALBUMIN 4.1 11/25/2024    AST 17 11/25/2024    ALT 18 11/25/2024    CHOL 139 11/25/2024    TRIG 154 (H) 11/25/2024    LDL 77 11/25/2024    HDL 35 (L) 11/25/2024     Lab Results   Component Value Date    TSH 1.850 08/02/2021         Medication Review: Reviewed.       Current Outpatient Medications:     amLODIPine (NORVASC) 5 MG tablet, Take 1 tablet by mouth Daily., Disp: 10 tablet, Rfl: 3    aspirin 81 MG tablet, Take 1 tablet by mouth Daily., Disp: , Rfl:     atorvastatin (LIPITOR) 40 MG tablet, Take 1 tablet by mouth Daily., Disp: 90 tablet, Rfl: 4    Biotin 10 MG tablet, Take 10,000 mcg by mouth Daily., Disp: , Rfl:     Cetirizine HCl 10 MG tablet dispersible, 2 (two) times a day., Disp: , Rfl:     Cholecalciferol 50 MCG (2000 UT) capsule, Take 1 capsule by mouth Daily., Disp: , Rfl:     Chromium-Cinnamon  MCG-MG capsule, Take 1 capsule by mouth 2 (two) times a day., Disp: , Rfl:     Cinnamon 500 MG capsule, Take  by mouth., Disp: , Rfl:     Cobalamine Combinations (VITAMIN B12-FOLIC ACID) 500-400 MCG tablet, Take 1 tablet by mouth Daily., Disp: , Rfl:     Coenzyme Q10 (COQ-10) 200 MG capsule, Take 1  capsule by mouth Daily., Disp: , Rfl:     Cyanocobalamin (Vitamin B 12) 500 MCG tablet, Take 1 tablet by mouth Daily., Disp: , Rfl:     docusate sodium (COLACE) 50 MG capsule, , Disp: , Rfl:     empagliflozin (Jardiance) 10 MG tablet tablet, Take 1 tablet by mouth Daily., Disp: 90 tablet, Rfl: 3    Entresto 49-51 MG tablet, , Disp: , Rfl:     gabapentin (NEURONTIN) 100 MG capsule, , Disp: , Rfl:     Glucosamine-Chondroit-Vit C-Mn (GLUCOSAMINE CHONDR 1500 COMPLX PO), Take 1 capsule by mouth Daily., Disp: , Rfl:     glucose blood (Accu-Chek Guide) test strip, Use to test blood sugar 3 times daily. Dx: E11.65, Disp: 100 each, Rfl: 12    hydroCHLOROthiazide (HYDRODIURIL) 25 MG tablet, Take 1 tablet by mouth Daily., Disp: , Rfl:     Insulin Pen Needle (ReliOn Pen Needles) 32G X 4 MM misc, USE 1 TO INJECT INSULIN 4 TIMES DAILY, Disp: 400 each, Rfl: 3    meloxicam (MOBIC) 15 MG tablet, Daily., Disp: , Rfl:     metoprolol tartrate (LOPRESSOR) 100 MG tablet, Take 1 tablet by mouth 2 (Two) Times a Day., Disp: , Rfl:     Multiple Vitamin (MULTIVITAMIN) capsule, Take 1 capsule by mouth Daily., Disp: , Rfl:     mupirocin (BACTROBAN) 2 % ointment, APPLY PEA SIZE AMOUNT TO QTIP AND SWAB LOWER PART OF NOSTRIL MAKING SURE TO TOUCH ALL SIDES DO SAME TO OTHER NOSTRIL PINCH NOSE TOGETHER AND MASSAGE FOR 1 MINUTE REPEAT 2 TIMES DAILY FOR 5 DAYS PRIOR TO SUREGERY, Disp: , Rfl:     nitroglycerin (NITROSTAT) 0.4 MG SL tablet, Place 1 tablet under the tongue Every 5 (Five) Minutes As Needed for Chest Pain. Take no more than 3 doses in 15 minutes., Disp: 25 tablet, Rfl: 12    Ozempic, 2 MG/DOSE, 8 MG/3ML solution pen-injector, INJECT 2MG SUBCUTANEOUSLY  INTO THE APPROPRIATE AREA  AS DIRECTED ONCE WEEKLY, Disp: 9 mL, Rfl: 3    pantoprazole (PROTONIX) 40 MG EC tablet, Daily., Disp: , Rfl:     potassium chloride 10 MEQ CR tablet, Take 1 tablet by mouth Daily., Disp: , Rfl:     Probiotic Product capsule, Take 1 tablet by mouth Daily., Disp: ,  Rfl:     saccharomyces boulardii (FLORASTOR) 250 MG capsule, Take 1 capsule by mouth Daily., Disp: , Rfl:     Turmeric 500 MG tablet, Take 1 tablet by mouth Daily., Disp: , Rfl:     vitamin C (ASCORBIC ACID) 500 MG tablet, Take 2 tablets by mouth Every 12 (Twelve) Hours., Disp: , Rfl:     nitroglycerin (NITROSTAT) 0.4 MG SL tablet, Place 1 tablet under the tongue., Disp: , Rfl:       Assessment & Plan   Diabetes mellitus type 2 with Hyperglycemia: Well-controlled with normal A1c at 5.4%.  Encouraged to continue to work on diet and activity.  We will continue Ozempic 2 mg once a week with Jardiance 10 mg once a day.    Hypertension: Fair control, continue current medication.  He is now on Entresto and has stopped BenzePrO.  Advised to stop BenzePrO completely and do not take it while on Entresto.  He verbalized understanding.    Hyperlipidemia: Well-controlled, continue current medications.    Diabetic nephropathy: Is currently on Entresto.  Blood pressure and diabetes doing well.    Assessment and plan from May 30, 2024 reviewed and updated.              Bryce Anderson MD FACE.

## 2024-12-12 RX ORDER — AMLODIPINE BESYLATE 5 MG/1
5 TABLET ORAL DAILY
Qty: 90 TABLET | Refills: 2 | Status: SHIPPED | OUTPATIENT
Start: 2024-12-12

## 2025-06-03 ENCOUNTER — OFFICE VISIT (OUTPATIENT)
Dept: SLEEP MEDICINE | Facility: CLINIC | Age: 75
End: 2025-06-03
Payer: MEDICARE

## 2025-06-03 VITALS
BODY MASS INDEX: 27.77 KG/M2 | HEART RATE: 63 BPM | SYSTOLIC BLOOD PRESSURE: 128 MMHG | HEIGHT: 72 IN | WEIGHT: 205 LBS | DIASTOLIC BLOOD PRESSURE: 55 MMHG | OXYGEN SATURATION: 96 %

## 2025-06-03 DIAGNOSIS — G47.33 OSA ON CPAP: Primary | ICD-10-CM

## 2025-06-03 PROCEDURE — G0463 HOSPITAL OUTPT CLINIC VISIT: HCPCS

## 2025-06-03 NOTE — PROGRESS NOTES
"  [facility]  Sleep medicine  [facility], [address]  [place] [address]  Phone   Fax       SLEEP CLINIC FOLLOW UP PROGRESS NOTE.    Tom Steele  [id number]   1950  74 y.o.  male      PCP: Nellie Adrian MD      Date of visit: 6/3/2025    Chief Complaint   Patient presents with    Follow-up    Sleep Apnea       HPI:  This is a 74 y.o. years old patient is here for the management of obstructive sleep apnea.  Sleep apnea is severe in severity with a AHI of 82/hr. Patient is using positive airway pressure therapy with auto CPAP and the symptoms of sleep apnea have improved significantly on the therapy. Normally patient goes to bed at 11 PM and wakes up at 8 AM .  The patient wakes up 2 time(s) during the night and has no problem going back to sleep.  Feels refreshed after waking up.  He is retired he is also with his wife who also uses CPAP  Unfortunately the data is available only until March.  He reports that he was on a trip in March and most likely the smartcard may have been displaced    Medications and allergies are reviewed by me and documented in the encounter.     SOCIAL (habits pertaining to sleep medicine)  History tobacco use: No  History of alcohol use: 0 per week  Caffeine use: 1    REVIEW OF SYSTEMS:   Pertaining positive symptoms are:  [place] Sleepiness Scale :Total score: 11   Shortness of breath with exertion      PHYSICAL EXAMINATION:  CONSTITUTIONAL:  Vitals:    06/03/25 1100   BP: 128/55   BP Location: Left arm   Patient Position: Sitting   Pulse: 63   SpO2: 96%   Weight: 93 kg (205 lb)   Height: 182.9 cm (72\")    Body mass index is 27.8 kg/m².   NOSE: nasal passages are clear, No deformities noted   RESP SYSTEM: Not in any respiratory distress, no chest deformities noted,   CARDIOVASULAR: No edema noted  NEURO: Oriented x 3, gait normal,  Mood and affect appeared appropriate      Data reviewed:  The Smart card downloaded on 6/3/2025 " has been reviewed independently by me for compliance and discussed the data with the patient.   He is going to check on the smart card and make sure it is properly set and will bring back for the download  Device: DreamStation 2  DME: [employer]      ASSESSMENT AND PLAN:  Obstructive sleep apnea ( G 47.33).  The symptoms of sleep apnea have improved with the device and the treatment.  Patient's compliance with the device is excellent for treatment of sleep apnea.  I have independently reviewed the smart card down load and discussed with the patient the download data and encouarged the patient to continue to use the device.The residual AHI is acceptable. The device is benefiting the patient and the device is medically necessary.  Without proper control of sleep apnea and good compliance there is a increased risk for hypertension, diabetes mellitus and nonrestorative sleep with hypersomnia which can increase risk for motor vehicle accidents.  Untreated sleep apnea is also a risk factor for development of atrial fibrillation, pulmonary hypertension, insulin resistance and stroke. The patient is also instructed to get the supplies from the [employer] company and and change them on a regular basis.  A prescription for supplies has been sent to the [employer] company.  I have also discussed the good sleep hygiene habits and adequate amount of sleep needed for good health.  Return in about 1 year (around 6/3/2026) for with smart card down load. . Patient's questions were answered.    6/3/2025  Odin Maldonado MD  Sleep Medicine.  Medical Director,   [facility].

## 2025-07-01 ENCOUNTER — OFFICE VISIT (OUTPATIENT)
Dept: ENDOCRINOLOGY | Facility: CLINIC | Age: 75
End: 2025-07-01
Payer: MEDICARE

## 2025-07-01 VITALS
BODY MASS INDEX: 28.2 KG/M2 | OXYGEN SATURATION: 99 % | HEIGHT: 72 IN | DIASTOLIC BLOOD PRESSURE: 74 MMHG | WEIGHT: 208.2 LBS | SYSTOLIC BLOOD PRESSURE: 118 MMHG | HEART RATE: 65 BPM

## 2025-07-01 DIAGNOSIS — E11.65 TYPE 2 DIABETES MELLITUS WITH HYPERGLYCEMIA, WITH LONG-TERM CURRENT USE OF INSULIN: Primary | ICD-10-CM

## 2025-07-01 DIAGNOSIS — Z79.4 TYPE 2 DIABETES MELLITUS WITH HYPERGLYCEMIA, WITH LONG-TERM CURRENT USE OF INSULIN: Primary | ICD-10-CM

## 2025-07-01 DIAGNOSIS — I10 ESSENTIAL HYPERTENSION: ICD-10-CM

## 2025-07-01 DIAGNOSIS — E78.2 MIXED HYPERLIPIDEMIA: ICD-10-CM

## 2025-07-01 DIAGNOSIS — E11.21 DIABETIC NEPHROPATHY ASSOCIATED WITH TYPE 2 DIABETES MELLITUS: ICD-10-CM

## 2025-07-01 RX ORDER — SEMAGLUTIDE 2.68 MG/ML
2 INJECTION, SOLUTION SUBCUTANEOUS WEEKLY
Qty: 9 ML | Refills: 3 | Status: SHIPPED | OUTPATIENT
Start: 2025-07-01

## 2025-07-01 RX ORDER — ATORVASTATIN CALCIUM 40 MG/1
40 TABLET, FILM COATED ORAL DAILY
Qty: 90 TABLET | Refills: 4 | Status: SHIPPED | OUTPATIENT
Start: 2025-07-01

## 2025-07-01 RX ORDER — AMLODIPINE BESYLATE 5 MG/1
5 TABLET ORAL DAILY
Qty: 90 TABLET | Refills: 3 | Status: SHIPPED | OUTPATIENT
Start: 2025-07-01

## 2025-07-01 NOTE — PROGRESS NOTES
Endocrine Progress Note Outpatient     Patient Care Team:  Nellie Adrian MD as PCP - General (Family Medicine)    Chief Complaint: Follow-up type 2 diabetes    HPI: 74-year-old male with type 2 diabetes, hypertension, hyperlipidemia CAD is here for follow-up.    For type 2 diabetes: Diagnosed in August 2021, currently on Ozempic 2.0 mg subcu weekly and Jardiance 10 mg once a day.  He is tolerating his medications well.  He forgot to bring his blood sugar monitor but his usual blood sugars are less than 120.  He is tolerating his medications well.    Hypertension: Fair control    Hyperlipidemia: Currently on atorvastatin    CAD: Status post stent placement.    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    NEREIDA (obstructive sleep apnea)     Type 2 diabetes mellitus        Social History     Socioeconomic History    Marital status:      Spouse name: Colette    Number of children: 4    Years of education: 14   Tobacco Use    Smoking status: Never     Passive exposure: Never    Smokeless tobacco: Never   Vaping Use    Vaping status: Never Used   Substance and Sexual Activity    Alcohol use: Not Currently    Drug use: Never    Sexual activity: Not Currently     Partners: Female       Family History   Problem Relation Age of Onset    Heart disease Mother     Sleep apnea Mother     Heart disease Father     Diabetes Sister     Hypertension Sister     Thyroid disease Sister        No Known Allergies    ROS:   Constitutional:  Denies fatigue, tiredness.    Eyes:  Denies change in visual acuity   HENT:  Denies nasal congestion or sore throat   Respiratory: denies cough, shortness of breath.   Cardiovascular:  denies chest pain, edema   GI:  Denies abdominal pain, nausea, vomiting.   Musculoskeletal:  Denies  back pain or joint pain   Integument:  Denies dry skin and rash   Neurologic:  Denies headache, focal weakness or sensory changes   Endocrine:  Denies polyuria or polydipsia   Psychiatric:  Denies depression or anxiety      Vitals:    07/01/25 0836   BP: 118/74   Pulse: 65   SpO2: 99%     Body mass index is 28.24 kg/m².     Physical Exam:  GEN: NAD, conversant  EYES: EOMI,   NECK: no thyromegaly,   CV: RRR,   LUNG: CTA  PSYCH: AOX3, appropriate mood, affect normal      Results Review:     I reviewed the patient's new clinical results.    Lab Results   Component Value Date    HGBA1C 5.5 06/25/2025    HGBA1C 5.40 11/25/2024    HGBA1C 5.40 05/13/2024      Lab Results   Component Value Date    GLUCOSE 98 06/25/2025    BUN 35 (H) 06/25/2025    CREATININE 1.10 06/25/2025    EGFRIFNONA 71 08/03/2021    BCR 32 (H) 06/25/2025    K 4.4 06/25/2025    CO2 23 06/25/2025    CALCIUM 9.3 06/25/2025    ALBUMIN 4.4 06/25/2025    AST 20 06/25/2025    ALT 20 06/25/2025    CHOL 139 11/25/2024    TRIG 254 (H) 06/25/2025    LDL 63 06/25/2025    HDL 27 (L) 06/25/2025     Lab Results   Component Value Date    TSH 1.850 08/02/2021         Medication Review: Reviewed.       Current Outpatient Medications:     amLODIPine (NORVASC) 5 MG tablet, Take 1 tablet by mouth Daily., Disp: 90 tablet, Rfl: 2    aspirin 81 MG tablet, Take 1 tablet by mouth Daily., Disp: , Rfl:     atorvastatin (LIPITOR) 40 MG tablet, Take 1 tablet by mouth Daily., Disp: 90 tablet, Rfl: 4    Biotin 10 MG tablet, Take 10,000 mcg by mouth Daily., Disp: , Rfl:     Cetirizine HCl 10 MG tablet dispersible, 2 (two) times a day., Disp: , Rfl:     Cholecalciferol 50 MCG (2000 UT) capsule, Take 1 capsule by mouth Daily., Disp: , Rfl:     Chromium-Cinnamon  MCG-MG capsule, Take 1 capsule by mouth 2 (two) times a day., Disp: , Rfl:     Cinnamon 500 MG capsule, Take  by mouth., Disp: , Rfl:     Cobalamine Combinations (VITAMIN B12-FOLIC ACID) 500-400 MCG tablet, Take 1 tablet by  mouth Daily., Disp: , Rfl:     Coenzyme Q10 (COQ-10) 200 MG capsule, Take 1 capsule by mouth Daily., Disp: , Rfl:     Cyanocobalamin (Vitamin B 12) 500 MCG tablet, Take 1 tablet by mouth Daily., Disp: , Rfl:     docusate sodium (COLACE) 50 MG capsule, , Disp: , Rfl:     empagliflozin (Jardiance) 10 MG tablet tablet, Take 1 tablet by mouth Daily., Disp: 90 tablet, Rfl: 3    Entresto 49-51 MG tablet, , Disp: , Rfl:     gabapentin (NEURONTIN) 100 MG capsule, , Disp: , Rfl:     Glucosamine-Chondroit-Vit C-Mn (GLUCOSAMINE CHONDR 1500 COMPLX PO), Take 1 capsule by mouth Daily., Disp: , Rfl:     glucose blood (Accu-Chek Guide) test strip, Use to test blood sugar 3 times daily. Dx: E11.65, Disp: 100 each, Rfl: 12    hydroCHLOROthiazide (HYDRODIURIL) 25 MG tablet, Take 1 tablet by mouth Daily., Disp: , Rfl:     Insulin Pen Needle (ReliOn Pen Needles) 32G X 4 MM misc, USE 1 TO INJECT INSULIN 4 TIMES DAILY, Disp: 400 each, Rfl: 3    meloxicam (MOBIC) 15 MG tablet, Daily., Disp: , Rfl:     metoprolol tartrate (LOPRESSOR) 100 MG tablet, Take 1 tablet by mouth 2 (Two) Times a Day., Disp: , Rfl:     Multiple Vitamin (MULTIVITAMIN) capsule, Take 1 capsule by mouth Daily., Disp: , Rfl:     mupirocin (BACTROBAN) 2 % ointment, APPLY PEA SIZE AMOUNT TO QTIP AND SWAB LOWER PART OF NOSTRIL MAKING SURE TO TOUCH ALL SIDES DO SAME TO OTHER NOSTRIL PINCH NOSE TOGETHER AND MASSAGE FOR 1 MINUTE REPEAT 2 TIMES DAILY FOR 5 DAYS PRIOR TO SUREGERY, Disp: , Rfl:     nitroglycerin (NITROSTAT) 0.4 MG SL tablet, Place 1 tablet under the tongue Every 5 (Five) Minutes As Needed for Chest Pain. Take no more than 3 doses in 15 minutes., Disp: 25 tablet, Rfl: 12    nitroglycerin (NITROSTAT) 0.4 MG SL tablet, Place 1 tablet under the tongue., Disp: , Rfl:     pantoprazole (PROTONIX) 40 MG EC tablet, Daily., Disp: , Rfl:     potassium chloride 10 MEQ CR tablet, Take 1 tablet by mouth Daily., Disp: , Rfl:     Probiotic Product capsule, Take 1 tablet by mouth  Daily., Disp: , Rfl:     saccharomyces boulardii (FLORASTOR) 250 MG capsule, Take 1 capsule by mouth Daily., Disp: , Rfl:     Semaglutide, 2 MG/DOSE, (Ozempic, 2 MG/DOSE,) 8 MG/3ML solution pen-injector, Inject 2 mg under the skin into the appropriate area as directed 1 (One) Time Per Week., Disp: 9 mL, Rfl: 3    Turmeric 500 MG tablet, Take 1 tablet by mouth Daily., Disp: , Rfl:     vitamin C (ASCORBIC ACID) 500 MG tablet, Take 2 tablets by mouth Every 12 (Twelve) Hours., Disp: , Rfl:       Assessment & Plan   Diabetes mellitus type 2 with Hyperglycemia: Well-controlled with A1c at 5.5% and he is tolerating his medications well.  Will continue current regimen including Ozempic 2 mg once a week with Jardiance 10 mg once a day.  Recommend to continue to work on diet and activity and always keep glucose source in case of low blood sugars.      Hypertension: Fair control, continue current medication.      Hyperlipidemia: Well-controlled, continue current medications.    Diabetic nephropathy: Is currently on Entresto.  Blood pressure and diabetes doing well.    Assessment and plan from December 9, 2024 reviewed and updated.              Bryce Anderson MD FACE.

## 2025-07-01 NOTE — PATIENT INSTRUCTIONS
Continue current medication  Continue to work on diet and activity  Always keep glucose source in case of low blood sugar  Labs before follow-up.

## 2025-08-21 LAB — HBA1C MFR BLD: 5.6 %
